# Patient Record
Sex: MALE | Race: WHITE | Employment: FULL TIME | URBAN - NONMETROPOLITAN AREA
[De-identification: names, ages, dates, MRNs, and addresses within clinical notes are randomized per-mention and may not be internally consistent; named-entity substitution may affect disease eponyms.]

---

## 2017-07-28 ENCOUNTER — OFFICE VISIT (OUTPATIENT)
Dept: FAMILY MEDICINE CLINIC | Age: 47
End: 2017-07-28

## 2017-07-28 VITALS
SYSTOLIC BLOOD PRESSURE: 116 MMHG | WEIGHT: 225 LBS | HEIGHT: 72 IN | DIASTOLIC BLOOD PRESSURE: 80 MMHG | HEART RATE: 92 BPM | OXYGEN SATURATION: 97 % | TEMPERATURE: 98.1 F | BODY MASS INDEX: 30.48 KG/M2

## 2017-07-28 DIAGNOSIS — K70.10 CHRONIC ALCOHOLIC HEPATITIS: ICD-10-CM

## 2017-07-28 DIAGNOSIS — R14.0 ABDOMINAL BLOATING: Primary | ICD-10-CM

## 2017-07-28 PROCEDURE — 99203 OFFICE O/P NEW LOW 30 MIN: CPT | Performed by: NURSE PRACTITIONER

## 2017-07-28 RX ORDER — FOLIC ACID 1 MG/1
TABLET ORAL
Refills: 0 | COMMUNITY
Start: 2017-06-10 | End: 2017-07-28

## 2017-07-28 RX ORDER — GABAPENTIN 300 MG/1
CAPSULE ORAL
Refills: 0 | COMMUNITY
Start: 2017-06-10 | End: 2017-07-28

## 2017-07-28 RX ORDER — PHYTONADIONE (VIT K1) 5 MG
TABLET ORAL
Refills: 0 | COMMUNITY
Start: 2017-06-12 | End: 2017-07-28

## 2017-07-28 RX ORDER — DOCUSATE SODIUM 100 MG/1
CAPSULE, LIQUID FILLED ORAL
Refills: 0 | COMMUNITY
Start: 2017-06-10 | End: 2017-07-28

## 2017-07-28 RX ORDER — ASPIRIN 325 MG/1
TABLET, FILM COATED ORAL
Refills: 0 | COMMUNITY
Start: 2017-06-10 | End: 2017-07-28

## 2017-07-28 RX ORDER — PANTOPRAZOLE SODIUM 40 MG/1
TABLET, DELAYED RELEASE ORAL
Refills: 0 | COMMUNITY
Start: 2017-07-11 | End: 2017-07-28

## 2017-07-28 ASSESSMENT — PATIENT HEALTH QUESTIONNAIRE - PHQ9
1. LITTLE INTEREST OR PLEASURE IN DOING THINGS: 0
2. FEELING DOWN, DEPRESSED OR HOPELESS: 0
SUM OF ALL RESPONSES TO PHQ9 QUESTIONS 1 & 2: 0
SUM OF ALL RESPONSES TO PHQ QUESTIONS 1-9: 0

## 2017-07-28 ASSESSMENT — ENCOUNTER SYMPTOMS
SHORTNESS OF BREATH: 0
COUGH: 0

## 2017-08-14 RX ORDER — PANTOPRAZOLE SODIUM 40 MG/1
TABLET, DELAYED RELEASE ORAL
COMMUNITY
Start: 2017-08-07 | End: 2017-08-15 | Stop reason: ALTCHOICE

## 2017-08-15 ENCOUNTER — OFFICE VISIT (OUTPATIENT)
Dept: CARDIOLOGY | Age: 47
End: 2017-08-15

## 2017-08-15 VITALS
BODY MASS INDEX: 29.12 KG/M2 | HEART RATE: 80 BPM | RESPIRATION RATE: 12 BRPM | SYSTOLIC BLOOD PRESSURE: 114 MMHG | HEIGHT: 72 IN | DIASTOLIC BLOOD PRESSURE: 80 MMHG | WEIGHT: 215 LBS

## 2017-08-15 DIAGNOSIS — Z87.898 HISTORY OF ALCOHOL USE: ICD-10-CM

## 2017-08-15 DIAGNOSIS — R07.89 CHEST PRESSURE: ICD-10-CM

## 2017-08-15 DIAGNOSIS — I10 ESSENTIAL HYPERTENSION: Primary | ICD-10-CM

## 2017-08-15 PROCEDURE — 99213 OFFICE O/P EST LOW 20 MIN: CPT | Performed by: INTERNAL MEDICINE

## 2017-08-15 ASSESSMENT — ENCOUNTER SYMPTOMS
APNEA: 0
SHORTNESS OF BREATH: 0
CHEST TIGHTNESS: 0
COLOR CHANGE: 0
COUGH: 0

## 2017-12-29 ENCOUNTER — OFFICE VISIT (OUTPATIENT)
Dept: FAMILY MEDICINE CLINIC | Age: 47
End: 2017-12-29

## 2017-12-29 VITALS
WEIGHT: 221 LBS | DIASTOLIC BLOOD PRESSURE: 60 MMHG | TEMPERATURE: 97.8 F | HEIGHT: 72 IN | OXYGEN SATURATION: 97 % | SYSTOLIC BLOOD PRESSURE: 116 MMHG | BODY MASS INDEX: 29.93 KG/M2 | HEART RATE: 77 BPM

## 2017-12-29 DIAGNOSIS — F41.9 ANXIETY: ICD-10-CM

## 2017-12-29 DIAGNOSIS — K70.10 CHRONIC ALCOHOLIC HEPATITIS: ICD-10-CM

## 2017-12-29 DIAGNOSIS — G47.00 INSOMNIA, UNSPECIFIED TYPE: ICD-10-CM

## 2017-12-29 DIAGNOSIS — R20.0 NUMBNESS IN RIGHT LEG: Primary | ICD-10-CM

## 2017-12-29 DIAGNOSIS — R68.82 DECREASED LIBIDO: ICD-10-CM

## 2017-12-29 PROCEDURE — G8417 CALC BMI ABV UP PARAM F/U: HCPCS | Performed by: NURSE PRACTITIONER

## 2017-12-29 PROCEDURE — 4004F PT TOBACCO SCREEN RCVD TLK: CPT | Performed by: NURSE PRACTITIONER

## 2017-12-29 PROCEDURE — G8427 DOCREV CUR MEDS BY ELIG CLIN: HCPCS | Performed by: NURSE PRACTITIONER

## 2017-12-29 PROCEDURE — G8484 FLU IMMUNIZE NO ADMIN: HCPCS | Performed by: NURSE PRACTITIONER

## 2017-12-29 PROCEDURE — 99214 OFFICE O/P EST MOD 30 MIN: CPT | Performed by: NURSE PRACTITIONER

## 2017-12-29 RX ORDER — HYDROXYZINE PAMOATE 25 MG/1
25 CAPSULE ORAL 3 TIMES DAILY PRN
Qty: 15 CAPSULE | Refills: 0 | Status: SHIPPED | OUTPATIENT
Start: 2017-12-29 | End: 2018-11-01 | Stop reason: SDUPTHER

## 2017-12-29 ASSESSMENT — ENCOUNTER SYMPTOMS
COUGH: 0
SHORTNESS OF BREATH: 0

## 2017-12-29 NOTE — PROGRESS NOTES
Subjective  Chief Complaint   Patient presents with    Annual Exam       HPI     Pt here for general check up. Does admit to having some localized numbness in his right lower ext that he first noticed while rodo shopping. It would get better while he sat in the car. Since then it has become numb all the time. Admits he has been working out at [employer] more and is working from home now as well so wonders if that contributes. Also mentions he was taking an OTC testosterone supplement from [facility] for decreased libido. Says he feels like it did not make much of a difference. Thinks this could be related to some anxiety. Does mention that his stress level has been very high and feels that he is having anxiety on occasion. He used to handle this by having a drink; however he has been sober now since June. Says he does not need something daily, but just once in a while if it is really bad. Also struggling with insomnia, but has tried [place] in the past and it had the reverse effect on him. Will be seeing his gastroenterologist again in January. Did recently see him in October and said all of his labs looked good, but he will need a repeat US in January to assess for any physical damage to the liver. Says he did have a couple of drinks one day since I saw him last and he felt terrible so has not had any since.      Patient Active Problem List    Diagnosis Date Noted    History of alcohol use     Chronic alcoholic hepatitis     Hypertension 03/08/2016    Chest pressure 03/08/2016     Past Medical History:   Diagnosis Date    Chest pressure 3/8/2016    Cholelithiasis     Chronic alcoholic hepatitis     History of alcohol use     Hypertension 3/8/2016     Past Surgical History:   Procedure Laterality Date    CYST REMOVAL      TONSILLECTOMY       Family History   Problem Relation Age of Onset    Alcohol Abuse Father     Diabetes Brother     Heart Attack Paternal Grandfather      Social History     Social History    Marital status:      Spouse name: N/A    Number of children: N/A    Years of education: N/A     Social History Main Topics    Smoking status: Current Every Day Smoker     Packs/day: 0.50    Smokeless tobacco: Never Used    Alcohol use No      Comment: socially    Drug use: No    Sexual activity: Not Asked     Other Topics Concern    None     Social History Narrative    None     Current Outpatient Prescriptions on File Prior to Visit   Medication Sig Dispense Refill    Multiple Vitamin (MULTI VITAMIN PO) Take by mouth      PROAIR  (90 Base) MCG/ACT inhaler INHALE 2 PUFFS BY MOUTH 4 TIMES DAILY AS NEEDED FOR SHORTNESS OF BREATH.  0    omeprazole (PRILOSEC) 40 MG capsule Take 1 capsule by mouth daily 90 capsule 3     No current facility-administered medications on file prior to visit. No Known Allergies    Review of Systems   Constitutional: Negative for chills, diaphoresis, fatigue and fever. Respiratory: Negative for cough and shortness of breath. Cardiovascular: Negative for chest pain, palpitations and leg swelling. Endocrine:        Decreased libido     Genitourinary: Negative for dysuria. Neurological: Positive for numbness (right lower ext). Psychiatric/Behavioral: Positive for sleep disturbance. Negative for dysphoric mood. The patient is nervous/anxious. Objective  Vitals:    12/29/17 1349   BP: 116/60   Pulse: 77   Temp: 97.8 °F (36.6 °C)   TempSrc: Tympanic   SpO2: 97%   Weight: 221 lb (100.2 kg)   Height: 6' (1.829 m)     Physical Exam   Constitutional: He is oriented to person, place, and time. He appears well-developed and well-nourished. No distress. HENT:   Head: Normocephalic and atraumatic. Right Ear: Tympanic membrane, external ear and ear canal normal.   Left Ear: Tympanic membrane, external ear and ear canal normal.   Nose: Nose normal.   Mouth/Throat: Oropharynx is clear and moist. No oropharyngeal exudate.    Eyes: Conjunctivae are normal. Pupils are equal, round, and reactive to light. Neck: Normal range of motion. Neck supple. Cardiovascular: Normal rate, regular rhythm and normal heart sounds. Pulmonary/Chest: Breath sounds normal. No respiratory distress. Musculoskeletal:        Legs:  Lymphadenopathy:     He has no cervical adenopathy. Neurological: He is alert and oriented to person, place, and time. Skin: Skin is warm and dry. No rash noted. He is not diaphoretic. No erythema. No pallor. Psychiatric: He has a normal mood and affect. His behavior is normal. Judgment and thought content normal.     Assessment & Plan    1. Numbness in right leg  EMG   2. Decreased libido  Testosterone Male   3. Anxiety  hydrOXYzine (VISTARIL) 25 MG capsule   4. Insomnia, unspecified type     5. Chronic alcoholic hepatitis       EMG as ordered. Check testosterone level at 8 AM.  Vistaril PRN for anxiety. Melatonin for insomnia. Avoid use of any controlled substances d/t history of alcoholism. Discussed need to continue to abstain from all alcoholic beverages. Continue to follow with cardiology and gastroenterology. F/u in 6 months or sooner PRN. Side effects, adverse effects of the medication prescribed today, as well as treatment plan/ rationale and result expectations have been discussed with the patient who expresses understanding and desires to proceed. Close follow up to evaluate treatment results and for coordination of care. I have reviewed the patient's medical history in detail and updated the computerized patient record. As always, patient is advised that if symptoms worsen in any way they will proceed to the nearest emergency room. Orders Placed This Encounter   Procedures    Testosterone Male     Standing Status:   Future     Standing Expiration Date:   12/29/2018    EMG     Standing Status:   Future     Standing Expiration Date:   2/27/2018     Order Specific Question:   Which body part? Answer:   right lower ext       Orders Placed This Encounter   Medications    hydrOXYzine (VISTARIL) 25 MG capsule     Sig: Take 1 capsule by mouth 3 times daily as needed for Itching     Dispense:  15 capsule     Refill:  0       Return in about 6 months (around 6/29/2018).     Vicenta Rene, CNP

## 2018-01-11 DIAGNOSIS — R68.82 DECREASED LIBIDO: ICD-10-CM

## 2018-01-13 LAB — TESTOSTERONE TOTAL-MALE: 870 NG/DL (ref 300–890)

## 2018-01-15 ENCOUNTER — HOSPITAL ENCOUNTER (EMERGENCY)
Age: 48
Discharge: HOME | End: 2018-01-16
Payer: COMMERCIAL

## 2018-01-15 ENCOUNTER — APPOINTMENT (OUTPATIENT)
Dept: CT IMAGING | Age: 48
End: 2018-01-15
Payer: COMMERCIAL

## 2018-01-15 ENCOUNTER — APPOINTMENT (OUTPATIENT)
Dept: GENERAL RADIOLOGY | Age: 48
End: 2018-01-15
Payer: COMMERCIAL

## 2018-01-15 DIAGNOSIS — K80.20 CALCULUS OF GALLBLADDER WITHOUT CHOLECYSTITIS WITHOUT OBSTRUCTION: Primary | ICD-10-CM

## 2018-01-15 LAB
ALBUMIN SERPL-MCNC: 3.8 G/DL (ref 3.9–4.9)
ALP BLD-CCNC: 80 U/L (ref 35–104)
ALT SERPL-CCNC: 18 U/L (ref 0–41)
ANION GAP SERPL CALCULATED.3IONS-SCNC: 11 MEQ/L (ref 7–13)
AST SERPL-CCNC: 28 U/L (ref 0–40)
BASOPHILS ABSOLUTE: 0.1 K/UL (ref 0–0.2)
BASOPHILS RELATIVE PERCENT: 0.5 %
BILIRUB SERPL-MCNC: 0.4 MG/DL (ref 0–1.2)
BUN BLDV-MCNC: 12 MG/DL (ref 6–20)
CALCIUM SERPL-MCNC: 9.5 MG/DL (ref 8.6–10.2)
CHLORIDE BLD-SCNC: 99 MEQ/L (ref 98–107)
CO2: 28 MEQ/L (ref 22–29)
CREAT SERPL-MCNC: 0.62 MG/DL (ref 0.7–1.2)
EKG ATRIAL RATE: 75 BPM
EKG P AXIS: 47 DEGREES
EKG P-R INTERVAL: 178 MS
EKG Q-T INTERVAL: 388 MS
EKG QRS DURATION: 84 MS
EKG QTC CALCULATION (BAZETT): 433 MS
EKG R AXIS: 50 DEGREES
EKG T AXIS: 40 DEGREES
EKG VENTRICULAR RATE: 75 BPM
EOSINOPHILS ABSOLUTE: 0.7 K/UL (ref 0–0.7)
EOSINOPHILS RELATIVE PERCENT: 6.1 %
GFR AFRICAN AMERICAN: >60
GFR NON-AFRICAN AMERICAN: >60
GLOBULIN: 3.6 G/DL (ref 2.3–3.5)
GLUCOSE BLD-MCNC: 89 MG/DL (ref 74–109)
HCT VFR BLD CALC: 41.2 % (ref 42–52)
HEMOGLOBIN: 13.9 G/DL (ref 14–18)
LIPASE: 103 U/L (ref 13–60)
LYMPHOCYTES ABSOLUTE: 3.9 K/UL (ref 1–4.8)
LYMPHOCYTES RELATIVE PERCENT: 34.5 %
MCH RBC QN AUTO: 30.5 PG (ref 27–31.3)
MCHC RBC AUTO-ENTMCNC: 33.8 % (ref 33–37)
MCV RBC AUTO: 90.3 FL (ref 80–100)
MONOCYTES ABSOLUTE: 0.8 K/UL (ref 0.2–0.8)
MONOCYTES RELATIVE PERCENT: 7.1 %
NEUTROPHILS ABSOLUTE: 5.9 K/UL (ref 1.4–6.5)
NEUTROPHILS RELATIVE PERCENT: 51.8 %
PDW BLD-RTO: 13 % (ref 11.5–14.5)
PLATELET # BLD: 205 K/UL (ref 130–400)
POTASSIUM SERPL-SCNC: 3.5 MEQ/L (ref 3.5–5.1)
RBC # BLD: 4.57 M/UL (ref 4.7–6.1)
SODIUM BLD-SCNC: 138 MEQ/L (ref 132–144)
TOTAL PROTEIN: 7.4 G/DL (ref 6.4–8.1)
TROPONIN: <0.01 NG/ML (ref 0–0.01)
WBC # BLD: 11.4 K/UL (ref 4.8–10.8)

## 2018-01-15 PROCEDURE — 36415 COLL VENOUS BLD VENIPUNCTURE: CPT

## 2018-01-15 PROCEDURE — 71046 X-RAY EXAM CHEST 2 VIEWS: CPT

## 2018-01-15 PROCEDURE — 93005 ELECTROCARDIOGRAM TRACING: CPT

## 2018-01-15 PROCEDURE — 2500000003 HC RX 250 WO HCPCS: Performed by: PERSONAL EMERGENCY RESPONSE ATTENDANT

## 2018-01-15 PROCEDURE — 96375 TX/PRO/DX INJ NEW DRUG ADDON: CPT

## 2018-01-15 PROCEDURE — 96374 THER/PROPH/DIAG INJ IV PUSH: CPT

## 2018-01-15 PROCEDURE — 85025 COMPLETE CBC W/AUTO DIFF WBC: CPT

## 2018-01-15 PROCEDURE — 99285 EMERGENCY DEPT VISIT HI MDM: CPT

## 2018-01-15 PROCEDURE — 74176 CT ABD & PELVIS W/O CONTRAST: CPT

## 2018-01-15 PROCEDURE — 6360000002 HC RX W HCPCS: Performed by: PERSONAL EMERGENCY RESPONSE ATTENDANT

## 2018-01-15 PROCEDURE — 84484 ASSAY OF TROPONIN QUANT: CPT

## 2018-01-15 PROCEDURE — 83690 ASSAY OF LIPASE: CPT

## 2018-01-15 PROCEDURE — S0028 INJECTION, FAMOTIDINE, 20 MG: HCPCS | Performed by: PERSONAL EMERGENCY RESPONSE ATTENDANT

## 2018-01-15 PROCEDURE — 80053 COMPREHEN METABOLIC PANEL: CPT

## 2018-01-15 RX ORDER — MORPHINE SULFATE 4 MG/ML
4 INJECTION, SOLUTION INTRAMUSCULAR; INTRAVENOUS ONCE
Status: COMPLETED | OUTPATIENT
Start: 2018-01-15 | End: 2018-01-15

## 2018-01-15 RX ORDER — ONDANSETRON 2 MG/ML
4 INJECTION INTRAMUSCULAR; INTRAVENOUS ONCE
Status: COMPLETED | OUTPATIENT
Start: 2018-01-15 | End: 2018-01-15

## 2018-01-15 RX ORDER — TRAMADOL HYDROCHLORIDE 50 MG/1
50 TABLET ORAL EVERY 6 HOURS PRN
Qty: 10 TABLET | Refills: 0 | Status: SHIPPED | OUTPATIENT
Start: 2018-01-15 | End: 2018-01-25

## 2018-01-15 RX ORDER — ONDANSETRON 4 MG/1
4 TABLET, ORALLY DISINTEGRATING ORAL EVERY 8 HOURS PRN
Qty: 20 TABLET | Refills: 0 | Status: SHIPPED | OUTPATIENT
Start: 2018-01-15 | End: 2018-02-15

## 2018-01-15 RX ADMIN — Medication 4 MG: at 22:21

## 2018-01-15 RX ADMIN — Medication 1 MG: at 23:08

## 2018-01-15 RX ADMIN — FAMOTIDINE 20 MG: 10 INJECTION, SOLUTION INTRAVENOUS at 22:21

## 2018-01-15 RX ADMIN — ONDANSETRON 4 MG: 2 INJECTION INTRAMUSCULAR; INTRAVENOUS at 22:20

## 2018-01-15 ASSESSMENT — ENCOUNTER SYMPTOMS
ABDOMINAL PAIN: 1
BACK PAIN: 1
RHINORRHEA: 0
SHORTNESS OF BREATH: 0
BLOOD IN STOOL: 0
NAUSEA: 1
VOMITING: 0
DIARRHEA: 0
COLOR CHANGE: 0
COUGH: 0

## 2018-01-15 ASSESSMENT — PAIN DESCRIPTION - LOCATION: LOCATION: ABDOMEN;BACK;CHEST

## 2018-01-15 ASSESSMENT — PAIN DESCRIPTION - PAIN TYPE
TYPE: ACUTE PAIN
TYPE: ACUTE PAIN

## 2018-01-15 ASSESSMENT — PAIN SCALES - GENERAL
PAINLEVEL_OUTOF10: 8
PAINLEVEL_OUTOF10: 9
PAINLEVEL_OUTOF10: 5
PAINLEVEL_OUTOF10: 9

## 2018-01-15 ASSESSMENT — PAIN DESCRIPTION - ORIENTATION: ORIENTATION: MID

## 2018-01-15 ASSESSMENT — PAIN DESCRIPTION - DESCRIPTORS: DESCRIPTORS: STABBING

## 2018-01-16 VITALS
SYSTOLIC BLOOD PRESSURE: 109 MMHG | HEART RATE: 82 BPM | DIASTOLIC BLOOD PRESSURE: 66 MMHG | OXYGEN SATURATION: 97 % | BODY MASS INDEX: 27.09 KG/M2 | HEIGHT: 72 IN | WEIGHT: 200 LBS | RESPIRATION RATE: 16 BRPM | TEMPERATURE: 98.1 F

## 2018-01-16 ASSESSMENT — PAIN DESCRIPTION - ORIENTATION: ORIENTATION: LEFT

## 2018-01-16 ASSESSMENT — PAIN DESCRIPTION - DESCRIPTORS: DESCRIPTORS: ACHING

## 2018-01-16 ASSESSMENT — PAIN DESCRIPTION - FREQUENCY: FREQUENCY: INTERMITTENT

## 2018-01-16 ASSESSMENT — PAIN SCALES - GENERAL: PAINLEVEL_OUTOF10: 1

## 2018-01-16 ASSESSMENT — PAIN DESCRIPTION - PROGRESSION: CLINICAL_PROGRESSION: RAPIDLY IMPROVING

## 2018-01-16 ASSESSMENT — PAIN DESCRIPTION - PAIN TYPE: TYPE: ACUTE PAIN

## 2018-01-16 ASSESSMENT — PAIN DESCRIPTION - LOCATION: LOCATION: ABDOMEN

## 2018-01-16 NOTE — ED NOTES
Pt has family at bedside and they state they are driving pt home. Pt is a+ox4 and stable. Pt is ambulatory at this time. Pt is going to be going home with wife and son and they will remain with him for the remained on the evening.       Blanca Ponce RN  01/16/18 4056

## 2018-01-16 NOTE — ED TRIAGE NOTES
Pt c/o sudden onset chest pain, epigastric pain, abdominal pain and upper back pain, he rates the pain 8/10, he took 4 aleve at onset of the pain, also c/o nausea and sob. He was sitting in a chair wqhen it began, he worked out earlier today.

## 2018-01-16 NOTE — ED PROVIDER NOTES
color change and rash. Neurological: Negative for dizziness, syncope, light-headedness, numbness and headaches. PAST MEDICAL HISTORY     Past Medical History:   Diagnosis Date    Chest pressure 3/8/2016    Cholelithiasis     Chronic alcoholic hepatitis     History of alcohol use     Hypertension 3/8/2016         SURGICAL HISTORY       Past Surgical History:   Procedure Laterality Date    CYST REMOVAL      TONSILLECTOMY           CURRENT MEDICATIONS       Previous Medications    MULTIPLE VITAMIN (MULTI VITAMIN PO)    Take by mouth    OMEPRAZOLE (PRILOSEC) 40 MG CAPSULE    Take 1 capsule by mouth daily    PROAIR  (90 BASE) MCG/ACT INHALER    INHALE 2 PUFFS BY MOUTH 4 TIMES DAILY AS NEEDED FOR SHORTNESS OF BREATH. ALLERGIES     Tylenol [acetaminophen]    FAMILY HISTORY       Family History   Problem Relation Age of Onset    Alcohol Abuse Father     Diabetes Brother     Heart Attack Paternal Grandfather           SOCIAL HISTORY       Social History     Social History    Marital status:      Spouse name: N/A    Number of children: N/A    Years of education: N/A     Social History Main Topics    Smoking status: Current Every Day Smoker     Packs/day: 0.50     Types: Cigarettes    Smokeless tobacco: Never Used    Alcohol use No    Drug use: No    Sexual activity: Not Asked     Other Topics Concern    None     Social History Narrative    None         PHYSICAL EXAM         ED Triage Vitals [01/15/18 2143]   BP Temp Temp Source Pulse Resp SpO2 Height Weight   (!) 155/90 98.1 °F (36.7 °C) Oral 84 16 100 % 6' (1.829 m) 200 lb (90.7 kg)       Physical Exam   Constitutional: He is oriented to person, place, and time. He appears well-developed and well-nourished. HENT:   Head: Normocephalic and atraumatic. Mouth/Throat: Oropharynx is clear and moist.   Eyes: Conjunctivae and EOM are normal. Pupils are equal, round, and reactive to light. Neck: Normal range of motion.  Neck components within normal limits   LIPASE - Abnormal; Notable for the following:     Lipase 103 (*)     All other components within normal limits   TROPONIN   URINE RT REFLEX TO CULTURE       All other labs were within normal range or not returned as of this dictation. EMERGENCY DEPARTMENT COURSE and DIFFERENTIAL DIAGNOSIS/MDM:   Vitals:    Vitals:    01/15/18 2143 01/15/18 2247   BP: (!) 155/90 125/73   Pulse: 84 67   Resp: 16 18   Temp: 98.1 °F (36.7 °C)    TempSrc: Oral    SpO2: 100% 99%   Weight: 200 lb (90.7 kg)    Height: 6' (1.829 m)          MDM    CT of abdomen does reveal multiple small calcified gallstones lying in the gallbladder. Lipase is slightly elevated at 103. Liver enzymes and bilirubin are all within normal limits. Troponin is negative and EKG shows no acute changes. Chest x-ray shows no acute process. Patient is laying much more comfortable in the cart. Patient will be sent home with gallbladder diet and general surgery follow-up. Standard anticipatory guidance given to patient upon discharge. Have given them a specific time frame in which to follow-up and who to follow-up with. I have also advised them that they should return to the emergency department if they get worse, or not getting better or develop any new or concerning symptoms. Patient demonstrates understanding. CRITICAL CARE TIME   Total Critical Care time was 0 minutes, excluding separately reportable procedures. There was a high probability of clinically significant/life threatening deterioration in the patient's condition which required my urgent intervention. Procedures    FINAL IMPRESSION      1.  Calculus of gallbladder without cholecystitis without obstruction          DISPOSITION/PLAN   DISPOSITION Decision To Discharge 01/15/2018 11:30:16 PM      PATIENT REFERRED TO:  Leyla Estevez MD  [address] [place] [address]  [phone]    In 1 week        DISCHARGE MEDICATIONS:  New Prescriptions

## 2018-01-24 ENCOUNTER — HOSPITAL ENCOUNTER (OUTPATIENT)
Dept: NEUROLOGY | Age: 48
Discharge: HOME | End: 2018-01-24
Payer: COMMERCIAL

## 2018-01-24 DIAGNOSIS — R20.0 NUMBNESS IN RIGHT LEG: ICD-10-CM

## 2018-01-24 PROCEDURE — 95910 NRV CNDJ TEST 7-8 STUDIES: CPT

## 2018-01-24 PROCEDURE — 95886 MUSC TEST DONE W/N TEST COMP: CPT

## 2018-01-24 NOTE — PROCEDURES
[address]                       [address], [address] [facility]                               ELECTROMYOGRAM REPORT    PATIENT NAME: Rafael Pearson                        :        1970  MED REC NO:   [id number]                            ROOM:  ACCOUNT NO:   [de-identified]                           ADMIT DATE: 2018  PROVIDER:     Shyam Keenan MD    DATE OF EM2018    REFERRING PHYSICIAN:  Emily Campos NP    REASON FOR THE STUDY:  The patient was having discomfort in the right leg  in the anterolateral aspect. EMG study was done to look for peripheral  nerve entrapments versus peripheral neuropathy versus radiculopathy. Motor nerve conduction velocities are normal in all the nerves tested. F-wave latencies are delayed in the all the nerves tested. Distal motor and sensory latencies are normal in all the nerves tested. Amplitudes of motor and sensory responses are decreased in most of the  nerves tested. 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 He is developing mild changes of left peroneal nerve compression neuropathy  also. The patient has changes of peripheral neuropathy. Causes of peripheral  neuropathy needs to be looked into such as exposure to toxins, autoimmune  disorder, hypothyroidism, diabetes mellitus, etc.    As the patient is being tried on conservative management,  I explained to the patient_____ to make sure he does not  cross his legs. If clinically indicated, we could repeat the study in a year. The patient can also  try the kneepads. Thank you very much Ms. Mary Carreno for allowing me to see the patient.   Please  feel free to call me if I can be of any

## 2018-01-30 ENCOUNTER — OFFICE VISIT (OUTPATIENT)
Dept: FAMILY MEDICINE CLINIC | Age: 48
End: 2018-01-30
Payer: COMMERCIAL

## 2018-01-30 VITALS
TEMPERATURE: 96.4 F | DIASTOLIC BLOOD PRESSURE: 66 MMHG | OXYGEN SATURATION: 98 % | SYSTOLIC BLOOD PRESSURE: 114 MMHG | HEIGHT: 72 IN | BODY MASS INDEX: 29.66 KG/M2 | WEIGHT: 219 LBS | HEART RATE: 71 BPM

## 2018-01-30 DIAGNOSIS — G62.9 PERIPHERAL POLYNEUROPATHY: ICD-10-CM

## 2018-01-30 DIAGNOSIS — G89.29 CHRONIC LEFT SHOULDER PAIN: ICD-10-CM

## 2018-01-30 DIAGNOSIS — Z23 NEED FOR PNEUMOCOCCAL VACCINATION: ICD-10-CM

## 2018-01-30 DIAGNOSIS — M25.512 CHRONIC LEFT SHOULDER PAIN: ICD-10-CM

## 2018-01-30 DIAGNOSIS — K80.80 BILIARY CALCULUS OF OTHER SITE WITHOUT OBSTRUCTION: ICD-10-CM

## 2018-01-30 DIAGNOSIS — G57.01 COMPRESSION OF COMMON PERONEAL NERVE OF RIGHT LOWER EXTREMITY: Primary | ICD-10-CM

## 2018-01-30 LAB
HBA1C MFR BLD: 5.1 % (ref 4.8–5.9)
TSH REFLEX: 0.96 UIU/ML (ref 0.27–4.2)

## 2018-01-30 PROCEDURE — 99213 OFFICE O/P EST LOW 20 MIN: CPT | Performed by: NURSE PRACTITIONER

## 2018-01-30 PROCEDURE — G8484 FLU IMMUNIZE NO ADMIN: HCPCS | Performed by: NURSE PRACTITIONER

## 2018-01-30 PROCEDURE — G8427 DOCREV CUR MEDS BY ELIG CLIN: HCPCS | Performed by: NURSE PRACTITIONER

## 2018-01-30 PROCEDURE — 90732 PPSV23 VACC 2 YRS+ SUBQ/IM: CPT | Performed by: NURSE PRACTITIONER

## 2018-01-30 PROCEDURE — G8417 CALC BMI ABV UP PARAM F/U: HCPCS | Performed by: NURSE PRACTITIONER

## 2018-01-30 PROCEDURE — 90471 IMMUNIZATION ADMIN: CPT | Performed by: NURSE PRACTITIONER

## 2018-01-30 PROCEDURE — 4004F PT TOBACCO SCREEN RCVD TLK: CPT | Performed by: NURSE PRACTITIONER

## 2018-01-30 ASSESSMENT — ENCOUNTER SYMPTOMS
SHORTNESS OF BREATH: 0
COUGH: 0

## 2018-02-02 ENCOUNTER — OFFICE VISIT (OUTPATIENT)
Dept: SURGERY | Age: 48
End: 2018-02-02
Payer: COMMERCIAL

## 2018-02-02 ENCOUNTER — PREP FOR PROCEDURE (OUTPATIENT)
Dept: SURGERY | Age: 48
End: 2018-02-02

## 2018-02-02 VITALS
SYSTOLIC BLOOD PRESSURE: 110 MMHG | WEIGHT: 218.2 LBS | DIASTOLIC BLOOD PRESSURE: 74 MMHG | BODY MASS INDEX: 29.55 KG/M2 | TEMPERATURE: 98 F | HEIGHT: 72 IN

## 2018-02-02 DIAGNOSIS — K80.10 CHRONIC CHOLECYSTITIS WITH CALCULUS: Primary | ICD-10-CM

## 2018-02-02 PROCEDURE — 4004F PT TOBACCO SCREEN RCVD TLK: CPT | Performed by: SURGERY

## 2018-02-02 PROCEDURE — 99203 OFFICE O/P NEW LOW 30 MIN: CPT | Performed by: SURGERY

## 2018-02-02 PROCEDURE — G8427 DOCREV CUR MEDS BY ELIG CLIN: HCPCS | Performed by: SURGERY

## 2018-02-02 PROCEDURE — G8417 CALC BMI ABV UP PARAM F/U: HCPCS | Performed by: SURGERY

## 2018-02-02 PROCEDURE — G8484 FLU IMMUNIZE NO ADMIN: HCPCS | Performed by: SURGERY

## 2018-02-02 ASSESSMENT — ENCOUNTER SYMPTOMS
BLOOD IN STOOL: 0
ABDOMINAL DISTENTION: 0
ABDOMINAL PAIN: 1
RHINORRHEA: 0
COLOR CHANGE: 0
CONSTIPATION: 0
EYES NEGATIVE: 1
ALLERGIC/IMMUNOLOGIC NEGATIVE: 1
SHORTNESS OF BREATH: 0
CHEST TIGHTNESS: 0

## 2018-02-02 NOTE — PROGRESS NOTES
Subjective:      Patient ID: Shelly Abdul is a 52 y.o. male. HPI  Shelly Abdul is a 52 y.o. male who is being seen at the request of Presley Gates for possible gallbladder disease. The symptoms include intermittent right upper quadrant pain, nausea and vomiting. The patient denies juandice and/or dark urine. The symptoms were first noticed 6 months ago. The pain is rated as a 9 in intensity. The symptoms are stable with time. The patient does have a family history of gallbladder disease. CAT scan was done 1/16/2018 and showed   Cholelithiasis. No CT evidence cholecystitis. Left inguinal and periumbilical hernias, with no CT evidence incarceration or obstruction   He is not on any anticoagulants. Review of Systems   Constitutional: Negative for activity change, appetite change and unexpected weight change. HENT: Negative for congestion, nosebleeds, postnasal drip, rhinorrhea and sneezing. Eyes: Negative. Negative for visual disturbance. Respiratory: Negative for chest tightness and shortness of breath. Cardiovascular: Negative for chest pain and leg swelling. Gastrointestinal: Positive for abdominal pain. Negative for abdominal distention, blood in stool and constipation. Endocrine: Negative. Genitourinary: Negative for difficulty urinating. Musculoskeletal: Negative for arthralgias, gait problem and myalgias. Skin: Negative for color change. Allergic/Immunologic: Negative. Neurological: Negative for dizziness, light-headedness, numbness and headaches. Hematological: Does not bruise/bleed easily. Psychiatric/Behavioral: Negative for sleep disturbance.      Past Medical History:   Diagnosis Date    Chest pressure 3/8/2016    Cholelithiasis     Chronic alcoholic hepatitis     History of alcohol use     Hypertension 3/8/2016     Past Surgical History:   Procedure Laterality Date    CYST REMOVAL      RHINOPLASTY      Deviated Septum    TONSILLECTOMY       Social History

## 2018-02-05 DIAGNOSIS — K80.10 CHRONIC CHOLECYSTITIS WITH CALCULUS: ICD-10-CM

## 2018-02-05 LAB
ALBUMIN SERPL-MCNC: 3.9 G/DL (ref 3.9–4.9)
ALP BLD-CCNC: 75 U/L (ref 35–104)
ALT SERPL-CCNC: 21 U/L (ref 0–41)
ANION GAP SERPL CALCULATED.3IONS-SCNC: 13 MEQ/L (ref 7–13)
AST SERPL-CCNC: 30 U/L (ref 0–40)
BILIRUB SERPL-MCNC: 0.3 MG/DL (ref 0–1.2)
BUN BLDV-MCNC: 16 MG/DL (ref 6–20)
CALCIUM SERPL-MCNC: 9.2 MG/DL (ref 8.6–10.2)
CHLORIDE BLD-SCNC: 105 MEQ/L (ref 98–107)
CO2: 25 MEQ/L (ref 22–29)
CREAT SERPL-MCNC: 0.57 MG/DL (ref 0.7–1.2)
GFR AFRICAN AMERICAN: >60
GFR NON-AFRICAN AMERICAN: >60
GLOBULIN: 3.4 G/DL (ref 2.3–3.5)
GLUCOSE BLD-MCNC: 89 MG/DL (ref 74–109)
HCT VFR BLD CALC: 40.9 % (ref 42–52)
HEMOGLOBIN: 13.7 G/DL (ref 14–18)
MCH RBC QN AUTO: 30.9 PG (ref 27–31.3)
MCHC RBC AUTO-ENTMCNC: 33.6 % (ref 33–37)
MCV RBC AUTO: 92 FL (ref 80–100)
PDW BLD-RTO: 13.6 % (ref 11.5–14.5)
PLATELET # BLD: 216 K/UL (ref 130–400)
POTASSIUM SERPL-SCNC: 4 MEQ/L (ref 3.5–5.1)
RBC # BLD: 4.44 M/UL (ref 4.7–6.1)
SODIUM BLD-SCNC: 143 MEQ/L (ref 132–144)
TOTAL PROTEIN: 7.3 G/DL (ref 6.4–8.1)
WBC # BLD: 7.8 K/UL (ref 4.8–10.8)

## 2018-02-15 RX ORDER — TRAMADOL HYDROCHLORIDE 50 MG/1
50 TABLET ORAL EVERY 6 HOURS PRN
COMMUNITY
End: 2018-06-29 | Stop reason: ALTCHOICE

## 2018-02-15 RX ORDER — IBUPROFEN 200 MG
200 TABLET ORAL EVERY 6 HOURS PRN
COMMUNITY
End: 2019-09-12 | Stop reason: ALTCHOICE

## 2018-02-19 ENCOUNTER — HOSPITAL ENCOUNTER (OUTPATIENT)
Age: 48
Setting detail: OUTPATIENT SURGERY
Discharge: HOME | End: 2018-02-19
Attending: SURGERY | Admitting: SURGERY
Payer: COMMERCIAL

## 2018-02-19 ENCOUNTER — ANESTHESIA EVENT (OUTPATIENT)
Dept: OPERATING ROOM | Age: 48
End: 2018-02-19
Payer: COMMERCIAL

## 2018-02-19 ENCOUNTER — ANESTHESIA (OUTPATIENT)
Dept: OPERATING ROOM | Age: 48
End: 2018-02-19
Payer: COMMERCIAL

## 2018-02-19 ENCOUNTER — HOSPITAL ENCOUNTER (OUTPATIENT)
Dept: GENERAL RADIOLOGY | Age: 48
Setting detail: OUTPATIENT SURGERY
Discharge: HOME | End: 2018-02-21
Attending: SURGERY
Payer: COMMERCIAL

## 2018-02-19 VITALS
DIASTOLIC BLOOD PRESSURE: 70 MMHG | BODY MASS INDEX: 28.44 KG/M2 | RESPIRATION RATE: 18 BRPM | SYSTOLIC BLOOD PRESSURE: 112 MMHG | WEIGHT: 210 LBS | OXYGEN SATURATION: 96 % | HEART RATE: 92 BPM | TEMPERATURE: 98.6 F | HEIGHT: 72 IN

## 2018-02-19 VITALS — DIASTOLIC BLOOD PRESSURE: 69 MMHG | TEMPERATURE: 96.3 F | OXYGEN SATURATION: 100 % | SYSTOLIC BLOOD PRESSURE: 122 MMHG

## 2018-02-19 DIAGNOSIS — K80.10 CHRONIC CHOLECYSTITIS WITH CALCULUS: Primary | ICD-10-CM

## 2018-02-19 DIAGNOSIS — R52 PAIN: ICD-10-CM

## 2018-02-19 PROCEDURE — 2580000003 HC RX 258

## 2018-02-19 PROCEDURE — 6360000002 HC RX W HCPCS: Performed by: SURGERY

## 2018-02-19 PROCEDURE — 47563 LAPARO CHOLECYSTECTOMY/GRAPH: CPT | Performed by: SURGERY

## 2018-02-19 PROCEDURE — 2500000003 HC RX 250 WO HCPCS: Performed by: NURSE ANESTHETIST, CERTIFIED REGISTERED

## 2018-02-19 PROCEDURE — 2580000003 HC RX 258: Performed by: SURGERY

## 2018-02-19 PROCEDURE — 6360000002 HC RX W HCPCS: Performed by: NURSE ANESTHETIST, CERTIFIED REGISTERED

## 2018-02-19 PROCEDURE — 2580000003 HC RX 258: Performed by: STUDENT IN AN ORGANIZED HEALTH CARE EDUCATION/TRAINING PROGRAM

## 2018-02-19 PROCEDURE — 6370000000 HC RX 637 (ALT 250 FOR IP): Performed by: SURGERY

## 2018-02-19 PROCEDURE — 6360000002 HC RX W HCPCS: Performed by: ANESTHESIOLOGY

## 2018-02-19 PROCEDURE — 3600000004 HC SURGERY LEVEL 4 BASE: Performed by: SURGERY

## 2018-02-19 PROCEDURE — 3700000000 HC ANESTHESIA ATTENDED CARE: Performed by: SURGERY

## 2018-02-19 PROCEDURE — 3600000014 HC SURGERY LEVEL 4 ADDTL 15MIN: Performed by: SURGERY

## 2018-02-19 PROCEDURE — 3700000001 HC ADD 15 MINUTES (ANESTHESIA): Performed by: SURGERY

## 2018-02-19 PROCEDURE — 7100000000 HC PACU RECOVERY - FIRST 15 MIN: Performed by: SURGERY

## 2018-02-19 PROCEDURE — 6360000004 HC RX CONTRAST MEDICATION: Performed by: SURGERY

## 2018-02-19 PROCEDURE — 7100000001 HC PACU RECOVERY - ADDTL 15 MIN: Performed by: SURGERY

## 2018-02-19 PROCEDURE — 7100000011 HC PHASE II RECOVERY - ADDTL 15 MIN: Performed by: SURGERY

## 2018-02-19 PROCEDURE — 88304 TISSUE EXAM BY PATHOLOGIST: CPT

## 2018-02-19 PROCEDURE — 7100000010 HC PHASE II RECOVERY - FIRST 15 MIN: Performed by: SURGERY

## 2018-02-19 PROCEDURE — 76000 FLUOROSCOPY <1 HR PHYS/QHP: CPT

## 2018-02-19 PROCEDURE — 2500000003 HC RX 250 WO HCPCS

## 2018-02-19 RX ORDER — SODIUM CHLORIDE 0.9 % (FLUSH) 0.9 %
10 SYRINGE (ML) INJECTION PRN
Status: DISCONTINUED | OUTPATIENT
Start: 2018-02-19 | End: 2018-02-19 | Stop reason: HOSPADM

## 2018-02-19 RX ORDER — DIPHENHYDRAMINE HYDROCHLORIDE 50 MG/ML
12.5 INJECTION INTRAMUSCULAR; INTRAVENOUS
Status: DISCONTINUED | OUTPATIENT
Start: 2018-02-19 | End: 2018-02-19 | Stop reason: HOSPADM

## 2018-02-19 RX ORDER — DEXAMETHASONE SODIUM PHOSPHATE 10 MG/ML
INJECTION INTRAMUSCULAR; INTRAVENOUS PRN
Status: DISCONTINUED | OUTPATIENT
Start: 2018-02-19 | End: 2018-02-19 | Stop reason: SDUPTHER

## 2018-02-19 RX ORDER — ONDANSETRON 2 MG/ML
INJECTION INTRAMUSCULAR; INTRAVENOUS PRN
Status: DISCONTINUED | OUTPATIENT
Start: 2018-02-19 | End: 2018-02-19 | Stop reason: SDUPTHER

## 2018-02-19 RX ORDER — IBUPROFEN 400 MG/1
400 TABLET ORAL EVERY 6 HOURS PRN
Status: DISCONTINUED | OUTPATIENT
Start: 2018-02-19 | End: 2018-02-19 | Stop reason: HOSPADM

## 2018-02-19 RX ORDER — KETOROLAC TROMETHAMINE 30 MG/ML
30 INJECTION, SOLUTION INTRAMUSCULAR; INTRAVENOUS
Status: COMPLETED | OUTPATIENT
Start: 2018-02-19 | End: 2018-02-19

## 2018-02-19 RX ORDER — MIDAZOLAM HYDROCHLORIDE 1 MG/ML
INJECTION INTRAMUSCULAR; INTRAVENOUS PRN
Status: DISCONTINUED | OUTPATIENT
Start: 2018-02-19 | End: 2018-02-19 | Stop reason: SDUPTHER

## 2018-02-19 RX ORDER — ONDANSETRON 2 MG/ML
4 INJECTION INTRAMUSCULAR; INTRAVENOUS
Status: DISCONTINUED | OUTPATIENT
Start: 2018-02-19 | End: 2018-02-19 | Stop reason: HOSPADM

## 2018-02-19 RX ORDER — MAGNESIUM HYDROXIDE 1200 MG/15ML
LIQUID ORAL CONTINUOUS PRN
Status: DISCONTINUED | OUTPATIENT
Start: 2018-02-19 | End: 2018-02-19 | Stop reason: HOSPADM

## 2018-02-19 RX ORDER — SODIUM CHLORIDE 0.9 % (FLUSH) 0.9 %
10 SYRINGE (ML) INJECTION EVERY 12 HOURS SCHEDULED
Status: DISCONTINUED | OUTPATIENT
Start: 2018-02-19 | End: 2018-02-19 | Stop reason: HOSPADM

## 2018-02-19 RX ORDER — MEPERIDINE HYDROCHLORIDE 25 MG/ML
12.5 INJECTION INTRAMUSCULAR; INTRAVENOUS; SUBCUTANEOUS EVERY 5 MIN PRN
Status: DISCONTINUED | OUTPATIENT
Start: 2018-02-19 | End: 2018-02-19 | Stop reason: HOSPADM

## 2018-02-19 RX ORDER — OXYCODONE HYDROCHLORIDE 5 MG/1
5 CAPSULE ORAL EVERY 4 HOURS PRN
Qty: 25 CAPSULE | Refills: 0 | Status: SHIPPED | OUTPATIENT
Start: 2018-02-19 | End: 2018-02-26

## 2018-02-19 RX ORDER — METOCLOPRAMIDE HYDROCHLORIDE 5 MG/ML
10 INJECTION INTRAMUSCULAR; INTRAVENOUS
Status: DISCONTINUED | OUTPATIENT
Start: 2018-02-19 | End: 2018-02-19 | Stop reason: HOSPADM

## 2018-02-19 RX ORDER — SODIUM CHLORIDE, SODIUM LACTATE, POTASSIUM CHLORIDE, CALCIUM CHLORIDE 600; 310; 30; 20 MG/100ML; MG/100ML; MG/100ML; MG/100ML
INJECTION, SOLUTION INTRAVENOUS CONTINUOUS
Status: DISCONTINUED | OUTPATIENT
Start: 2018-02-19 | End: 2018-02-19 | Stop reason: HOSPADM

## 2018-02-19 RX ORDER — MORPHINE SULFATE 2 MG/ML
1 INJECTION, SOLUTION INTRAMUSCULAR; INTRAVENOUS
Status: DISCONTINUED | OUTPATIENT
Start: 2018-02-19 | End: 2018-02-19 | Stop reason: HOSPADM

## 2018-02-19 RX ORDER — FENTANYL CITRATE 50 UG/ML
50 INJECTION, SOLUTION INTRAMUSCULAR; INTRAVENOUS EVERY 10 MIN PRN
Status: DISCONTINUED | OUTPATIENT
Start: 2018-02-19 | End: 2018-02-19 | Stop reason: HOSPADM

## 2018-02-19 RX ORDER — GLYCOPYRROLATE 0.2 MG/ML
INJECTION INTRAMUSCULAR; INTRAVENOUS PRN
Status: DISCONTINUED | OUTPATIENT
Start: 2018-02-19 | End: 2018-02-19 | Stop reason: SDUPTHER

## 2018-02-19 RX ORDER — FENTANYL CITRATE 50 UG/ML
INJECTION, SOLUTION INTRAMUSCULAR; INTRAVENOUS PRN
Status: DISCONTINUED | OUTPATIENT
Start: 2018-02-19 | End: 2018-02-19 | Stop reason: SDUPTHER

## 2018-02-19 RX ORDER — LIDOCAINE HYDROCHLORIDE 10 MG/ML
1 INJECTION, SOLUTION EPIDURAL; INFILTRATION; INTRACAUDAL; PERINEURAL
Status: DISCONTINUED | OUTPATIENT
Start: 2018-02-19 | End: 2018-02-19 | Stop reason: HOSPADM

## 2018-02-19 RX ORDER — OXYCODONE HYDROCHLORIDE 5 MG/1
5 TABLET ORAL EVERY 4 HOURS PRN
Status: DISCONTINUED | OUTPATIENT
Start: 2018-02-19 | End: 2018-02-19 | Stop reason: HOSPADM

## 2018-02-19 RX ORDER — SODIUM CHLORIDE, SODIUM LACTATE, POTASSIUM CHLORIDE, CALCIUM CHLORIDE 600; 310; 30; 20 MG/100ML; MG/100ML; MG/100ML; MG/100ML
INJECTION, SOLUTION INTRAVENOUS
Status: COMPLETED
Start: 2018-02-19 | End: 2018-02-19

## 2018-02-19 RX ORDER — LIDOCAINE HYDROCHLORIDE 10 MG/ML
1 INJECTION, SOLUTION EPIDURAL; INFILTRATION; INTRACAUDAL; PERINEURAL
Status: COMPLETED | OUTPATIENT
Start: 2018-02-19 | End: 2018-02-19

## 2018-02-19 RX ORDER — LIDOCAINE HYDROCHLORIDE 20 MG/ML
INJECTION, SOLUTION INFILTRATION; PERINEURAL PRN
Status: DISCONTINUED | OUTPATIENT
Start: 2018-02-19 | End: 2018-02-19 | Stop reason: SDUPTHER

## 2018-02-19 RX ORDER — LIDOCAINE HYDROCHLORIDE 10 MG/ML
INJECTION, SOLUTION EPIDURAL; INFILTRATION; INTRACAUDAL; PERINEURAL
Status: COMPLETED
Start: 2018-02-19 | End: 2018-02-19

## 2018-02-19 RX ORDER — ROCURONIUM BROMIDE 10 MG/ML
INJECTION, SOLUTION INTRAVENOUS PRN
Status: DISCONTINUED | OUTPATIENT
Start: 2018-02-19 | End: 2018-02-19 | Stop reason: SDUPTHER

## 2018-02-19 RX ORDER — PROPOFOL 10 MG/ML
INJECTION, EMULSION INTRAVENOUS PRN
Status: DISCONTINUED | OUTPATIENT
Start: 2018-02-19 | End: 2018-02-19 | Stop reason: SDUPTHER

## 2018-02-19 RX ADMIN — FENTANYL CITRATE 50 MCG: 50 INJECTION, SOLUTION INTRAMUSCULAR; INTRAVENOUS at 14:10

## 2018-02-19 RX ADMIN — FENTANYL CITRATE 50 MCG: 50 INJECTION, SOLUTION INTRAMUSCULAR; INTRAVENOUS at 11:41

## 2018-02-19 RX ADMIN — LIDOCAINE HYDROCHLORIDE 0.1 ML: 10 INJECTION, SOLUTION EPIDURAL; INFILTRATION; INTRACAUDAL; PERINEURAL at 09:16

## 2018-02-19 RX ADMIN — SODIUM CHLORIDE, POTASSIUM CHLORIDE, SODIUM LACTATE AND CALCIUM CHLORIDE: 600; 310; 30; 20 INJECTION, SOLUTION INTRAVENOUS at 09:17

## 2018-02-19 RX ADMIN — ROCURONIUM BROMIDE 20 MG: 10 INJECTION INTRAVENOUS at 12:49

## 2018-02-19 RX ADMIN — CEFAZOLIN SODIUM 2 G: 2 SOLUTION INTRAVENOUS at 11:35

## 2018-02-19 RX ADMIN — ROCURONIUM BROMIDE 20 MG: 10 INJECTION INTRAVENOUS at 12:25

## 2018-02-19 RX ADMIN — KETOROLAC TROMETHAMINE 30 MG: 30 INJECTION, SOLUTION INTRAMUSCULAR at 09:17

## 2018-02-19 RX ADMIN — SODIUM CHLORIDE, POTASSIUM CHLORIDE, SODIUM LACTATE AND CALCIUM CHLORIDE: 600; 310; 30; 20 INJECTION, SOLUTION INTRAVENOUS at 12:55

## 2018-02-19 RX ADMIN — DEXAMETHASONE SODIUM PHOSPHATE 5 MG: 10 INJECTION INTRAMUSCULAR; INTRAVENOUS at 11:54

## 2018-02-19 RX ADMIN — GLYCOPYRROLATE 0.2 MG: 0.2 INJECTION INTRAMUSCULAR; INTRAVENOUS at 13:24

## 2018-02-19 RX ADMIN — MIDAZOLAM HYDROCHLORIDE 2 MG: 1 INJECTION, SOLUTION INTRAMUSCULAR; INTRAVENOUS at 11:34

## 2018-02-19 RX ADMIN — SUGAMMADEX 200 MG: 100 INJECTION, SOLUTION INTRAVENOUS at 13:22

## 2018-02-19 RX ADMIN — Medication 0.5 MG: at 14:00

## 2018-02-19 RX ADMIN — Medication 0.5 MG: at 13:50

## 2018-02-19 RX ADMIN — FENTANYL CITRATE 100 MCG: 50 INJECTION, SOLUTION INTRAMUSCULAR; INTRAVENOUS at 12:12

## 2018-02-19 RX ADMIN — FENTANYL CITRATE 50 MCG: 50 INJECTION, SOLUTION INTRAMUSCULAR; INTRAVENOUS at 14:20

## 2018-02-19 RX ADMIN — FENTANYL CITRATE 50 MCG: 50 INJECTION, SOLUTION INTRAMUSCULAR; INTRAVENOUS at 12:02

## 2018-02-19 RX ADMIN — ROCURONIUM BROMIDE 40 MG: 10 INJECTION INTRAVENOUS at 11:41

## 2018-02-19 RX ADMIN — LIDOCAINE HYDROCHLORIDE 60 MG: 20 INJECTION, SOLUTION INFILTRATION; PERINEURAL at 11:41

## 2018-02-19 RX ADMIN — PROPOFOL 200 MG: 10 INJECTION, EMULSION INTRAVENOUS at 11:41

## 2018-02-19 RX ADMIN — ONDANSETRON 4 MG: 2 INJECTION INTRAMUSCULAR; INTRAVENOUS at 13:12

## 2018-02-19 ASSESSMENT — PULMONARY FUNCTION TESTS
PIF_VALUE: 19
PIF_VALUE: 14
PIF_VALUE: 22
PIF_VALUE: 26
PIF_VALUE: 23
PIF_VALUE: 21
PIF_VALUE: 13
PIF_VALUE: 24
PIF_VALUE: 25
PIF_VALUE: 24
PIF_VALUE: 21
PIF_VALUE: 1
PIF_VALUE: 21
PIF_VALUE: 18
PIF_VALUE: 13
PIF_VALUE: 24
PIF_VALUE: 7
PIF_VALUE: 18
PIF_VALUE: 26
PIF_VALUE: 21
PIF_VALUE: 21
PIF_VALUE: 1
PIF_VALUE: 21
PIF_VALUE: 3
PIF_VALUE: 23
PIF_VALUE: 19
PIF_VALUE: 21
PIF_VALUE: 18
PIF_VALUE: 18
PIF_VALUE: 13
PIF_VALUE: 0
PIF_VALUE: 13
PIF_VALUE: 22
PIF_VALUE: 18
PIF_VALUE: 22
PIF_VALUE: 23
PIF_VALUE: 18
PIF_VALUE: 8
PIF_VALUE: 26
PIF_VALUE: 21
PIF_VALUE: 22
PIF_VALUE: 21
PIF_VALUE: 18
PIF_VALUE: 18
PIF_VALUE: 20
PIF_VALUE: 0
PIF_VALUE: 22
PIF_VALUE: 23
PIF_VALUE: 19
PIF_VALUE: 23
PIF_VALUE: 23
PIF_VALUE: 22
PIF_VALUE: 25
PIF_VALUE: 22
PIF_VALUE: 24
PIF_VALUE: 19
PIF_VALUE: 24
PIF_VALUE: 19
PIF_VALUE: 21
PIF_VALUE: 14
PIF_VALUE: 18
PIF_VALUE: 13
PIF_VALUE: 21
PIF_VALUE: 1
PIF_VALUE: 14
PIF_VALUE: 23
PIF_VALUE: 22
PIF_VALUE: 21
PIF_VALUE: 24
PIF_VALUE: 24
PIF_VALUE: 23
PIF_VALUE: 18
PIF_VALUE: 11
PIF_VALUE: 23
PIF_VALUE: 22
PIF_VALUE: 18
PIF_VALUE: 18
PIF_VALUE: 19
PIF_VALUE: 1
PIF_VALUE: 21
PIF_VALUE: 19
PIF_VALUE: 20
PIF_VALUE: 21
PIF_VALUE: 18
PIF_VALUE: 22
PIF_VALUE: 19
PIF_VALUE: 11
PIF_VALUE: 14
PIF_VALUE: 21
PIF_VALUE: 22
PIF_VALUE: 21
PIF_VALUE: 20
PIF_VALUE: 21
PIF_VALUE: 18
PIF_VALUE: 19
PIF_VALUE: 20
PIF_VALUE: 18
PIF_VALUE: 14
PIF_VALUE: 17
PIF_VALUE: 23
PIF_VALUE: 22
PIF_VALUE: 21
PIF_VALUE: 18
PIF_VALUE: 21
PIF_VALUE: 0
PIF_VALUE: 21
PIF_VALUE: 19
PIF_VALUE: 18
PIF_VALUE: 21
PIF_VALUE: 22
PIF_VALUE: 19
PIF_VALUE: 15

## 2018-02-19 ASSESSMENT — PAIN SCALES - GENERAL
PAINLEVEL_OUTOF10: 8
PAINLEVEL_OUTOF10: 6
PAINLEVEL_OUTOF10: 7
PAINLEVEL_OUTOF10: 4
PAINLEVEL_OUTOF10: 2
PAINLEVEL_OUTOF10: 8

## 2018-02-19 NOTE — H&P (VIEW-ONLY)
MELISA Conti is a 52 y.o. male who is being seen at the request of Jose Dallas for possible gallbladder disease. The symptoms include intermittent right upper quadrant pain, nausea and vomiting. The patient denies juandice and/or dark urine. The symptoms were first noticed 6 months ago. The pain is rated as a 9 in intensity. The symptoms are stable with time. The patient does have a family history of gallbladder disease. CAT scan was done 1/16/2018 and showed   Cholelithiasis. No CT evidence cholecystitis. Left inguinal and periumbilical hernias, with no CT evidence incarceration or obstruction   He is not on any anticoagulants. Review of Systems   Constitutional: Negative for activity change, appetite change and unexpected weight change. HENT: Negative for congestion, nosebleeds, postnasal drip, rhinorrhea and sneezing. Eyes: Negative. Negative for visual disturbance. Respiratory: Negative for chest tightness and shortness of breath. Cardiovascular: Negative for chest pain and leg swelling. Gastrointestinal: Positive for abdominal pain. Negative for abdominal distention, blood in stool and constipation. Endocrine: Negative. Genitourinary: Negative for difficulty urinating. Musculoskeletal: Negative for arthralgias, gait problem and myalgias. Skin: Negative for color change. Allergic/Immunologic: Negative. Neurological: Negative for dizziness, light-headedness, numbness and headaches. Hematological: Does not bruise/bleed easily. Psychiatric/Behavioral: Negative for sleep disturbance.      Past Medical History:   Diagnosis Date    Chest pressure 3/8/2016    Cholelithiasis     Chronic alcoholic hepatitis     History of alcohol use     Hypertension 3/8/2016     Past Surgical History:   Procedure Laterality Date    CYST REMOVAL      RHINOPLASTY      Deviated Septum    TONSILLECTOMY       Social History   Substance Use Topics    Smoking status: Current Every Day

## 2018-02-19 NOTE — ANESTHESIA POSTPROCEDURE EVALUATION
Department of Anesthesiology  Postprocedure Note    Patient: Debora Can  MRN: [id number]  YOB: 1970  Date of evaluation: 2/19/2018  Time:  1:43 PM     Procedure Summary     Date:  02/19/18 Room / Location:  [address]    Anesthesia Start:  5941 Anesthesia Stop:  7853    Procedure:  LAPAROSCOPIC CHOLECYSTECTOMY WITH GRAMS (N/A ) Diagnosis:  (GALL STONES)    Surgeon:  Pato Hart MD Responsible Provider:  Rica Smallwood MD    Anesthesia Type:  general ASA Status:  3          Anesthesia Type: general    Shanae Phase I: Shanae Score: 7    Shanae Phase II:      Last vitals: Reviewed and per EMR flowsheets.        Anesthesia Post Evaluation    Patient location during evaluation: bedside  Patient participation: complete - patient participated  Level of consciousness: awake and awake and alert  Pain score: 0  Airway patency: patent  Nausea & Vomiting: no nausea and no vomiting  Complications: no  Cardiovascular status: blood pressure returned to baseline and hemodynamically stable  Respiratory status: acceptable  Hydration status: euvolemic

## 2018-02-19 NOTE — ANESTHESIA PRE PROCEDURE
110/74   01/30/18 114/66       NPO Status: Time of last liquid consumption: 0000                        Time of last solid consumption: 0000                        Date of last liquid consumption: 02/18/18                        Date of last solid food consumption: 02/18/18    BMI:   Wt Readings from Last 3 Encounters:   02/19/18 210 lb (95.3 kg)   02/02/18 218 lb 3.2 oz (99 kg)   01/30/18 219 lb (99.3 kg)     Body mass index is 28.48 kg/m². CBC:   Lab Results   Component Value Date    WBC 7.8 02/05/2018    RBC 4.44 02/05/2018    HGB 13.7 02/05/2018    HCT 40.9 02/05/2018    MCV 92.0 02/05/2018    RDW 13.6 02/05/2018     02/05/2018       CMP:   Lab Results   Component Value Date     02/05/2018    K 4.0 02/05/2018     02/05/2018    CO2 25 02/05/2018    BUN 16 02/05/2018    CREATININE 0.57 02/05/2018    GFRAA >60.0 02/05/2018    LABGLOM >60.0 02/05/2018    GLUCOSE 89 02/05/2018    PROT 7.3 02/05/2018    CALCIUM 9.2 02/05/2018    BILITOT 0.3 02/05/2018    ALKPHOS 75 02/05/2018    AST 30 02/05/2018    ALT 21 02/05/2018       POC Tests: No results for input(s): POCGLU, POCNA, POCK, POCCL, POCBUN, POCHEMO, POCHCT in the last 72 hours.     Coags:   Lab Results   Component Value Date    PROTIME 12.0 10/04/2017    INR 1.1 10/04/2017    APTT 25.8 03/06/2016       HCG (If Applicable): No results found for: PREGTESTUR, PREGSERUM, HCG, HCGQUANT     ABGs: No results found for: PHART, PO2ART, GDH0KVP, XWR5AAH, BEART, Z0ESSBRN     Type & Screen (If Applicable):  No results found for: [facility]    Anesthesia Evaluation  Patient summary reviewed and Nursing notes reviewed no history of anesthetic complications:   Airway: Mallampati: II  TM distance: >3 FB   Neck ROM: full  Mouth opening: > = 3 FB Dental: normal exam         Pulmonary:Negative Pulmonary ROS and normal exam                               Cardiovascular:    (+) hypertension: no interval change,                   Neuro/Psych:   Negative

## 2018-02-20 NOTE — OP NOTE
[address]                       [address], [address] [facility]                                 OPERATIVE REPORT    PATIENT NAME: Sean Cohen                        :        1970  MED REC NO:   [id number]                            ROOM:  ACCOUNT NO:   [de-identified]                           ADMIT DATE: 2018  PROVIDER:     Salomon Nunez MD    DATE OF OPERATION:  2018    PREOPERATIVE DIAGNOSIS:  Chronic cholecystitis with cholelithiasis. POSTOPERATIVE DIAGNOSES:  1. Chronic cholecystitis with cholelithiasis. 2.  Hepatocellular disease. OPERATION PERFORMED:  Laparoscopic cholecystectomy with cholangiograms. SURGEON:  Salomon Nunez MD    ANESTHESIA:  General.    COMPLICATIONS:  None. HISTORY:  The patient is a 42-year-old male who has symptomatic  cholelithiasis. After discussing with him his options of therapy, he was  agreeable to cholecystectomy. The procedure of laparoscopic as well as the  possibility of an open procedure were done. Potential risks and  complications including but not exclusive to infection, blood loss, damage  to surrounding structures, bile leakage, stone spillage and even the  possibility of needing further surgery were discussed. He understood and  was agreeable to the procedure. He does have a history of alcoholic hepaitis    OPERATIVE PROCEDURE:  The patient was brought to the operative suite,  placed in the supine position where anesthesia was induced and he was  intubated. Abdomen was prepped and draped in a sterile fashion. Time-out  called. The patient and procedure were properly identified. Time-out  called. The skin was then tented up just above the umbilicus. A small  transverse incision was made and the Veress needle was inserted using drop  saline technique. Insufflation was done with 3 liters of CO2.   A 5-mm  trocar sheath were introduced in this site and a 5-mm scope was placed in  the abdomen. Under direct visualization, there are lot of adhesions in the  right upper quadrant. There are adhesions from the omentum through the  gallbladder as well as to the liver and to the abdominal wall. Under  direct visualization, a 12-mm port was placed in the mid epigastric area  and I proceeded to take down the adhesions in the right upper quadrant to  allow the placement of two other ports. Once the adhesions were taken down  to the abdominal wall under direct visualization, two 5-mm ports were  placed in the right upper quadrant in the usual position. The gallbladder  was then identified. Adhesions were taken down around the gallbladder. The gallbladder was noted to be enlarged. Dissection of the ponce hepatis  was then done and the cystic duct and artery were identified. The artery  was clipped in continuity and transected and cystic duct cholangiogram was  obtained and it was read as normal by the radiologist.  The cystic duct was  clipped x2 a centimeter from the common duct and transected. The cystic  artery was clipped in continuity and transected. The gallbladder was then  removed from the liver bed using electrocautery for hemostasis. Afterwards, it was placed into an EndoCatch bag and brought out through the  working port site. Afterwards, irrigation was done. There was excellent  hemostasis. No evidence of any bile leakage. It was noted that the liver  was nodular and firm. Some tissue from the liver was  removed at the time of removing the gallbladder. Sponge, needle, instrument counts were correct and a 15-[place] round  Codey-Chi drain was placed through the midclavicular port site in the  Morison's pouch and sutured the skin with 3-0 Prolene. Afterwards, al CO2  was removed from the abdominal cavity and the remaining three ports were  closed with 3-0 Vicryl and 4-0 Monocryl with Dermaflex on the skin edges.    He tolerated the procedures well with recovery in stable condition.         Rdaha Pichardo MD    D: 02/19/2018 20:00:54       T: 02/19/2018 20:20:30     [email or web address]  Job#: [id number]     Doc#: [id number]    CC:  Renetta Vásquez NP

## 2018-02-22 ENCOUNTER — OFFICE VISIT (OUTPATIENT)
Dept: SURGERY | Age: 48
End: 2018-02-22

## 2018-02-22 VITALS
HEART RATE: 85 BPM | SYSTOLIC BLOOD PRESSURE: 110 MMHG | DIASTOLIC BLOOD PRESSURE: 60 MMHG | BODY MASS INDEX: 29.53 KG/M2 | HEIGHT: 72 IN | TEMPERATURE: 98.6 F | WEIGHT: 218 LBS

## 2018-02-22 DIAGNOSIS — K80.10 CHRONIC CHOLECYSTITIS WITH CALCULUS: Primary | ICD-10-CM

## 2018-02-22 PROCEDURE — 99024 POSTOP FOLLOW-UP VISIT: CPT | Performed by: SURGERY

## 2018-02-22 NOTE — LETTER
[facility]  [address]  Phone: [phone]  Fax: [phone]    Elvis Rivera MD        February 22, 2018     Patient: Terrence Qureshi   YOB: 1970   Date of Visit: 2/22/2018       To Whom It May Concern: It is my medical opinion that Terrence Qureshi may return to full duty immediately with no restrictions. If you have any questions or concerns, please don't hesitate to call.     Sincerely,    Elvis Rivera MD   Electronically signed by Viri Christina, 3:25 PM 2/22/18

## 2018-02-22 NOTE — PROGRESS NOTES
Subjective:      Patient ID: Petr Leslie is a 52 y.o. male. HPI   Petr Leslie returns in follow up of laparoscopic cholecystectomy with cholangiograms performed on 2/19/2018. Intra-operative cholangiogram was normal .The pathology report is pending. Pain is rated as a 2. His appetite is Fair +. The patient denies juandice, fever, chills or sweats. The patient has not returned to normal activities. He has a drain that is putting out greater than 100 cc per day of serosanguinous fluid. He is also leaking fluid around his drain site    Review of Systems    Objective:   Physical Exam   Constitutional: He is oriented to person, place, and time. He appears well-developed and well-nourished. No distress. Abdominal:   Incisions are well approximated, erythema is not present, swelling is mild, no evidence of hernia, tenderness is mild, serosanguinous drainage is present in and around the GERMANIA drain tube, skin glue/sutures are present   Musculoskeletal:   Normal gait   Neurological: He is alert and oriented to person, place, and time. Psychiatric: He has a normal mood and affect. Judgment normal.     /60   Pulse 85   Temp 98.6 °F (37 °C) (Temporal)   Ht 6' (1.829 m)   Wt 218 lb (98.9 kg)   BMI 29.57 kg/m²   Assessment:      Leakage around the GERMANIA exit site      Plan:      Placed a 3-0 prolene suture at the drain site  The patient is instructed to return to see me in 4 days.

## 2018-02-26 ENCOUNTER — OFFICE VISIT (OUTPATIENT)
Dept: SURGERY | Age: 48
End: 2018-02-26

## 2018-02-26 VITALS
DIASTOLIC BLOOD PRESSURE: 70 MMHG | WEIGHT: 212 LBS | TEMPERATURE: 98.6 F | BODY MASS INDEX: 28.71 KG/M2 | SYSTOLIC BLOOD PRESSURE: 104 MMHG | HEIGHT: 72 IN

## 2018-02-26 DIAGNOSIS — K80.10 CHRONIC CHOLECYSTITIS WITH CALCULUS: Primary | ICD-10-CM

## 2018-02-26 PROCEDURE — 99024 POSTOP FOLLOW-UP VISIT: CPT | Performed by: SURGERY

## 2018-03-08 ENCOUNTER — OFFICE VISIT (OUTPATIENT)
Dept: SURGERY | Age: 48
End: 2018-03-08

## 2018-03-08 VITALS
TEMPERATURE: 98.6 F | SYSTOLIC BLOOD PRESSURE: 104 MMHG | DIASTOLIC BLOOD PRESSURE: 68 MMHG | BODY MASS INDEX: 28.99 KG/M2 | WEIGHT: 214 LBS | HEIGHT: 72 IN

## 2018-03-08 DIAGNOSIS — K80.10 CHRONIC CHOLECYSTITIS WITH CALCULUS: Primary | ICD-10-CM

## 2018-03-08 PROCEDURE — 99024 POSTOP FOLLOW-UP VISIT: CPT | Performed by: SURGERY

## 2018-06-29 ENCOUNTER — OFFICE VISIT (OUTPATIENT)
Dept: FAMILY MEDICINE CLINIC | Age: 48
End: 2018-06-29
Payer: COMMERCIAL

## 2018-06-29 VITALS
DIASTOLIC BLOOD PRESSURE: 88 MMHG | OXYGEN SATURATION: 99 % | HEART RATE: 63 BPM | SYSTOLIC BLOOD PRESSURE: 138 MMHG | HEIGHT: 72 IN | BODY MASS INDEX: 29.12 KG/M2 | TEMPERATURE: 97.8 F | WEIGHT: 215 LBS

## 2018-06-29 DIAGNOSIS — I10 ESSENTIAL HYPERTENSION: ICD-10-CM

## 2018-06-29 DIAGNOSIS — F41.9 ANXIETY: Primary | ICD-10-CM

## 2018-06-29 DIAGNOSIS — K70.10 CHRONIC ALCOHOLIC HEPATITIS: ICD-10-CM

## 2018-06-29 DIAGNOSIS — G57.01 COMPRESSION OF COMMON PERONEAL NERVE OF RIGHT LOWER EXTREMITY: ICD-10-CM

## 2018-06-29 PROCEDURE — G8427 DOCREV CUR MEDS BY ELIG CLIN: HCPCS | Performed by: NURSE PRACTITIONER

## 2018-06-29 PROCEDURE — 99213 OFFICE O/P EST LOW 20 MIN: CPT | Performed by: NURSE PRACTITIONER

## 2018-06-29 PROCEDURE — G8417 CALC BMI ABV UP PARAM F/U: HCPCS | Performed by: NURSE PRACTITIONER

## 2018-06-29 PROCEDURE — 4004F PT TOBACCO SCREEN RCVD TLK: CPT | Performed by: NURSE PRACTITIONER

## 2018-06-29 RX ORDER — HYDROXYZINE HYDROCHLORIDE 25 MG/1
25 TABLET, FILM COATED ORAL 3 TIMES DAILY PRN
Qty: 15 TABLET | Refills: 0 | Status: SHIPPED | OUTPATIENT
Start: 2018-06-29 | End: 2018-07-24 | Stop reason: SDUPTHER

## 2018-06-29 ASSESSMENT — ENCOUNTER SYMPTOMS
COUGH: 0
SHORTNESS OF BREATH: 0
ABDOMINAL PAIN: 0

## 2018-07-24 DIAGNOSIS — F41.9 ANXIETY: ICD-10-CM

## 2018-07-24 RX ORDER — HYDROXYZINE HYDROCHLORIDE 25 MG/1
25 TABLET, FILM COATED ORAL 3 TIMES DAILY PRN
Qty: 15 TABLET | Refills: 0 | Status: SHIPPED | OUTPATIENT
Start: 2018-07-24 | End: 2018-08-21 | Stop reason: SDUPTHER

## 2018-08-21 DIAGNOSIS — F41.9 ANXIETY: ICD-10-CM

## 2018-08-21 NOTE — TELEPHONE ENCOUNTER
Hydroxyzine, pt states that he would like the updated tablet count. Can you call pt to let him know when this is filled.

## 2018-08-23 RX ORDER — HYDROXYZINE HYDROCHLORIDE 25 MG/1
25 TABLET, FILM COATED ORAL 3 TIMES DAILY PRN
Qty: 30 TABLET | Refills: 0 | Status: SHIPPED | OUTPATIENT
Start: 2018-08-23 | End: 2018-09-27 | Stop reason: SDUPTHER

## 2018-09-27 DIAGNOSIS — F41.9 ANXIETY: ICD-10-CM

## 2018-09-27 RX ORDER — HYDROXYZINE HYDROCHLORIDE 25 MG/1
25 TABLET, FILM COATED ORAL 3 TIMES DAILY PRN
Qty: 30 TABLET | Refills: 0 | Status: SHIPPED | OUTPATIENT
Start: 2018-09-27 | End: 2018-10-27

## 2018-11-01 DIAGNOSIS — F41.9 ANXIETY: ICD-10-CM

## 2018-11-01 RX ORDER — HYDROXYZINE PAMOATE 25 MG/1
25 CAPSULE ORAL 3 TIMES DAILY PRN
Qty: 15 CAPSULE | Refills: 0 | Status: SHIPPED | OUTPATIENT
Start: 2018-11-01 | End: 2019-01-03 | Stop reason: SDUPTHER

## 2019-01-03 ENCOUNTER — OFFICE VISIT (OUTPATIENT)
Dept: FAMILY MEDICINE CLINIC | Age: 49
End: 2019-01-03
Payer: COMMERCIAL

## 2019-01-03 VITALS
TEMPERATURE: 98.6 F | OXYGEN SATURATION: 98 % | DIASTOLIC BLOOD PRESSURE: 84 MMHG | HEIGHT: 72 IN | SYSTOLIC BLOOD PRESSURE: 116 MMHG | BODY MASS INDEX: 30.2 KG/M2 | HEART RATE: 97 BPM | WEIGHT: 223 LBS

## 2019-01-03 DIAGNOSIS — K70.10 CHRONIC ALCOHOLIC HEPATITIS: Primary | ICD-10-CM

## 2019-01-03 DIAGNOSIS — Z87.898 HISTORY OF ALCOHOL USE: ICD-10-CM

## 2019-01-03 DIAGNOSIS — F41.9 ANXIETY: ICD-10-CM

## 2019-01-03 DIAGNOSIS — Z13.220 LIPID SCREENING: ICD-10-CM

## 2019-01-03 PROCEDURE — G8484 FLU IMMUNIZE NO ADMIN: HCPCS | Performed by: NURSE PRACTITIONER

## 2019-01-03 PROCEDURE — G8427 DOCREV CUR MEDS BY ELIG CLIN: HCPCS | Performed by: NURSE PRACTITIONER

## 2019-01-03 PROCEDURE — G8417 CALC BMI ABV UP PARAM F/U: HCPCS | Performed by: NURSE PRACTITIONER

## 2019-01-03 PROCEDURE — 4004F PT TOBACCO SCREEN RCVD TLK: CPT | Performed by: NURSE PRACTITIONER

## 2019-01-03 PROCEDURE — 99213 OFFICE O/P EST LOW 20 MIN: CPT | Performed by: NURSE PRACTITIONER

## 2019-01-03 RX ORDER — HYDROXYZINE PAMOATE 25 MG/1
25 CAPSULE ORAL 3 TIMES DAILY PRN
Qty: 15 CAPSULE | Refills: 0 | Status: SHIPPED | OUTPATIENT
Start: 2019-01-03 | End: 2020-04-21 | Stop reason: SDUPTHER

## 2019-01-03 ASSESSMENT — ENCOUNTER SYMPTOMS
SHORTNESS OF BREATH: 0
NAUSEA: 0
ABDOMINAL DISTENTION: 0
ABDOMINAL PAIN: 0
COUGH: 0
VOMITING: 0
CONSTIPATION: 0
DIARRHEA: 0

## 2019-01-03 ASSESSMENT — PATIENT HEALTH QUESTIONNAIRE - PHQ9
SUM OF ALL RESPONSES TO PHQ QUESTIONS 1-9: 0
SUM OF ALL RESPONSES TO PHQ9 QUESTIONS 1 & 2: 0
1. LITTLE INTEREST OR PLEASURE IN DOING THINGS: 0
SUM OF ALL RESPONSES TO PHQ QUESTIONS 1-9: 0
2. FEELING DOWN, DEPRESSED OR HOPELESS: 0

## 2019-01-08 DIAGNOSIS — K70.10 CHRONIC ALCOHOLIC HEPATITIS: ICD-10-CM

## 2019-01-08 DIAGNOSIS — Z13.220 LIPID SCREENING: ICD-10-CM

## 2019-01-08 DIAGNOSIS — Z87.898 HISTORY OF ALCOHOL USE: ICD-10-CM

## 2019-01-08 LAB
ALBUMIN SERPL-MCNC: 4.1 G/DL (ref 3.9–4.9)
ALP BLD-CCNC: 86 U/L (ref 35–104)
ALT SERPL-CCNC: 39 U/L (ref 0–41)
ANION GAP SERPL CALCULATED.3IONS-SCNC: 15 MEQ/L (ref 7–13)
AST SERPL-CCNC: 41 U/L (ref 0–40)
BASOPHILS ABSOLUTE: 0 K/UL (ref 0–0.2)
BASOPHILS RELATIVE PERCENT: 0.5 %
BILIRUB SERPL-MCNC: 0.6 MG/DL (ref 0–1.2)
BUN BLDV-MCNC: 10 MG/DL (ref 6–20)
CALCIUM SERPL-MCNC: 9.6 MG/DL (ref 8.6–10.2)
CHLORIDE BLD-SCNC: 104 MEQ/L (ref 98–107)
CHOLESTEROL, TOTAL: 245 MG/DL (ref 0–199)
CO2: 27 MEQ/L (ref 22–29)
CREAT SERPL-MCNC: 0.64 MG/DL (ref 0.7–1.2)
EOSINOPHILS ABSOLUTE: 0.5 K/UL (ref 0–0.7)
EOSINOPHILS RELATIVE PERCENT: 6.1 %
GFR AFRICAN AMERICAN: >60
GFR NON-AFRICAN AMERICAN: >60
GLOBULIN: 3.6 G/DL (ref 2.3–3.5)
GLUCOSE BLD-MCNC: 94 MG/DL (ref 74–109)
HCT VFR BLD CALC: 45.2 % (ref 42–52)
HDLC SERPL-MCNC: 60 MG/DL (ref 40–59)
HEMOGLOBIN: 15.6 G/DL (ref 14–18)
LDL CHOLESTEROL CALCULATED: 153 MG/DL (ref 0–129)
LYMPHOCYTES ABSOLUTE: 2.5 K/UL (ref 1–4.8)
LYMPHOCYTES RELATIVE PERCENT: 33.4 %
MCH RBC QN AUTO: 33.1 PG (ref 27–31.3)
MCHC RBC AUTO-ENTMCNC: 34.6 % (ref 33–37)
MCV RBC AUTO: 95.6 FL (ref 80–100)
MONOCYTES ABSOLUTE: 0.5 K/UL (ref 0.2–0.8)
MONOCYTES RELATIVE PERCENT: 6.3 %
NEUTROPHILS ABSOLUTE: 4 K/UL (ref 1.4–6.5)
NEUTROPHILS RELATIVE PERCENT: 53.7 %
PDW BLD-RTO: 13 % (ref 11.5–14.5)
PLATELET # BLD: 205 K/UL (ref 130–400)
POTASSIUM SERPL-SCNC: 4.9 MEQ/L (ref 3.5–5.1)
RBC # BLD: 4.72 M/UL (ref 4.7–6.1)
SODIUM BLD-SCNC: 146 MEQ/L (ref 132–144)
TOTAL PROTEIN: 7.7 G/DL (ref 6.4–8.1)
TRIGL SERPL-MCNC: 159 MG/DL (ref 0–200)
WBC # BLD: 7.5 K/UL (ref 4.8–10.8)

## 2019-09-12 ENCOUNTER — OFFICE VISIT (OUTPATIENT)
Dept: FAMILY MEDICINE CLINIC | Age: 49
End: 2019-09-12
Payer: COMMERCIAL

## 2019-09-12 VITALS
HEART RATE: 93 BPM | HEIGHT: 72 IN | WEIGHT: 215 LBS | SYSTOLIC BLOOD PRESSURE: 142 MMHG | OXYGEN SATURATION: 98 % | BODY MASS INDEX: 29.12 KG/M2 | TEMPERATURE: 97.7 F | DIASTOLIC BLOOD PRESSURE: 94 MMHG

## 2019-09-12 DIAGNOSIS — M79.651 PAIN IN RIGHT THIGH: Primary | ICD-10-CM

## 2019-09-12 PROCEDURE — 99213 OFFICE O/P EST LOW 20 MIN: CPT | Performed by: NURSE PRACTITIONER

## 2019-09-12 PROCEDURE — 4004F PT TOBACCO SCREEN RCVD TLK: CPT | Performed by: NURSE PRACTITIONER

## 2019-09-12 PROCEDURE — G8417 CALC BMI ABV UP PARAM F/U: HCPCS | Performed by: NURSE PRACTITIONER

## 2019-09-12 PROCEDURE — G8427 DOCREV CUR MEDS BY ELIG CLIN: HCPCS | Performed by: NURSE PRACTITIONER

## 2019-09-12 RX ORDER — NAPROXEN 500 MG/1
500 TABLET ORAL 2 TIMES DAILY WITH MEALS
Qty: 60 TABLET | Refills: 3 | Status: SHIPPED | OUTPATIENT
Start: 2019-09-12 | End: 2020-07-30

## 2019-09-12 ASSESSMENT — ENCOUNTER SYMPTOMS
SHORTNESS OF BREATH: 0
COUGH: 0

## 2019-09-12 NOTE — PROGRESS NOTES
Subjective  Chief Complaint   Patient presents with    Leg Pain     states that he has been having RT leg pain for months now states that it startes in his groin and works it way down. states that it gets so bad that he has to sit down or pull his leg up. states that it is ruining his golf game. Leg Pain    The incident occurred more than 1 week ago (a few months). Incident location: no injury. There was no injury mechanism. The pain is present in the right thigh and right leg (starts in right groin and travels down his entire right leg down to his calf, no back pain or hip pain). The quality of the pain is described as aching (throbbing). Associated symptoms comments: Denies any redness, warmth or swelling to the area  Only present when he is standing. Treatments tried: stretching his leg helps, sitting takes the pressure off and the pain goes away.        Past Medical History:   Diagnosis Date    Chest pressure 3/8/2016    Cholelithiasis     Chronic alcoholic hepatitis     History of alcohol use     Hypertension 3/8/2016     Patient Active Problem List    Diagnosis Date Noted    Chronic cholecystitis with calculus 02/02/2018    History of alcohol use     Chronic alcoholic hepatitis     Hypertension 03/08/2016    Chest pressure 03/08/2016     Past Surgical History:   Procedure Laterality Date    CYST REMOVAL      [place] LAP,CHOLECYSTECTOMY/GRAPH N/A 2/19/2018    LAPAROSCOPIC CHOLECYSTECTOMY WITH GRAMS performed by Niru Irizarry MD at [address] [place] [address]      Deviated Septum    TONSILLECTOMY       Family History   Problem Relation Age of Onset    Alcohol Abuse Father     Diabetes Brother     Heart Attack Paternal Grandfather      Social History     Socioeconomic History    Marital status:      Spouse name: None    Number of children: None    Years of education: None    Highest education level: None   Occupational History    None   Social Needs    Financial resource strain: None Site: Left Upper Arm Left Upper Arm   Position: Sitting Sitting   Cuff Size: Large Adult Large Adult   Pulse: 93    Temp: 97.7 °F (36.5 °C)    TempSrc: Tympanic    SpO2: 98%    Weight: 215 lb (97.5 kg)    Height: 6' (1.829 m)      Physical Exam   Constitutional: He is oriented to person, place, and time. He appears well-developed and well-nourished. No distress. HENT:   Head: Normocephalic and atraumatic. Right Ear: External ear normal.   Left Ear: External ear normal.   Cardiovascular: Normal rate, regular rhythm and normal heart sounds. Pulmonary/Chest: Effort normal and breath sounds normal. No respiratory distress. Musculoskeletal: Normal range of motion. He exhibits no edema. Legs:  Neurological: He is alert and oriented to person, place, and time. No cranial nerve deficit. Skin: Skin is warm and dry. Capillary refill takes less than 2 seconds. No rash noted. He is not diaphoretic. No erythema. No pallor. Psychiatric: He has a normal mood and affect. His behavior is normal. Judgment and thought content normal.       Assessment& Plan    1. Pain in right thigh  Will do US as ordered to f/o vascular etiology regarding leg pain. High suspicion that this could also be musculoskeletal.  Lumbar xray as ordered. Naproxen twice daily with food for 2 weeks. Stretching exercises discussed. F/u in 2 weeks to recheck, go over results and recheck bp.  - XR LUMBAR SPINE (MIN 4 VIEWS); Future  - US DUP LOWER EXTREMITY RIGHT LORI; Future  - US DUP LOWER EXTREMITY RIGHT ARTERIES; Future  - naproxen (NAPROSYN) 500 MG tablet; Take 1 tablet by mouth 2 times daily (with meals)  Dispense: 60 tablet; Refill: 3    Side effects, adverse effects of the medication prescribed today, as well as treatment plan/ rationale and result expectations have been discussed with the patient who expresses understanding and desires to proceed. Close follow up to evaluate treatment results and for coordination of care.   I have

## 2019-09-24 ENCOUNTER — HOSPITAL ENCOUNTER (OUTPATIENT)
Dept: ULTRASOUND IMAGING | Age: 49
Discharge: HOME | End: 2019-09-26
Payer: COMMERCIAL

## 2019-09-24 ENCOUNTER — HOSPITAL ENCOUNTER (OUTPATIENT)
Dept: GENERAL RADIOLOGY | Age: 49
Discharge: HOME | End: 2019-09-26
Payer: COMMERCIAL

## 2019-09-24 DIAGNOSIS — M79.651 PAIN IN RIGHT THIGH: ICD-10-CM

## 2019-09-24 PROCEDURE — 93971 EXTREMITY STUDY: CPT

## 2019-09-24 PROCEDURE — 72110 X-RAY EXAM L-2 SPINE 4/>VWS: CPT

## 2019-09-24 PROCEDURE — 93926 LOWER EXTREMITY STUDY: CPT

## 2020-02-20 ENCOUNTER — HOSPITAL ENCOUNTER (EMERGENCY)
Age: 50
Discharge: HOME | End: 2020-02-20
Attending: EMERGENCY MEDICINE
Payer: COMMERCIAL

## 2020-02-20 VITALS
BODY MASS INDEX: 31.07 KG/M2 | HEIGHT: 68 IN | RESPIRATION RATE: 20 BRPM | TEMPERATURE: 97.8 F | SYSTOLIC BLOOD PRESSURE: 128 MMHG | WEIGHT: 205 LBS | DIASTOLIC BLOOD PRESSURE: 84 MMHG | OXYGEN SATURATION: 98 % | HEART RATE: 74 BPM

## 2020-02-20 LAB
ALBUMIN SERPL-MCNC: 4 G/DL (ref 3.5–4.6)
ALP BLD-CCNC: 88 U/L (ref 35–104)
ALT SERPL-CCNC: 55 U/L (ref 0–41)
ANION GAP SERPL CALCULATED.3IONS-SCNC: 19 MEQ/L (ref 9–15)
ANISOCYTOSIS: ABNORMAL
AST SERPL-CCNC: 120 U/L (ref 0–40)
ATYPICAL LYMPHOCYTE RELATIVE PERCENT: 2 %
BASOPHILS ABSOLUTE: 0.2 K/UL (ref 0–0.2)
BASOPHILS RELATIVE PERCENT: 3 %
BILIRUB SERPL-MCNC: 0.7 MG/DL (ref 0.2–0.7)
BUN BLDV-MCNC: 6 MG/DL (ref 6–20)
CALCIUM SERPL-MCNC: 9.2 MG/DL (ref 8.5–9.9)
CHLORIDE BLD-SCNC: 97 MEQ/L (ref 95–107)
CO2: 27 MEQ/L (ref 20–31)
CREAT SERPL-MCNC: 0.43 MG/DL (ref 0.7–1.2)
EKG ATRIAL RATE: 58 BPM
EKG P AXIS: 37 DEGREES
EKG P-R INTERVAL: 212 MS
EKG Q-T INTERVAL: 452 MS
EKG QRS DURATION: 86 MS
EKG QTC CALCULATION (BAZETT): 443 MS
EKG R AXIS: 48 DEGREES
EKG T AXIS: 49 DEGREES
EKG VENTRICULAR RATE: 58 BPM
EOSINOPHILS ABSOLUTE: 0.1 K/UL (ref 0–0.7)
EOSINOPHILS RELATIVE PERCENT: 2 %
GFR AFRICAN AMERICAN: >60
GFR NON-AFRICAN AMERICAN: >60
GLOBULIN: 3.8 G/DL (ref 2.3–3.5)
GLUCOSE BLD-MCNC: 101 MG/DL (ref 70–99)
HCT VFR BLD CALC: 42.5 % (ref 42–52)
HEMOGLOBIN: 14.9 G/DL (ref 14–18)
LYMPHOCYTES ABSOLUTE: 2.1 K/UL (ref 1–4.8)
LYMPHOCYTES RELATIVE PERCENT: 37 %
MCH RBC QN AUTO: 34.2 PG (ref 27–31.3)
MCHC RBC AUTO-ENTMCNC: 35 % (ref 33–37)
MCV RBC AUTO: 97.7 FL (ref 80–100)
MONOCYTES ABSOLUTE: 0.2 K/UL (ref 0.2–0.8)
MONOCYTES RELATIVE PERCENT: 3.5 %
NEUTROPHILS ABSOLUTE: 2.9 K/UL (ref 1.4–6.5)
NEUTROPHILS RELATIVE PERCENT: 54 %
PDW BLD-RTO: 13.4 % (ref 11.5–14.5)
PLATELET # BLD: 94 K/UL (ref 130–400)
PLATELET SLIDE REVIEW: ABNORMAL
POTASSIUM SERPL-SCNC: 2.9 MEQ/L (ref 3.4–4.9)
RBC # BLD: 4.35 M/UL (ref 4.7–6.1)
SLIDE REVIEW: ABNORMAL
SMUDGE CELLS: 6.1
SODIUM BLD-SCNC: 143 MEQ/L (ref 135–144)
TOTAL PROTEIN: 7.8 G/DL (ref 6.3–8)
TROPONIN: <0.01 NG/ML (ref 0–0.01)
WBC # BLD: 5.3 K/UL (ref 4.8–10.8)

## 2020-02-20 PROCEDURE — 6370000000 HC RX 637 (ALT 250 FOR IP): Performed by: EMERGENCY MEDICINE

## 2020-02-20 PROCEDURE — 2580000003 HC RX 258: Performed by: EMERGENCY MEDICINE

## 2020-02-20 PROCEDURE — 99284 EMERGENCY DEPT VISIT MOD MDM: CPT

## 2020-02-20 PROCEDURE — 93005 ELECTROCARDIOGRAM TRACING: CPT | Performed by: EMERGENCY MEDICINE

## 2020-02-20 PROCEDURE — 85025 COMPLETE CBC W/AUTO DIFF WBC: CPT

## 2020-02-20 PROCEDURE — 36415 COLL VENOUS BLD VENIPUNCTURE: CPT

## 2020-02-20 PROCEDURE — 96360 HYDRATION IV INFUSION INIT: CPT

## 2020-02-20 PROCEDURE — 80053 COMPREHEN METABOLIC PANEL: CPT

## 2020-02-20 PROCEDURE — 84484 ASSAY OF TROPONIN QUANT: CPT

## 2020-02-20 RX ORDER — 0.9 % SODIUM CHLORIDE 0.9 %
1000 INTRAVENOUS SOLUTION INTRAVENOUS ONCE
Status: COMPLETED | OUTPATIENT
Start: 2020-02-20 | End: 2020-02-20

## 2020-02-20 RX ORDER — POTASSIUM CHLORIDE 20 MEQ/1
20 TABLET, EXTENDED RELEASE ORAL DAILY
Qty: 10 TABLET | Refills: 0 | Status: SHIPPED | OUTPATIENT
Start: 2020-02-20 | End: 2020-07-30

## 2020-02-20 RX ADMIN — POTASSIUM BICARBONATE 40 MEQ: 782 TABLET, EFFERVESCENT ORAL at 03:10

## 2020-02-20 RX ADMIN — SODIUM CHLORIDE 1000 ML: 9 INJECTION, SOLUTION INTRAVENOUS at 02:10

## 2020-02-20 ASSESSMENT — ENCOUNTER SYMPTOMS
SHORTNESS OF BREATH: 1
SORE THROAT: 0
WHEEZING: 0
CHEST TIGHTNESS: 0
ABDOMINAL PAIN: 0
PHOTOPHOBIA: 0
EYE DISCHARGE: 0
ABDOMINAL DISTENTION: 0
COUGH: 0
VOMITING: 0

## 2020-02-20 NOTE — ED PROVIDER NOTES
Musculoskeletal: Negative for arthralgias. Skin: Negative for pallor and rash. Allergic/Immunologic: Negative for immunocompromised state. Neurological: Negative for dizziness and syncope. Hematological: Negative for adenopathy. Psychiatric/Behavioral: Negative for agitation and hallucinations. All other systems reviewed and are negative. Except as noted above the remainder of the review of systems was reviewed and negative. PAST MEDICAL HISTORY     Past Medical History:   Diagnosis Date    Chest pressure 3/8/2016    Cholelithiasis     Chronic alcoholic hepatitis     History of alcohol use     Hypertension 3/8/2016         SURGICALHISTORY       Past Surgical History:   Procedure Laterality Date    CYST REMOVAL      [place] LAP,CHOLECYSTECTOMY/GRAPH N/A 2/19/2018    LAPAROSCOPIC CHOLECYSTECTOMY WITH GRAMS performed by Naveen Juarez MD at [address] [place] [address]      Deviated Septum    TONSILLECTOMY           CURRENT MEDICATIONS       Previous Medications    MULTIPLE VITAMIN (MULTI VITAMIN PO)    Take by mouth    NAPROXEN (NAPROSYN) 500 MG TABLET    Take 1 tablet by mouth 2 times daily (with meals)    PROAIR  (90 BASE) MCG/ACT INHALER    INHALE 2 PUFFS BY MOUTH 4 TIMES DAILY AS NEEDED FOR SHORTNESS OF BREATH.        ALLERGIES     Tylenol [acetaminophen]    FAMILY HISTORY       Family History   Problem Relation Age of Onset    Alcohol Abuse Father     Diabetes Brother     Heart Attack Paternal Grandfather           SOCIAL HISTORY       Social History     Socioeconomic History    Marital status:      Spouse name: None    Number of children: None    Years of education: None    Highest education level: None   Occupational History    None   Social Needs    Financial resource strain: None    Food insecurity:     Worry: None     Inability: None    Transportation needs:     Medical: None     Non-medical: None   Tobacco Use    Smoking status: Current Every Day Smoker Packs/day: 0.50     Types: Cigarettes    Smokeless tobacco: Never Used   Substance and Sexual Activity    Alcohol use: No    Drug use: No    Sexual activity: None   Lifestyle    Physical activity:     Days per week: None     Minutes per session: None    Stress: None   Relationships    Social connections:     Talks on phone: None     Gets together: None     Attends Hinduism service: None     Active member of club or organization: None     Attends meetings of clubs or organizations: None     Relationship status: None    Intimate partner violence:     Fear of current or ex partner: None     Emotionally abused: None     Physically abused: None     Forced sexual activity: None   Other Topics Concern    None   Social History Narrative    None       SCREENINGS      @FLOW([id number])@      PHYSICAL EXAM    (up to 7 for level 4, 8 or more for level 5)     ED Triage Vitals [02/20/20 0126]   BP Temp Temp Source Pulse Resp SpO2 Height Weight   (!) 137/91 97.8 °F (36.6 °C) Oral 72 20 98 % 5' 8\" (1.727 m) 205 lb (93 kg)       Physical Exam  Vitals signs and nursing note reviewed. Constitutional:       Appearance: He is well-developed. Comments: Patient smells of alcohol but is not slurring his speech. HENT:      Head: Normocephalic. Nose: Nose normal.   Eyes:      Conjunctiva/sclera: Conjunctivae normal.      Pupils: Pupils are equal, round, and reactive to light. Neck:      Musculoskeletal: Normal range of motion and neck supple. Cardiovascular:      Rate and Rhythm: Normal rate and regular rhythm. Heart sounds: Normal heart sounds. Pulmonary:      Effort: Pulmonary effort is normal.      Breath sounds: Normal breath sounds. Abdominal:      General: Bowel sounds are normal.      Palpations: Abdomen is soft. Tenderness: There is no abdominal tenderness. There is no guarding. Musculoskeletal: Normal range of motion. Skin:     General: Skin is warm and dry.       Capillary Refill:

## 2020-02-20 NOTE — ED NOTES
Dr. Miah Silvestre at the bedside at this time. Assessment completed.       Na Bustos RN  02/20/20 1995

## 2020-02-21 PROCEDURE — 93010 ELECTROCARDIOGRAM REPORT: CPT | Performed by: INTERNAL MEDICINE

## 2020-04-21 RX ORDER — HYDROXYZINE PAMOATE 25 MG/1
25 CAPSULE ORAL 3 TIMES DAILY PRN
Qty: 15 CAPSULE | Refills: 0 | Status: SHIPPED | OUTPATIENT
Start: 2020-04-21 | End: 2020-05-05

## 2020-05-18 ENCOUNTER — HOSPITAL ENCOUNTER (OUTPATIENT)
Dept: MRI IMAGING | Age: 50
Discharge: HOME | End: 2020-05-20
Payer: COMMERCIAL

## 2020-05-18 PROCEDURE — 72148 MRI LUMBAR SPINE W/O DYE: CPT

## 2020-07-30 ENCOUNTER — OFFICE VISIT (OUTPATIENT)
Dept: FAMILY MEDICINE CLINIC | Age: 50
End: 2020-07-30
Payer: COMMERCIAL

## 2020-07-30 VITALS
BODY MASS INDEX: 25.81 KG/M2 | OXYGEN SATURATION: 97 % | HEART RATE: 87 BPM | DIASTOLIC BLOOD PRESSURE: 80 MMHG | SYSTOLIC BLOOD PRESSURE: 120 MMHG | WEIGHT: 190.6 LBS | TEMPERATURE: 98.6 F | HEIGHT: 72 IN

## 2020-07-30 PROCEDURE — G8427 DOCREV CUR MEDS BY ELIG CLIN: HCPCS | Performed by: FAMILY MEDICINE

## 2020-07-30 PROCEDURE — 99213 OFFICE O/P EST LOW 20 MIN: CPT | Performed by: FAMILY MEDICINE

## 2020-07-30 PROCEDURE — 4004F PT TOBACCO SCREEN RCVD TLK: CPT | Performed by: FAMILY MEDICINE

## 2020-07-30 PROCEDURE — G8417 CALC BMI ABV UP PARAM F/U: HCPCS | Performed by: FAMILY MEDICINE

## 2020-07-30 RX ORDER — GABAPENTIN 300 MG/1
300 CAPSULE ORAL
COMMUNITY
Start: 2020-07-10 | End: 2020-09-01 | Stop reason: SDUPTHER

## 2020-07-30 ASSESSMENT — PATIENT HEALTH QUESTIONNAIRE - PHQ9
SUM OF ALL RESPONSES TO PHQ QUESTIONS 1-9: 0
2. FEELING DOWN, DEPRESSED OR HOPELESS: 0
SUM OF ALL RESPONSES TO PHQ9 QUESTIONS 1 & 2: 0
SUM OF ALL RESPONSES TO PHQ QUESTIONS 1-9: 0
1. LITTLE INTEREST OR PLEASURE IN DOING THINGS: 0

## 2020-07-30 NOTE — PROGRESS NOTES
Chief Complaint   Patient presents with    Pre-op Exam     surgery clearence        HPI:  Billie Louis is a 52 y.o. male     To have surgery 8/12/2020    Dr. Wilbert Mann    Lumbar laminectomy and fusion    Had PAT this morning    Aside from his chronic low back pain he is feeling well          Patient Active Problem List   Diagnosis    Hypertension    Chest pressure    Chronic alcoholic hepatitis    History of alcohol use    Chronic cholecystitis with calculus       Current Outpatient Medications   Medication Sig Dispense Refill    gabapentin (NEURONTIN) 300 MG capsule take 1 capsule by mouth three times a day      Multiple Vitamin (MULTI VITAMIN PO) Take by mouth      PROAIR  (90 Base) MCG/ACT inhaler INHALE 2 PUFFS BY MOUTH 4 TIMES DAILY AS NEEDED FOR SHORTNESS OF BREATH.  0     No current facility-administered medications for this visit.           Past Medical History:   Diagnosis Date    Chest pressure 3/8/2016    Cholelithiasis     Chronic alcoholic hepatitis     History of alcohol use     Hypertension 3/8/2016     Past Surgical History:   Procedure Laterality Date    CYST REMOVAL      WA LAP,CHOLECYSTECTOMY/GRAPH N/A 2/19/2018    LAPAROSCOPIC CHOLECYSTECTOMY WITH GRAMS performed by Noreen Kennedy MD at [address] [place] [address]      Deviated Septum    TONSILLECTOMY       Family History   Problem Relation Age of Onset    Alcohol Abuse Father     Diabetes Brother     Heart Attack Paternal Grandfather      Social History     Socioeconomic History    Marital status:      Spouse name: None    Number of children: None    Years of education: None    Highest education level: None   Occupational History    None   Social Needs    Financial resource strain: None    Food insecurity     Worry: None     Inability: None    Transportation needs     Medical: None     Non-medical: None   Tobacco Use    Smoking status: Current Every Day Smoker     Packs/day: 0.50     Types: Cigarettes    Smokeless tobacco: Never Used   Substance and Sexual Activity    Alcohol use: No    Drug use: No    Sexual activity: None   Lifestyle    Physical activity     Days per week: None     Minutes per session: None    Stress: None   Relationships    Social connections     Talks on phone: None     Gets together: None     Attends Zoroastrian service: None     Active member of club or organization: None     Attends meetings of clubs or organizations: None     Relationship status: None    Intimate partner violence     Fear of current or ex partner: None     Emotionally abused: None     Physically abused: None     Forced sexual activity: None   Other Topics Concern    None   Social History Narrative    None     Allergies   Allergen Reactions    Tylenol [Acetaminophen]      Liver issues       Review of Systems:   General ROS: negative for - chills, fatigue, fever, malaise, weight gain or weight loss  Respiratory ROS: no cough, shortness of breath, or wheezing  Cardiovascular ROS: no chest pain or dyspnea on exertion  Gastrointestinal ROS: no abdominal pain, change in bowel habits, or black or bloody stools  Genito-Urinary ROS: no dysuria, trouble voiding  Musculoskeletal ROS: chronic back pain  Neurological ROS: right leg numbness on occasion    In general patient otherwise reports feeling well. Physical Exam:  /80 (Site: Left Upper Arm)   Pulse 87   Temp 98.6 °F (37 °C)   Ht 6' (1.829 m)   Wt 190 lb 9.6 oz (86.5 kg)   SpO2 97%   BMI 25.85 kg/m²     Gen: Well, NAD, Alert, Oriented x 3   HEENT: EOMI, eyes clear, MMM  Skin: without rash or jaundice  Neck: no significant lymphadenopathy or thyromegaly  Lungs: CTA B w/out Rales/Wheezes/Rhonchi, Good respiratory effort   Heart: RRR, S1S2, w/out M/R/G, non-displaced PMI   Ext: No C/C/E Bilaterally. Neuro: Neurovascularly intact w/ Sensory/Motor intact UE/LE Bilaterally.         Lab Results   Component Value Date    WBC 5.5 07/30/2020    HGB 13.9 07/30/2020

## 2020-08-18 ENCOUNTER — VIRTUAL VISIT (OUTPATIENT)
Dept: FAMILY MEDICINE CLINIC | Age: 50
End: 2020-08-18
Payer: COMMERCIAL

## 2020-08-18 PROCEDURE — 99214 OFFICE O/P EST MOD 30 MIN: CPT | Performed by: NURSE PRACTITIONER

## 2020-08-18 PROCEDURE — G8427 DOCREV CUR MEDS BY ELIG CLIN: HCPCS | Performed by: NURSE PRACTITIONER

## 2020-08-18 RX ORDER — ACETAMINOPHEN 500 MG
500 TABLET ORAL EVERY 8 HOURS
COMMUNITY
End: 2021-02-11

## 2020-08-18 RX ORDER — IRON POLYSACCHARIDE COMPLEX 150 MG
150 CAPSULE ORAL DAILY
COMMUNITY
End: 2020-09-01

## 2020-08-18 ASSESSMENT — ENCOUNTER SYMPTOMS
PHOTOPHOBIA: 0
SHORTNESS OF BREATH: 0
COUGH: 0
CHEST TIGHTNESS: 0
COLOR CHANGE: 0
BACK PAIN: 0

## 2020-08-18 NOTE — PROGRESS NOTES
2020    TELEHEALTH EVALUATION -- Audio/Visual (During QENZK-34 public health emergency)    Due to [place] 19 outbreak, patient's office visit was converted to a virtual visit. Patient was contacted and agreed to proceed with a virtual visit via [facility]. me  The risks and benefits of converting to a virtual visit were discussed in light of the current infectious disease epidemic. Patient also understood that insurance coverage and co-pays are up to their individual insurance plans. HPI:    Jacquelincarloz Velarderi (:  1970) has requested an audio/video evaluation for the following concern(s):    Chief Complaint   Patient presents with    Follow-Up from Hospital     lumbar surgery, states that he is in a lot of pain. states that doc at hospital told him that he needs to have platelets checked d/t bruising. HPI    F/u from surgery. Had lumbar laminectomy on 20 at [place] [address]. Stayed at hospital for 4 nights and then discharged home. Was discharged home and advised to take tylenol for pain. Has h/o chronic alcoholic hepatitis. Was doing well, but started drinking again. Stopped 1 week ago. Liver enzymes have started going back up again. Reports his incision in his back is bruising significantly, f/u with the PA with his surgeon who stated this bruising was more significant than it should be for the surgery he had. Pt also reports he bruises easily from a small bump. Most recent platelets were at [address]. Pt was also found to be anemic so was started on iron supplement by the PA at the hospital.     Review of Systems   Constitutional: Negative for chills, diaphoresis, fatigue and fever. HENT: Negative for congestion and ear pain. Eyes: Negative for photophobia and visual disturbance. Respiratory: Negative for cough, chest tightness and shortness of breath. Cardiovascular: Negative for chest pain, palpitations and leg swelling.    Endocrine: Negative for polydipsia, polyphagia and

## 2020-08-19 DIAGNOSIS — D69.6 THROMBOCYTOPENIA (HCC): ICD-10-CM

## 2020-08-19 DIAGNOSIS — K70.10 CHRONIC ALCOHOLIC HEPATITIS: ICD-10-CM

## 2020-08-19 LAB
ALBUMIN SERPL-MCNC: 3.8 G/DL (ref 3.5–4.6)
ALP BLD-CCNC: 109 U/L (ref 35–104)
ALT SERPL-CCNC: 35 U/L (ref 0–41)
ANION GAP SERPL CALCULATED.3IONS-SCNC: 12 MEQ/L (ref 9–15)
AST SERPL-CCNC: 65 U/L (ref 0–40)
BASOPHILS ABSOLUTE: 0 K/UL (ref 0–0.2)
BASOPHILS RELATIVE PERCENT: 0.7 %
BILIRUB SERPL-MCNC: 1.8 MG/DL (ref 0.2–0.7)
BUN BLDV-MCNC: 16 MG/DL (ref 6–20)
CALCIUM SERPL-MCNC: 9.8 MG/DL (ref 8.5–9.9)
CHLORIDE BLD-SCNC: 101 MEQ/L (ref 95–107)
CO2: 24 MEQ/L (ref 20–31)
CREAT SERPL-MCNC: 0.42 MG/DL (ref 0.7–1.2)
EOSINOPHILS ABSOLUTE: 0.1 K/UL (ref 0–0.7)
EOSINOPHILS RELATIVE PERCENT: 1.8 %
GFR AFRICAN AMERICAN: >60
GFR NON-AFRICAN AMERICAN: >60
GLOBULIN: 3.5 G/DL (ref 2.3–3.5)
GLUCOSE BLD-MCNC: 85 MG/DL (ref 70–99)
HCT VFR BLD CALC: 30.9 % (ref 42–52)
HEMOGLOBIN: 10.6 G/DL (ref 14–18)
LYMPHOCYTES ABSOLUTE: 1.1 K/UL (ref 1–4.8)
LYMPHOCYTES RELATIVE PERCENT: 16.6 %
MCH RBC QN AUTO: 35.6 PG (ref 27–31.3)
MCHC RBC AUTO-ENTMCNC: 34.4 % (ref 33–37)
MCV RBC AUTO: 103.7 FL (ref 80–100)
MONOCYTES ABSOLUTE: 1.1 K/UL (ref 0.2–0.8)
MONOCYTES RELATIVE PERCENT: 15.9 %
NEUTROPHILS ABSOLUTE: 4.5 K/UL (ref 1.4–6.5)
NEUTROPHILS RELATIVE PERCENT: 65 %
PDW BLD-RTO: 13.4 % (ref 11.5–14.5)
PLATELET # BLD: 284 K/UL (ref 130–400)
POTASSIUM SERPL-SCNC: 3.6 MEQ/L (ref 3.4–4.9)
RBC # BLD: 2.98 M/UL (ref 4.7–6.1)
SODIUM BLD-SCNC: 137 MEQ/L (ref 135–144)
TOTAL PROTEIN: 7.3 G/DL (ref 6.3–8)
WBC # BLD: 6.8 K/UL (ref 4.8–10.8)

## 2020-08-24 ENCOUNTER — VIRTUAL VISIT (OUTPATIENT)
Dept: GASTROENTEROLOGY | Age: 50
End: 2020-08-24
Payer: COMMERCIAL

## 2020-08-24 PROCEDURE — 99442 PR PHYS/QHP TELEPHONE EVALUATION 11-20 MIN: CPT | Performed by: INTERNAL MEDICINE

## 2020-08-24 RX ORDER — SODIUM, POTASSIUM,MAG SULFATES 17.5-3.13G
SOLUTION, RECONSTITUTED, ORAL ORAL
Qty: 1 BOTTLE | Refills: 0 | Status: SHIPPED | OUTPATIENT
Start: 2020-08-24 | End: 2020-09-01

## 2020-08-24 RX ORDER — NAPROXEN 500 MG/1
TABLET ORAL
COMMUNITY
Start: 2020-08-17 | End: 2020-09-01 | Stop reason: SDUPTHER

## 2020-08-24 NOTE — PROGRESS NOTES
2020    TELEHEALTH EVALUATION -- Audio/Visual (During CEDUA-56 public health emergency)    Due to [place] 19 outbreak, patient's office visit was converted to a virtual visit. Patient was contacted and agreed to proceed with a virtual visit via Telephone Visit  The risks and benefits of converting to a virtual visit were discussed in light of the current infectious disease epidemic. Patient also understood that insurance coverage and co-pays are up to their individual insurance plans. Chief Complaint   Patient presents with    Consultation     TELEPHONE.  Elevated Hepatic Enzymes     is coming down now. HPI:    Cori Wu (:  1970) has requested an audio/video evaluation for the following concern(s): This is very pleasant 70-year-old was referred to us for further evaluation management of abnormal liver enzymes. Patient mentioned that she was at the time of surgery was found to have abnormal liver enzymes. He does report that 2 years ago he had a significant elevation and was told that he may have alcoholic hepatitis. Patient mentioned that he cut down alcohol significantly over the last year or so. Patient denies any viral hepatitis. Denies history of jaundice, easy bruising or admission related to liver condition. Patient mentioned that he was drinking heavily ( could not quantify however he significantly cut down and stop drinking at this time. No prior admission related to liver condition. Patient came in today for the initial visit to discuss abnormal liver enzymes. No prior colonoscopy. Patient mentioned that also been on iron supplement as he had surgery at [facility] and was found to have iron deficiency. Previous GI work up/Endoscopic investigations:     Review of Systems    Prior to Visit Medications    Medication Sig Taking?  Authorizing Provider   naproxen (NAPROSYN) 500 MG tablet take 1 tablet by mouth twice a day with meals Yes Historical Provider, MD Disease Neg Hx    ,   Immunization History   Administered Date(s) Administered    Pneumococcal Polysaccharide ([id number]) 01/30/2018   ,   Health Maintenance   Topic Date Due    Hepatitis A vaccine (1 of 2 - Risk 2-dose series) 09/28/1971    HIV screen  09/28/1985    Hepatitis B vaccine (1 of 3 - Risk 3-dose series) 09/28/1989    DTaP/Tdap/Td vaccine (1 - Tdap) 09/28/1989    Flu vaccine (1) 09/01/2020    Lipid screen  01/08/2024    Pneumococcal 0-64 years Vaccine  Completed    Hib vaccine  Aged Out    Meningococcal (ACWY) vaccine  Aged Out       PHYSICAL EXAMINATION:  [ INSTRUCTIONS:  \"[x]\" Indicates a positive item  \"[]\" Indicates a negative item  -- DELETE ALL ITEMS NOT EXAMINED]  [x] Alert  [x] Oriented to person/place/time    [x] No apparent distress  [] Toxic appearing    [] Face flushed appearing [] Sclera clear  [] Lips are cyanotic      [] Breathing appears normal  [] Appears tachypneic      [] Rash on visible skin    [] Cranial Nerves II-XII grossly intact    [] Motor grossly intact in visible upper extremities    [] Motor grossly intact in visible lower extremities    [] Normal Mood  [] Anxious appearing    [] Depressed appearing  [] Confused appearing      [] Poor short term memory  [] Poor long term memory    [] OTHER:      Due to this being a TeleHealth encounter, evaluation of the following organ systems is limited: Vitals/Constitutional/EENT/Resp/CV/GI//MS/Neuro/Skin/Heme-Lymph-Imm. ASSESSMENT/PLAN:    1. Abnormal liver enzymes  Patient reported that he had a significant elevation almost 2 years ago and was told that he may have alcoholic hepatitis. Patient is currently abstinent from alcohol.   Pursue with etiology work up to assess for any associated viral, autoimmune or Other metabolic etiologies   2- Chronic liver disease staging:  No clinical or lab data suggestive of advanced liver disease   Obtain liver ultrasound Fibroscan for fibrosis staging and assessment  3-Iron deficiency anemia   Patient is status post laminectomy. He reported that while at [facility], he had blood work and shows iron deficiency anemia and currently has been on iron supplements. Denies blood in the stool. No change of bowel movement, diarrhea or constipation. We will proceed with colonoscopy for further evaluation. Timing to follow given the recent back surgery  The patient was counseled at length about the risks of christie Covid-19 during their perioperative period and any recovery window from their procedure. The patient was made aware that christie Covid-19  may worsen their prognosis for recovering from their procedure  and lend to a higher morbidity and/or mortality risk. All material risks, benefits, and reasonable alternatives including postponing the procedure were discussed. The patient does wish to proceed with the procedure at this time. Spent 12 minutes during this telephone visit    Follow up in 6 weeks for further  Management    an electronic signature was used to authenticate this note. --Laquetta Prader, MD on 8/24/2020 at 3:44 PM  Gastroenterology  [address] to the emergency declaration under the [facility], 1135 waiver authority and the [employer] Act, this Virtual Visit was conducted, with patient's consent, to reduce the patient's risk of exposure to COVID-19 and provide continuity of care for an established patient. Services were provided through a video synchronous discussion virtually to substitute for in-person clinic visit. Please note this report has been partially produced using speech recognition software and may cause contain errors related to thatsystem including grammar, punctuation and spelling as well as words and phrases that may seem inappropriate. If there are questions or concerns please feel free to contact me to clarify.

## 2020-09-01 ENCOUNTER — VIRTUAL VISIT (OUTPATIENT)
Dept: FAMILY MEDICINE CLINIC | Age: 50
End: 2020-09-01
Payer: COMMERCIAL

## 2020-09-01 PROCEDURE — 99213 OFFICE O/P EST LOW 20 MIN: CPT | Performed by: FAMILY MEDICINE

## 2020-09-01 PROCEDURE — G8417 CALC BMI ABV UP PARAM F/U: HCPCS | Performed by: FAMILY MEDICINE

## 2020-09-01 PROCEDURE — G8427 DOCREV CUR MEDS BY ELIG CLIN: HCPCS | Performed by: FAMILY MEDICINE

## 2020-09-01 PROCEDURE — 4004F PT TOBACCO SCREEN RCVD TLK: CPT | Performed by: FAMILY MEDICINE

## 2020-09-01 RX ORDER — GABAPENTIN 300 MG/1
600 CAPSULE ORAL NIGHTLY
Qty: 60 CAPSULE | Refills: 2 | Status: SHIPPED | OUTPATIENT
Start: 2020-09-01 | End: 2021-02-11

## 2020-09-01 RX ORDER — NAPROXEN 500 MG/1
TABLET ORAL
Qty: 60 TABLET | Refills: 3 | Status: SHIPPED | OUTPATIENT
Start: 2020-09-01 | End: 2021-02-11

## 2020-09-01 NOTE — PROGRESS NOTES
2020    TELEHEALTH EVALUATION -- Audio/Visual (During LVNWW-72 public health emergency)    Due to COVID 19 outbreak, patient's office visit was converted to a virtual visit. Patient was contacted and agreed to proceed with a virtual visit via Doxy. me  The risks and benefits of converting to a virtual visit were discussed in light of the current infectious disease epidemic. Patient also understood that insurance coverage and co-pays are up to their individual insurance plans. HPI:    Doretha Lomeli (:  1970) has requested an audio/video evaluation for the following concern(s):  Chief Complaint   Patient presents with    Results     go over blood work       Follow up  Had recent back surgery  Was seeing Kateagustin Amarodoug  He is extended family member and she preferred he follow with me ongoing    Had telephone appt with Dr. Angel Mcclure  He will have fibroscan and several other blood tests done     Recovering well from surgery    Patient Active Problem List   Diagnosis    Hypertension    Chest pressure    Chronic alcoholic hepatitis    History of alcohol use    Chronic cholecystitis with calculus     Past Medical History:   Diagnosis Date    Chest pressure 3/8/2016    Cholelithiasis     Chronic alcoholic hepatitis     History of alcohol use     Hypertension 3/8/2016         Review of Systems  Otherwise physically feels healthy without sob/palpitations/chest pain/f/c/n/v/weight gain/weight loss/abd pain      Prior to Visit Medications    Medication Sig Taking? Authorizing Provider   gabapentin (NEURONTIN) 300 MG capsule Take 2 capsules by mouth nightly for 90 days.  Yes Nilton Jay MD   naproxen (NAPROSYN) 500 MG tablet take 1 tablet by mouth twice a day with meals Yes Nilton Jay MD   MAGNESIUM PO Take 665 mg by mouth  Yes Historical Provider, MD   acetaminophen (TYLENOL) 500 MG tablet Take 500 mg by mouth every 8 hours Yes Historical Provider, MD   Multiple Vitamin (MULTI VITAMIN PO) Take by mouth Yes Historical Provider, MD HOUGH  (90 Base) MCG/ACT inhaler INHALE 2 PUFFS BY MOUTH 4 TIMES DAILY AS NEEDED FOR SHORTNESS OF BREATH. Yes Historical Provider, MD       Social History     Tobacco Use    Smoking status: Current Every Day Smoker     Packs/day: 0.50     Types: Cigarettes    Smokeless tobacco: Never Used   Substance Use Topics    Alcohol use: No    Drug use: No          PHYSICAL EXAMINATION:  Patient-Reported Vitals 9/1/2020   Patient-Reported Weight 180 lb   Patient-Reported Height 6'           [ INSTRUCTIONS:  \"[x]\" Indicates a positive item  \"[]\" Indicates a negative item  -- DELETE ALL ITEMS NOT EXAMINED]  [x] Alert  [x] Oriented to person/place/time    [x] No apparent distress  [] Toxic appearing    [] Face flushed appearing [x] Sclera clear  [] Lips are cyanotic      [x] Breathing appears normal  [] Appears tachypneic      [] Rash on visible skin    [x] Cranial Nerves II-XII grossly intact    [x] Motor grossly intact in visible upper extremities    [x] Motor grossly intact in visible lower extremities    [x] Normal Mood  [] Anxious appearing    [] Depressed appearing  [] Confused appearing      [] Poor short term memory  [] Poor long term memory    [] OTHER:      Due to this being a TeleHealth encounter, evaluation of the following organ systems is limited: Vitals/Constitutional/EENT/Resp/CV/GI//MS/Neuro/Skin/Heme-Lymph-Imm. Lab Results   Component Value Date    WBC 6.8 08/19/2020    HGB 10.6 (L) 08/19/2020    HCT 30.9 (L) 08/19/2020     08/19/2020    CHOL 245 (H) 01/08/2019    TRIG 159 01/08/2019    HDL 60 (H) 01/08/2019    ALT 35 08/19/2020    AST 65 (H) 08/19/2020     08/19/2020    K 3.6 08/19/2020     08/19/2020    CREATININE 0.42 (L) 08/19/2020    BUN 16 08/19/2020    CO2 24 08/19/2020    TSH 1.260 07/01/2016    INR 1.3 (H) 07/30/2020    LABA1C 5.1 01/30/2018         ASSESSMENT/PLAN:     Diagnosis Orders   1. Essential hypertension     2.  Chronic alcoholic hepatitis     3. Thrombocytopenia ([facility] [address]     4. Lumbar radiculopathy  gabapentin (NEURONTIN) 300 MG capsule    naproxen (NAPROSYN) 500 MG tablet       Follow up with Dr. Roel Huff scans/bloodwork    Avoid alcohol    We will repeat CBC in a few months    No follow-ups on file. An  electronic signature was used to authenticate this note. --Lorena Trent MD on 9/1/2020 at 2:27 PM        Pursuant to the emergency declaration under the [address] [place] [address], [employer] [facility] waiver authority and the [employer] and [employer] Act, this Virtual  Visit was conducted, with patient's consent, to reduce the patient's risk of exposure to COVID-19 and provide continuity of care for an established patient. Services were provided through a video synchronous discussion virtually to substitute for in-person clinic visit. Patient was at home, Provider was at [facility] office.

## 2020-09-02 ENCOUNTER — ANCILLARY PROCEDURE (OUTPATIENT)
Dept: ENDOSCOPY | Age: 50
End: 2020-09-02
Payer: COMMERCIAL

## 2020-09-02 PROCEDURE — 91200 LIVER ELASTOGRAPHY: CPT

## 2020-09-04 DIAGNOSIS — K76.9 CHRONIC LIVER DISEASE: ICD-10-CM

## 2020-09-04 DIAGNOSIS — D64.9 ANEMIA, UNSPECIFIED TYPE: ICD-10-CM

## 2020-09-04 LAB
FERRITIN: 509.3 NG/ML (ref 30–400)
HEPATITIS B SURFACE ANTIGEN INTERPRETATION: NORMAL
HEPATITIS C ANTIBODY INTERPRETATION: NORMAL
INR BLD: 1.2
IRON SATURATION: 43 % (ref 11–46)
IRON: 109 UG/DL (ref 59–158)
PROTHROMBIN TIME: 14.9 SEC (ref 12.3–14.9)
TOTAL IRON BINDING CAPACITY: 253 UG/DL (ref 178–450)

## 2020-09-07 LAB
HAV AB SERPL IA-ACNC: NEGATIVE
HEPATITIS B CORE TOTAL ANTIBODY: NEGATIVE

## 2020-09-08 LAB
ALPHA-1 ANTITRYPSIN PHENOTYPE: NORMAL
ALPHA-1 ANTITRYPSIN: 176 MG/DL (ref 90–200)
ANTINUCLEAR AB INTERPRETIVE COMMENT: NORMAL
ANTINUCLEAR ANTIBODY, HEP-2, IGG: NORMAL

## 2020-09-10 ENCOUNTER — HOSPITAL ENCOUNTER (OUTPATIENT)
Dept: ULTRASOUND IMAGING | Age: 50
Discharge: HOME | End: 2020-09-12
Payer: COMMERCIAL

## 2020-09-10 PROCEDURE — 76705 ECHO EXAM OF ABDOMEN: CPT

## 2020-10-20 ENCOUNTER — VIRTUAL VISIT (OUTPATIENT)
Dept: GASTROENTEROLOGY | Age: 50
End: 2020-10-20
Payer: COMMERCIAL

## 2020-10-20 PROBLEM — Z12.11 ENCOUNTER FOR SCREENING COLONOSCOPY: Status: ACTIVE | Noted: 2020-10-20

## 2020-10-20 PROBLEM — R79.89 ABNORMAL LFTS: Status: ACTIVE | Noted: 2020-10-20

## 2020-10-20 PROCEDURE — 99213 OFFICE O/P EST LOW 20 MIN: CPT | Performed by: NURSE PRACTITIONER

## 2020-10-20 PROCEDURE — G8417 CALC BMI ABV UP PARAM F/U: HCPCS | Performed by: NURSE PRACTITIONER

## 2020-10-20 PROCEDURE — 3017F COLORECTAL CA SCREEN DOC REV: CPT | Performed by: NURSE PRACTITIONER

## 2020-10-20 PROCEDURE — G8484 FLU IMMUNIZE NO ADMIN: HCPCS | Performed by: NURSE PRACTITIONER

## 2020-10-20 PROCEDURE — G8427 DOCREV CUR MEDS BY ELIG CLIN: HCPCS | Performed by: NURSE PRACTITIONER

## 2020-10-20 PROCEDURE — 4004F PT TOBACCO SCREEN RCVD TLK: CPT | Performed by: NURSE PRACTITIONER

## 2020-10-20 RX ORDER — SODIUM, POTASSIUM,MAG SULFATES 17.5-3.13G
SOLUTION, RECONSTITUTED, ORAL ORAL
Qty: 1 BOTTLE | Refills: 0 | Status: SHIPPED | OUTPATIENT
Start: 2020-10-20 | End: 2021-02-11

## 2020-10-20 ASSESSMENT — ENCOUNTER SYMPTOMS
COLOR CHANGE: 0
VOICE CHANGE: 0
PHOTOPHOBIA: 0
ABDOMINAL DISTENTION: 0
NAUSEA: 0
EYE REDNESS: 0
CHEST TIGHTNESS: 0
DIARRHEA: 0
TROUBLE SWALLOWING: 0
ANAL BLEEDING: 0
RECTAL PAIN: 0
EYE PAIN: 0
VOMITING: 0
BLOOD IN STOOL: 0
CONSTIPATION: 0
ABDOMINAL PAIN: 0

## 2020-10-20 NOTE — PROGRESS NOTES
10/20/2020    TELEHEALTH EVALUATION -- Audio/Visual (During YWQHL-31 public health emergency)    Due to COVID 19 outbreak, patient's office visit was converted to a virtual visit. Patient was contacted and agreed to proceed with a virtual visit via [email or web address]. me  The risks and benefits of converting to a virtual visit were discussed in light of the current infectious disease epidemic. Patient also understood that insurance coverage and co-pays are up to their individual insurance plans. HPI:    Tenisha Pepe (:  1970) has requested an audio/video evaluation for the following concern(s): Patient was previously seen in the office for abnormal liver enzymes reports a history of alcoholic hepatitis, known for greater than 2 years. Patient came in today  as follow-up to FibroScan and to discuss Km Alegre results noted cirrhosis, however noted evidence of preserved synthetic liver function, with recent ultrasound noting hepatic steatosis. Blood work noted and rev'd with pt, no evidence of underlying viral, or autoimmune process. Patient currently drinks alcohol daily, anywhere from 3-5 drinks. Denies any recent liver related hospitalizations, memory loss or confusion, jaundice, easy bruising, pruritus, abdominal swelling, lower extremity edema, nausea vomiting, hematemesis, melena, or hematochezia. Background  This is very pleasant 80-year-old was referred to us for further evaluation management of abnormal liver enzymes. Patient mentioned that she was at the time of surgery was found to have abnormal liver enzymes. He does report that 2 years ago he had a significant elevation and was told that he may have alcoholic hepatitis. Patient mentioned that he cut down alcohol significantly over the last year or so. Patient denies any viral hepatitis. Denies history of jaundice, easy bruising or admission related to liver condition.   Patient mentioned that he was drinking heavily ( could not quantify however he significantly cut down and stop drinking at this time. No prior admission related to liver condition. Patient came in today for the initial visit to discuss abnormal liver enzymes. No prior colonoscopy. Patient mentioned that also been on iron supplement as he had surgery at [facility] and was found to have iron deficiency. Review of Systems   Constitutional: Negative. Negative for chills, fatigue and fever. HENT: Negative for nosebleeds, tinnitus, trouble swallowing and voice change. Eyes: Negative for photophobia, pain and redness. Respiratory: Negative for chest tightness. Cardiovascular: Negative for chest pain, palpitations and leg swelling. Gastrointestinal: Negative for abdominal distention, abdominal pain, anal bleeding, blood in stool, constipation, diarrhea, nausea, rectal pain and vomiting. Endocrine: Negative for polydipsia, polyphagia and polyuria. Genitourinary: Negative for difficulty urinating and hematuria. Skin: Negative for color change, pallor and rash. Neurological: Negative for dizziness, speech difficulty and headaches. Psychiatric/Behavioral: Negative for confusion, sleep disturbance and suicidal ideas. Prior to Visit Medications    Medication Sig Taking? Authorizing Provider   MILK THISTLE PO Take by mouth Yes Historical Provider, MD   Na Sulfate-K Sulfate-Mg Sulf 17.5-3.13-1.6 GM/177ML SOLN As directed Yes MELINDA Gomez - CNP   gabapentin (NEURONTIN) 300 MG capsule Take 2 capsules by mouth nightly for 90 days.  Yes Jorge Alberto Jean MD   Multiple Vitamin (MULTI VITAMIN PO) Take by mouth Yes Historical Provider, MD   naproxen (NAPROSYN) 500 MG tablet take 1 tablet by mouth twice a day with meals  Patient not taking: Reported on 10/20/2020  Jorge Alberto Jean MD   MAGNESIUM PO Take 665 mg by mouth   Historical Provider, MD   acetaminophen (TYLENOL) 500 MG tablet Take 500 mg by mouth every 8 hours  Historical Provider, MD   [facility]  memory    [] OTHER:      Due to this being a TeleHealth encounter, evaluation of the following organ systems is limited: Vitals/Constitutional/EENT/Resp/CV/GI//MS/Neuro/Skin/Heme-Lymph-Imm. ASSESSMENT/PLAN:  1. Alcoholic hepatitis vs ? Cirrhosis  Known history of alcoholic hepatitis for greater than 2 years, currently drinks alcohol daily 3-5 drinks, recent ultrasound noted hepatic steatosis, recent FibroScan noted cirrhosis however ? Results given the evidence of preserved synthetic liver function and current alcohol use. Noted negative work-up for any associated viral, autoimmune or other metabolic etiologies. -Strongly stressed complete alcohol abstinence  2 -no history of ascites ascites:   3- EGD for Variceal surveillance:    No history of EGD,   Requested EGD indications and outcomes discussed at length, all of his questions were answered he agrees to proceed at the next available appointment  -Obtain CBC, CMP and INR 1 week prior to procedure  4 - [facility] [address] screening:   Recent imaging Negative for liver mass  5- no Hepatic encephalopathy  6- Nutrition/ hx of ALBA : continue multivitamin / Nutritional  support , iron supplement stop 1 week prior to colonoscopy  7- Preventive measure:   - vaccinate for Hep A and Hep B, Flu and pneumonia per PCP  -  Avoid medications, supplements, and other substances that potentially  Hepatotoxic.  -Requested screening colonoscopy, indications and outcomes discussed at length, all of his questions were answered, he agrees to proceed at the next available appointment. Not on anticoagulation, no first-degree relative with CRC, the importance of prep adherence discussed at length to ensure adequate exam  The patient was counseled at length about the risks of christie Covid-19 during their perioperative period and any recovery window from their procedure.   The patient was made aware that christie Covid-19  may worsen their prognosis for recovering from their procedure  and lend to a higher morbidity and/or mortality risk. All material risks, benefits, and reasonable alternatives including postponing the procedure were discussed. The patient does wish to proceed with the procedure at this time. Return in about 8 weeks (around 12/15/2020) for post procedure results. An  electronic signature was used to authenticate this note. --Derian Messina, MELINDA - CNP on 10/20/2020 at 9:32 AM        Pursuant to the emergency declaration under the [facility], [facility] waiver authority and the [employer] and [employer] Act, this Virtual  Visit was conducted, with patient's consent, to reduce the patient's risk of exposure to COVID-19 and provide continuity of care for an established patient. Services were provided through a video synchronous discussion virtually to substitute for in-person clinic visit.

## 2020-11-19 PROBLEM — Z12.11 ENCOUNTER FOR SCREENING COLONOSCOPY: Status: RESOLVED | Noted: 2020-10-20 | Resolved: 2020-11-19

## 2021-02-11 ENCOUNTER — OFFICE VISIT (OUTPATIENT)
Dept: FAMILY MEDICINE CLINIC | Age: 51
End: 2021-02-11
Payer: COMMERCIAL

## 2021-02-11 VITALS
DIASTOLIC BLOOD PRESSURE: 72 MMHG | HEIGHT: 72 IN | BODY MASS INDEX: 25.87 KG/M2 | TEMPERATURE: 97.8 F | WEIGHT: 191 LBS | SYSTOLIC BLOOD PRESSURE: 110 MMHG | OXYGEN SATURATION: 98 % | HEART RATE: 93 BPM

## 2021-02-11 DIAGNOSIS — D69.6 THROMBOCYTOPENIA (HCC): ICD-10-CM

## 2021-02-11 DIAGNOSIS — R53.83 FATIGUE, UNSPECIFIED TYPE: ICD-10-CM

## 2021-02-11 DIAGNOSIS — R53.83 FATIGUE, UNSPECIFIED TYPE: Primary | ICD-10-CM

## 2021-02-11 DIAGNOSIS — K70.10 CHRONIC ALCOHOLIC HEPATITIS: ICD-10-CM

## 2021-02-11 DIAGNOSIS — I10 ESSENTIAL HYPERTENSION: ICD-10-CM

## 2021-02-11 LAB
ALBUMIN SERPL-MCNC: 3.8 G/DL (ref 3.5–4.6)
ALP BLD-CCNC: 161 U/L (ref 35–104)
ALT SERPL-CCNC: 47 U/L (ref 0–41)
ANION GAP SERPL CALCULATED.3IONS-SCNC: 11 MEQ/L (ref 9–15)
AST SERPL-CCNC: 210 U/L (ref 0–40)
BILIRUB SERPL-MCNC: 2.2 MG/DL (ref 0.2–0.7)
BUN BLDV-MCNC: 6 MG/DL (ref 6–20)
CALCIUM SERPL-MCNC: 9.2 MG/DL (ref 8.5–9.9)
CHLORIDE BLD-SCNC: 102 MEQ/L (ref 95–107)
CO2: 34 MEQ/L (ref 20–31)
CREAT SERPL-MCNC: 0.35 MG/DL (ref 0.7–1.2)
GFR AFRICAN AMERICAN: >60
GFR NON-AFRICAN AMERICAN: >60
GLOBULIN: 4.3 G/DL (ref 2.3–3.5)
GLUCOSE BLD-MCNC: 143 MG/DL (ref 70–99)
HCT VFR BLD CALC: 41 % (ref 42–52)
HEMOGLOBIN: 13.8 G/DL (ref 14–18)
MCH RBC QN AUTO: 35.4 PG (ref 27–31.3)
MCHC RBC AUTO-ENTMCNC: 33.7 % (ref 33–37)
MCV RBC AUTO: 105 FL (ref 80–100)
PDW BLD-RTO: 12.6 % (ref 11.5–14.5)
PLATELET # BLD: 47 K/UL (ref 130–400)
POTASSIUM SERPL-SCNC: 3 MEQ/L (ref 3.4–4.9)
RBC # BLD: 3.9 M/UL (ref 4.7–6.1)
SODIUM BLD-SCNC: 147 MEQ/L (ref 135–144)
TOTAL PROTEIN: 8.1 G/DL (ref 6.3–8)
TSH SERPL DL<=0.05 MIU/L-ACNC: 0.82 UIU/ML (ref 0.44–3.86)
VITAMIN B-12: 1418 PG/ML (ref 232–1245)
VITAMIN D 25-HYDROXY: 58.1 NG/ML (ref 30–100)
WBC # BLD: 4.2 K/UL (ref 4.8–10.8)

## 2021-02-11 PROCEDURE — G8427 DOCREV CUR MEDS BY ELIG CLIN: HCPCS | Performed by: FAMILY MEDICINE

## 2021-02-11 PROCEDURE — 3017F COLORECTAL CA SCREEN DOC REV: CPT | Performed by: FAMILY MEDICINE

## 2021-02-11 PROCEDURE — G8484 FLU IMMUNIZE NO ADMIN: HCPCS | Performed by: FAMILY MEDICINE

## 2021-02-11 PROCEDURE — 99213 OFFICE O/P EST LOW 20 MIN: CPT | Performed by: FAMILY MEDICINE

## 2021-02-11 PROCEDURE — G8417 CALC BMI ABV UP PARAM F/U: HCPCS | Performed by: FAMILY MEDICINE

## 2021-02-11 PROCEDURE — 4004F PT TOBACCO SCREEN RCVD TLK: CPT | Performed by: FAMILY MEDICINE

## 2021-02-11 RX ORDER — ASCORBIC ACID 1000 MG
TABLET ORAL
COMMUNITY
End: 2021-09-14

## 2021-02-11 SDOH — ECONOMIC STABILITY: INCOME INSECURITY: HOW HARD IS IT FOR YOU TO PAY FOR THE VERY BASICS LIKE FOOD, HOUSING, MEDICAL CARE, AND HEATING?: NOT HARD AT ALL

## 2021-02-11 SDOH — ECONOMIC STABILITY: TRANSPORTATION INSECURITY
IN THE PAST 12 MONTHS, HAS LACK OF TRANSPORTATION KEPT YOU FROM MEETINGS, WORK, OR FROM GETTING THINGS NEEDED FOR DAILY LIVING?: NO

## 2021-02-11 SDOH — ECONOMIC STABILITY: FOOD INSECURITY: WITHIN THE PAST 12 MONTHS, THE FOOD YOU BOUGHT JUST DIDN'T LAST AND YOU DIDN'T HAVE MONEY TO GET MORE.: NEVER TRUE

## 2021-02-11 ASSESSMENT — PATIENT HEALTH QUESTIONNAIRE - PHQ9
SUM OF ALL RESPONSES TO PHQ QUESTIONS 1-9: 0
SUM OF ALL RESPONSES TO PHQ QUESTIONS 1-9: 0

## 2021-02-11 NOTE — PROGRESS NOTES
Chief Complaint   Patient presents with    Other     has been lathargic and not healing well, would like vitamin levels checked       HPI:  Safia Valera is a 48 y.o. male     Here with lethargy and fatigue    He has some chronic liver dysfunction and sees Dr. Brian Grossman          Patient Active Problem List   Diagnosis    Hypertension    Chest pressure    Chronic alcoholic hepatitis    History of alcohol use    Chronic cholecystitis with calculus    Abnormal LFTs       Current Outpatient Medications   Medication Sig Dispense Refill    Ginkgo Biloba 40 MG TABS Take by mouth      Multiple Vitamin (MULTI VITAMIN PO) Take by mouth      PROAIR  (90 Base) MCG/ACT inhaler INHALE 2 PUFFS BY MOUTH 4 TIMES DAILY AS NEEDED FOR SHORTNESS OF BREATH.  0     No current facility-administered medications for this visit.           Past Medical History:   Diagnosis Date    Chest pressure 3/8/2016    Cholelithiasis     Chronic alcoholic hepatitis     History of alcohol use     Hypertension 3/8/2016     Past Surgical History:   Procedure Laterality Date    CYST REMOVAL      HI LAP,CHOLECYSTECTOMY/GRAPH N/A 2/19/2018    LAPAROSCOPIC CHOLECYSTECTOMY WITH GRAMS performed by Rohan Huang MD at [address] [place] [address]      Deviated Septum    TONSILLECTOMY       Family History   Problem Relation Age of Onset    Alcohol Abuse Father     Diabetes Brother     Heart Attack Paternal Grandfather     Crohn's Disease Maternal Aunt     Colon Cancer Neg Hx     Celiac Disease Neg Hx      Social History     Socioeconomic History    Marital status:      Spouse name: None    Number of children: None    Years of education: None    Highest education level: None   Occupational History    None   Social Needs    Financial resource strain: Not hard at all   Tilly-Edilson insecurity     Worry: Never true     Inability: Never true    Transportation needs     Medical: No     Non-medical: No   Tobacco Use    Smoking status: Current Every Day Smoker     Packs/day: 0.50     Types: Cigarettes    Smokeless tobacco: Never Used   Substance and Sexual Activity    Alcohol use: No    Drug use: No    Sexual activity: None   Lifestyle    Physical activity     Days per week: None     Minutes per session: None    Stress: None   Relationships    Social connections     Talks on phone: None     Gets together: None     Attends Congregation service: None     Active member of club or organization: None     Attends meetings of clubs or organizations: None     Relationship status: None    Intimate partner violence     Fear of current or ex partner: None     Emotionally abused: None     Physically abused: None     Forced sexual activity: None   Other Topics Concern    None   Social History Narrative    None     No Known Allergies    Review of Systems:   General ROS: negative for - chills, fatigue, fever, malaise, weight gain or weight loss  Respiratory ROS: no cough, shortness of breath, or wheezing  Cardiovascular ROS: no chest pain or dyspnea on exertion  Gastrointestinal ROS: no abdominal pain, change in bowel habits, or black or bloody stools  Genito-Urinary ROS: no dysuria, trouble voiding  Musculoskeletal ROS: chronic back pain  Neurological ROS: right leg numbness on occasion    In general patient otherwise reports feeling well. Physical Exam:  /72 (Site: Left Upper Arm)   Pulse 93   Temp 97.8 °F (36.6 °C)   Ht 6' (1.829 m)   Wt 191 lb (86.6 kg)   SpO2 98%   BMI 25.90 kg/m²     Gen: Well, NAD, Alert, Oriented x 3   HEENT: EOMI, eyes clear, MMM  Skin: without rash or jaundice  Neck: no significant lymphadenopathy or thyromegaly  Lungs: CTA B w/out Rales/Wheezes/Rhonchi, Good respiratory effort   Heart: RRR, S1S2, w/out M/R/G, non-displaced PMI   Ext: No C/C/E Bilaterally. Neuro: Neurovascularly intact w/ Sensory/Motor intact UE/LE Bilaterally.   Psych: mild \"bummed out\" but denies depression      Lab Results   Component Value Date    WBC 6.8 08/19/2020    HGB 10.6 (L) 08/19/2020    HCT 30.9 (L) 08/19/2020     08/19/2020    CHOL 245 (H) 01/08/2019    TRIG 159 01/08/2019    HDL 60 (H) 01/08/2019    ALT 35 08/19/2020    AST 65 (H) 08/19/2020     08/19/2020    K 3.6 08/19/2020     08/19/2020    CREATININE 0.42 (L) 08/19/2020    BUN 16 08/19/2020    CO2 24 08/19/2020    TSH 1.260 07/01/2016    INR 1.2 09/04/2020    LABA1C 5.1 01/30/2018         A&P   Diagnosis Orders   1. Fatigue, unspecified type  Comprehensive Metabolic Panel    CBC    TSH without Reflex    Vitamin D 25 Hydroxy    Vitamin B12    Testosterone Free And Total Male   2. Chronic alcoholic hepatitis     3. Thrombocytopenia ([address]     4.  Essential hypertension         Lab workup as ordered to search for organic causes of fatigue    Maybe Seasonal affective component      Mariela Sandoval MD

## 2021-02-13 LAB
SEX HORMONE BINDING GLOBULIN: 52 NMOL/L (ref 11–80)
TESTOSTERONE FREE PERCENT: 1.4 % (ref 1.6–2.9)
TESTOSTERONE FREE, CALC: 37 PG/ML (ref 47–244)
TESTOSTERONE TOTAL-MALE: 277 NG/DL (ref 300–890)

## 2021-04-05 ENCOUNTER — NURSE ONLY (OUTPATIENT)
Dept: FAMILY MEDICINE CLINIC | Age: 51
End: 2021-04-05
Payer: COMMERCIAL

## 2021-04-05 DIAGNOSIS — E29.1 HYPOGONADISM IN MALE: Primary | ICD-10-CM

## 2021-04-05 PROCEDURE — 96372 THER/PROPH/DIAG INJ SC/IM: CPT | Performed by: FAMILY MEDICINE

## 2021-04-05 RX ORDER — TESTOSTERONE CYPIONATE 200 MG/ML
200 INJECTION INTRAMUSCULAR ONCE
Status: COMPLETED | OUTPATIENT
Start: 2021-04-05 | End: 2021-04-05

## 2021-04-05 RX ADMIN — TESTOSTERONE CYPIONATE 200 MG: 200 INJECTION INTRAMUSCULAR at 16:03

## 2021-04-05 NOTE — PROGRESS NOTES
Patient given Testosterone 200mg IM right gluteal..  Will return in 2 weeks for next injection    Patient tolerated injection well.     Administrations This Visit     testosterone cypionate (DEPOTESTOTERONE CYPIONATE) injection 200 mg     Admin Date  04/05/2021 Action  Given Dose  200 mg Route  Intramuscular Administered By  Ana Gonzalez LPN

## 2021-04-15 ENCOUNTER — TELEPHONE (OUTPATIENT)
Dept: FAMILY MEDICINE CLINIC | Age: 51
End: 2021-04-15

## 2021-04-28 ENCOUNTER — NURSE ONLY (OUTPATIENT)
Dept: ENDOCRINOLOGY | Age: 51
End: 2021-04-28
Payer: COMMERCIAL

## 2021-04-28 DIAGNOSIS — E29.1 HYPOGONADISM IN MALE: Primary | ICD-10-CM

## 2021-04-28 PROCEDURE — 96372 THER/PROPH/DIAG INJ SC/IM: CPT | Performed by: INTERNAL MEDICINE

## 2021-04-28 RX ORDER — TESTOSTERONE CYPIONATE 200 MG/ML
200 INJECTION INTRAMUSCULAR ONCE
Status: COMPLETED | OUTPATIENT
Start: 2021-04-28 | End: 2021-04-28

## 2021-04-28 RX ADMIN — TESTOSTERONE CYPIONATE 200 MG: 200 INJECTION INTRAMUSCULAR at 14:44

## 2021-04-28 NOTE — PROGRESS NOTES
Patient given Testosterone 200mg IM left gluteal..  Will return in 2 weeks for next injection    Patient tolerated injection well.     Administrations This Visit     testosterone cypionate (DEPOTESTOTERONE CYPIONATE) injection 200 mg     Admin Date  04/28/2021 Action  Given Dose  200 mg Route  Intramuscular Administered By  Jose Alfredo Gaines LPN

## 2021-05-10 ENCOUNTER — NURSE ONLY (OUTPATIENT)
Dept: FAMILY MEDICINE CLINIC | Age: 51
End: 2021-05-10
Payer: COMMERCIAL

## 2021-05-10 DIAGNOSIS — E29.1 HYPOGONADISM IN MALE: Primary | ICD-10-CM

## 2021-05-10 PROCEDURE — 96372 THER/PROPH/DIAG INJ SC/IM: CPT | Performed by: FAMILY MEDICINE

## 2021-05-10 RX ORDER — TESTOSTERONE CYPIONATE 200 MG/ML
200 INJECTION INTRAMUSCULAR ONCE
Status: COMPLETED | OUTPATIENT
Start: 2021-05-10 | End: 2021-05-10

## 2021-05-10 RX ADMIN — TESTOSTERONE CYPIONATE 200 MG: 200 INJECTION INTRAMUSCULAR at 14:05

## 2021-05-24 ENCOUNTER — NURSE ONLY (OUTPATIENT)
Dept: FAMILY MEDICINE CLINIC | Age: 51
End: 2021-05-24
Payer: COMMERCIAL

## 2021-05-24 ENCOUNTER — TELEPHONE (OUTPATIENT)
Dept: FAMILY MEDICINE CLINIC | Age: 51
End: 2021-05-24

## 2021-05-24 DIAGNOSIS — E29.1 HYPOGONADISM IN MALE: Primary | ICD-10-CM

## 2021-05-24 DIAGNOSIS — E29.1 HYPOGONADISM MALE: Primary | ICD-10-CM

## 2021-05-24 PROCEDURE — 96372 THER/PROPH/DIAG INJ SC/IM: CPT | Performed by: FAMILY MEDICINE

## 2021-05-24 RX ORDER — TESTOSTERONE CYPIONATE 200 MG/ML
200 INJECTION INTRAMUSCULAR ONCE
Status: COMPLETED | OUTPATIENT
Start: 2021-05-24 | End: 2021-05-24

## 2021-05-24 RX ADMIN — TESTOSTERONE CYPIONATE 200 MG: 200 INJECTION INTRAMUSCULAR at 14:08

## 2021-05-24 NOTE — PROGRESS NOTES
Patient given Testosterone 200mg IM left gluteal..  Will return in 2 weeks for next injection    Patient tolerated injection well.     Administrations This Visit     testosterone cypionate (DEPOTESTOTERONE CYPIONATE) injection 200 mg     Admin Date  05/24/2021 Action  Given Dose  200 mg Route  Intramuscular Administered By  Ana Gonzalez LPN

## 2021-05-24 NOTE — TELEPHONE ENCOUNTER
Pt is asking how long he is to be getting the testosterone injections. Does he need labs anytime to check his levels. Please advise. Pt phone number is [phone].

## 2021-09-11 ENCOUNTER — OFFICE VISIT (OUTPATIENT)
Dept: FAMILY MEDICINE CLINIC | Age: 51
End: 2021-09-11
Payer: COMMERCIAL

## 2021-09-11 DIAGNOSIS — S91.332A PUNCTURE WOUND OF LEFT FOOT, INITIAL ENCOUNTER: Primary | ICD-10-CM

## 2021-09-11 PROCEDURE — G8428 CUR MEDS NOT DOCUMENT: HCPCS | Performed by: PHYSICIAN ASSISTANT

## 2021-09-11 PROCEDURE — 99213 OFFICE O/P EST LOW 20 MIN: CPT | Performed by: PHYSICIAN ASSISTANT

## 2021-09-11 PROCEDURE — 90715 TDAP VACCINE 7 YRS/> IM: CPT | Performed by: PHYSICIAN ASSISTANT

## 2021-09-11 PROCEDURE — 90471 IMMUNIZATION ADMIN: CPT | Performed by: PHYSICIAN ASSISTANT

## 2021-09-11 PROCEDURE — 3017F COLORECTAL CA SCREEN DOC REV: CPT | Performed by: PHYSICIAN ASSISTANT

## 2021-09-11 PROCEDURE — G8417 CALC BMI ABV UP PARAM F/U: HCPCS | Performed by: PHYSICIAN ASSISTANT

## 2021-09-11 PROCEDURE — 4004F PT TOBACCO SCREEN RCVD TLK: CPT | Performed by: PHYSICIAN ASSISTANT

## 2021-09-11 RX ORDER — CEPHALEXIN 500 MG/1
500 CAPSULE ORAL 2 TIMES DAILY
Qty: 10 CAPSULE | Refills: 0 | Status: SHIPPED | OUTPATIENT
Start: 2021-09-11 | End: 2021-09-16

## 2021-09-11 NOTE — PROGRESS NOTES
[facility] Encounter  CHIEF COMPLAINT       Chief Complaint   Patient presents with    Other     stepped on nail       HISTORY OF PRESENT ILLNESS   Marjan Tim is a 48 y.o. male who presents with:  HPI  Patient is presenting today for CC of stepping on nail today. The incident happen about two hours ago. Patient was moving a part of a broken fence when he stepped on a nail which went through the sole of his tennis shoes and into the back of his left heel. Patient is not sure when he has last tetanus shot. Patient cleaned the area with soap and water. Applied bandaid before coming in. NO fevers, chills, or foreign body expected in the back of the left heel. REVIEW OF SYSTEMS     Review of Systems   Constitutional: Negative for activity change, appetite change, chills and fever. HENT: Negative for congestion, drooling, sinus pressure, sinus pain and sore throat. Eyes: Negative for visual disturbance. Respiratory: Negative for cough, chest tightness and shortness of breath. Cardiovascular: Negative for chest pain. Gastrointestinal: Negative for abdominal pain, diarrhea, nausea and vomiting. Endocrine: Negative for cold intolerance. Genitourinary: Negative for dysuria, flank pain, frequency and hematuria. Musculoskeletal: Negative for arthralgias and back pain. Skin: Positive for wound. Negative for rash. Allergic/Immunologic: Negative for food allergies. Neurological: Negative for weakness, light-headedness, numbness and headaches. Hematological: Does not bruise/bleed easily. PAST MEDICAL HISTORY         Diagnosis Date    Chest pressure 3/8/2016    Cholelithiasis     Chronic alcoholic hepatitis     History of alcohol use     Hypertension 3/8/2016     SURGICAL HISTORY     Patient  has a past surgical history that includes Tonsillectomy; cyst removal; rhinoplasty; and pr lap,cholecystectomy/graph (N/A, 2/19/2018).   CURRENT MEDICATIONS Previous Medications    GINKGO BILOBA 40 MG TABS    Take by mouth    MULTIPLE VITAMIN (MULTI VITAMIN PO)    Take by mouth    PROAIR  (90 BASE) MCG/ACT INHALER    INHALE 2 PUFFS BY MOUTH 4 TIMES DAILY AS NEEDED FOR SHORTNESS OF BREATH. ALLERGIES     Patient is has No Known Allergies. FAMILY HISTORY     Patient'sfamily history includes Alcohol Abuse in his father; Crohn's Disease in his maternal aunt; Diabetes in his brother; Heart Attack in his paternal grandfather. SOCIAL HISTORY     Patient  reports that he has been smoking cigarettes. He has been smoking about 0.50 packs per day. He has never used smokeless tobacco. He reports that he does not drink alcohol and does not use drugs. PHYSICAL EXAM     VITALS   ,  ,  ,  ,    Physical Exam  Vitals and nursing note reviewed. Constitutional:       General: He is awake. He is not in acute distress. Appearance: Normal appearance. He is well-developed. He is not ill-appearing, toxic-appearing or diaphoretic. HENT:      Head: Normocephalic and atraumatic. Right Ear: Hearing and external ear normal.      Left Ear: Hearing and external ear normal.      Nose: Nose normal.   Eyes:      General: Lids are normal. Vision grossly intact. Gaze aligned appropriately. Conjunctiva/sclera: Conjunctivae normal.   Cardiovascular:      Rate and Rhythm: Normal rate and regular rhythm. Pulses: Normal pulses. Heart sounds: Normal heart sounds, S1 normal and S2 normal.   Pulmonary:      Effort: Pulmonary effort is normal.      Breath sounds: Normal breath sounds and air entry. Musculoskeletal:      Cervical back: Normal range of motion. Feet:    Feet:      Comments: Puncture wound on the left lateral aspect of the heel. Circular in shape Mostly superficial. There was slight bleeding when removing the bandage. No sign of foreign body in the left. Patient reports nail came out intact. Neurovascular intact. Skin:     General: Skin is warm. Capillary Refill: Capillary refill takes less than 2 seconds. Neurological:      Mental Status: He is alert and oriented to person, place, and time. Gait: Gait is intact. Psychiatric:         Attention and Perception: Attention normal.         Mood and Affect: Mood normal.         Speech: Speech normal.         Behavior: Behavior normal. Behavior is cooperative. READY CARE COURSE   Labs:  No results found for this visit on 09/11/21. IMAGING:  No orders to display     Scheduled Meds:  Continuous Infusions:  PRN Meds:. PROCEDURES:  FINAL IMPRESSION      1. Puncture wound of left foot, initial encounter      DISPOSITION/PLAN   1. Recommend tetanus shot and abx at this time. Went over possible s/e of the medications. Patient would like to proceed with therapy. Discussed signs and symptoms which require immediate follow-up in ED/call to 911. Patient verbalized understanding. On this date 9/11/2021 I have spent 20 minutes reviewing previous notes, test results and face to face with the patient discussing the diagnosis and importance of compliance with the treatment plan as well as documenting on the day of the visit. PATIENT REFERRED TO:  Return if symptoms worsen or fail to improve. DISCHARGE MEDICATIONS:  New Prescriptions    CEPHALEXIN (KEFLEX) 500 MG CAPSULE    Take 1 capsule by mouth 2 times daily for 5 days     Cannot display discharge medications since this is not an admission.        Jd Hassell, [place]

## 2021-09-13 ASSESSMENT — ENCOUNTER SYMPTOMS
SINUS PAIN: 0
VOMITING: 0
NAUSEA: 0
SINUS PRESSURE: 0
ABDOMINAL PAIN: 0
SHORTNESS OF BREATH: 0
CHEST TIGHTNESS: 0
COUGH: 0
SORE THROAT: 0
DIARRHEA: 0
BACK PAIN: 0

## 2021-09-13 ASSESSMENT — VISUAL ACUITY: OU: 1

## 2021-09-14 ENCOUNTER — OFFICE VISIT (OUTPATIENT)
Dept: FAMILY MEDICINE CLINIC | Age: 51
End: 2021-09-14
Payer: COMMERCIAL

## 2021-09-14 VITALS
DIASTOLIC BLOOD PRESSURE: 72 MMHG | TEMPERATURE: 97.5 F | WEIGHT: 188.6 LBS | SYSTOLIC BLOOD PRESSURE: 122 MMHG | BODY MASS INDEX: 25.55 KG/M2 | HEART RATE: 86 BPM | OXYGEN SATURATION: 99 % | HEIGHT: 72 IN

## 2021-09-14 DIAGNOSIS — E29.1 HYPOGONADISM MALE: ICD-10-CM

## 2021-09-14 DIAGNOSIS — Z11.4 SCREENING FOR HIV (HUMAN IMMUNODEFICIENCY VIRUS): ICD-10-CM

## 2021-09-14 DIAGNOSIS — E87.6 HYPOKALEMIA: ICD-10-CM

## 2021-09-14 DIAGNOSIS — K70.10 CHRONIC ALCOHOLIC HEPATITIS: ICD-10-CM

## 2021-09-14 DIAGNOSIS — Z12.11 SCREEN FOR COLON CANCER: ICD-10-CM

## 2021-09-14 DIAGNOSIS — I10 ESSENTIAL HYPERTENSION: ICD-10-CM

## 2021-09-14 DIAGNOSIS — Z12.5 SCREENING PSA (PROSTATE SPECIFIC ANTIGEN): ICD-10-CM

## 2021-09-14 DIAGNOSIS — K59.00 CONSTIPATION, UNSPECIFIED CONSTIPATION TYPE: Primary | ICD-10-CM

## 2021-09-14 PROCEDURE — 99214 OFFICE O/P EST MOD 30 MIN: CPT | Performed by: FAMILY MEDICINE

## 2021-09-14 PROCEDURE — 3017F COLORECTAL CA SCREEN DOC REV: CPT | Performed by: FAMILY MEDICINE

## 2021-09-14 PROCEDURE — G8427 DOCREV CUR MEDS BY ELIG CLIN: HCPCS | Performed by: FAMILY MEDICINE

## 2021-09-14 PROCEDURE — 4004F PT TOBACCO SCREEN RCVD TLK: CPT | Performed by: FAMILY MEDICINE

## 2021-09-14 PROCEDURE — G8417 CALC BMI ABV UP PARAM F/U: HCPCS | Performed by: FAMILY MEDICINE

## 2021-09-14 NOTE — PROGRESS NOTES
Chief Complaint   Patient presents with    Annual Ex[facility]     [facility]       HPI:  Noé Haddad is a 48 y.o. male     He has chronic liver dysfunction and sees Dr. Arun Currie    Having constipation  Has to use laxative  Dulcolax   Wondering if addicted to laxatives    Due for labs to monitor chronic issues    No longer drinking      Patient Active Problem List   Diagnosis    Hypertension    Chest pressure    Chronic alcoholic hepatitis    History of alcohol use    Chronic cholecystitis with calculus    Abnormal LFTs       Current Outpatient Medications   Medication Sig Dispense Refill    linaclotide (LINZESS) 145 MCG capsule Take 1 capsule by mouth every morning (before breakfast) 30 capsule 5    cephALEXin (KEFLEX) 500 MG capsule Take 1 capsule by mouth 2 times daily for 5 days 10 capsule 0    Multiple Vitamin (MULTI VITAMIN PO) Take by mouth      PROAIR  (90 Base) MCG/ACT inhaler INHALE 2 PUFFS BY MOUTH 4 TIMES DAILY AS NEEDED FOR SHORTNESS OF BREATH.  0     No current facility-administered medications for this visit.          Past Medical History:   Diagnosis Date    Chest pressure 3/8/2016    Cholelithiasis     Chronic alcoholic hepatitis     History of alcohol use     Hypertension 3/8/2016     Past Surgical History:   Procedure Laterality Date    CYST REMOVAL      [place] LAP,CHOLECYSTECTOMY/GRAPH N/A 2/19/2018    LAPAROSCOPIC CHOLECYSTECTOMY WITH GRAMS performed by Juan Ahmadi MD at [address] [place] [address]      Deviated Septum    TONSILLECTOMY       Family History   Problem Relation Age of Onset    Alcohol Abuse Father     Diabetes Brother     Heart Attack Paternal Grandfather     Crohn's Disease Maternal Aunt     Colon Cancer Neg Hx     Celiac Disease Neg Hx      Social History     Socioeconomic History    Marital status:      Spouse name: None    Number of children: None    Years of education: None    Highest education level: None   Occupational History    None   Tobacco Use    Smoking status: Current Every Day Smoker     Packs/day: 0.50     Types: Cigarettes    Smokeless tobacco: Never Used   Substance and Sexual Activity    Alcohol use: No    Drug use: No    Sexual activity: None   Other Topics Concern    None   Social History Narrative    None     Social Determinants of Health     Financial Resource Strain: Low Risk     Difficulty of Paying Living Expenses: Not hard at all   Food Insecurity: No Food Insecurity    Worried About Running Out of Food in the Last Year: Never true    Oswaldo of Food in the Last Year: Never true   Transportation Needs: No Transportation Needs    Lack of Transportation (Medical): No    Lack of Transportation (Non-Medical): No   Physical Activity:     Days of Exercise per Week:     Minutes of Exercise per Session:    Stress:     Feeling of Stress :    Social Connections:     Frequency of Communication with Friends and Family:     Frequency of Social Gatherings with Friends and Family:     Attends Christianity Services:     Active Member of Clubs or Organizations:     Attends Club or Organization Meetings:     Marital Status:    Intimate Partner Violence:     Fear of Current or Ex-Partner:     Emotionally Abused:     Physically Abused:     Sexually Abused: Allergies   Allergen Reactions    Acetaminophen Other (See Comments)     Liver issues  intolerance due to Liver       Review of Systems:   General ROS: negative for - chills, fatigue, fever, malaise, weight gain or weight loss  Respiratory ROS: no cough, shortness of breath, or wheezing  Cardiovascular ROS: no chest pain or dyspnea on exertion  Gastrointestinal ROS: no abdominal pain, change in bowel habits, or black or bloody stools  Genito-Urinary ROS: no dysuria, trouble voiding  Musculoskeletal ROS: chronic back pain  Neurological ROS: right leg numbness on occasion    In general patient otherwise reports feeling well.      Physical Exam:  /72 (Site: Right Upper Arm) Pulse 86   Temp 97.5 °F (36.4 °C)   Ht 6' (1.829 m)   Wt 188 lb 9.6 oz (85.5 kg)   SpO2 99%   BMI 25.58 kg/m²     Gen: Well, NAD, Alert, Oriented x 3   HEENT: EOMI, eyes clear, MMM  Skin: without rash or jaundice  Neck: no significant lymphadenopathy or thyromegaly  Lungs: CTA B w/out Rales/Wheezes/Rhonchi, Good respiratory effort   Heart: RRR, S1S2, w/out M/R/G, non-displaced PMI   Ext: No C/C/E Bilaterally. Neuro: Neurovascularly intact w/ Sensory/Motor intact UE/LE Bilaterally. Psych: euthymic      Lab Results   Component Value Date    WBC 4.2 (L) 02/11/2021    HGB 13.8 (L) 02/11/2021    HCT 41.0 (L) 02/11/2021    PLT 47 (L) 02/11/2021    CHOL 245 (H) 01/08/2019    TRIG 159 01/08/2019    HDL 60 (H) 01/08/2019    ALT 47 (H) 02/11/2021     (H) 02/11/2021     (H) 02/11/2021    K 3.0 (LL) 02/11/2021     02/11/2021    CREATININE 0.35 (L) 02/11/2021    BUN 6 02/11/2021    CO2 34 (H) 02/11/2021    TSH 0.819 02/11/2021    INR 1.2 09/04/2020    LABA1C 5.1 01/30/2018         A&P   Diagnosis Orders   1. Constipation, unspecified constipation type  TSH without Reflex    linaclotide (LINZESS) 145 MCG capsule   2. Hypogonadism male  Testosterone Free And Total Male   3. Chronic alcoholic hepatitis     4. Essential hypertension  Lipid Panel    CBC    Comprehensive Metabolic Panel   5. Hypokalemia     6. Screening for HIV (human immunodeficiency virus)  HIV Screen   7. Screening PSA (prostate specific antigen)  PSA Screening   8.  Screen for colon cancer  [facility], [facility] as ordered    Trial of linzess for constipation    Continued abstinence from alcohol       Jonathan Ospina MD

## 2021-09-21 DIAGNOSIS — K59.00 CONSTIPATION, UNSPECIFIED CONSTIPATION TYPE: ICD-10-CM

## 2021-09-21 NOTE — TELEPHONE ENCOUNTER
Pt states he was given Linzess samples at his last appt  Pt states he believes they are mostly working     Unsure if appt is needed or if provider wants to call into pharmacy. Pharmacy - [facility]    Please advise.      Pt ph. [phone]

## 2022-06-27 ENCOUNTER — TELEPHONE (OUTPATIENT)
Dept: FAMILY MEDICINE CLINIC | Age: 52
End: 2022-06-27

## 2022-06-27 NOTE — TELEPHONE ENCOUNTER
----- Message from Caesar Santana sent at 6/27/2022  2:49 PM EDT -----  Subject: Appointment Request    Reason for Call: Routine Physical Exam    QUESTIONS  Type of Appointment? Established Patient  Reason for appointment request? No appointments available during search  Additional Information for Provider? Patient needs annual physical & blood   work. Patient has lost 2 family members & is under stress screened green   on 6/27   ---------------------------------------------------------------------------  --------------  CALL BACK INFO  What is the best way for the office to contact you? OK to leave message on   voicemail  Preferred Call Back Phone Number? [phone]  ---------------------------------------------------------------------------  --------------  SCRIPT ANSWERS  Relationship to Patient? Other  Representative Name? Michell Ambrose  Additional information verified (besides Name and Date of Birth)? Address  (If the patient has Medicare as their primary insurance coverage ask this   question) Are you requesting a Medicare Annual Wellness Visit? No  (Is the patient requesting a pap smear with their physical exam?)? No  (Is the patient requesting their annual physical and does not need PAP or   AWV per above?)? Yes   Have you been diagnosed with COVID-19 in the past 10 days? No  (Service Expert  click yes below to proceed with Conjunct As Usual   Scheduling)?  Yes

## 2022-10-02 ENCOUNTER — HOSPITAL ENCOUNTER (INPATIENT)
Age: 52
LOS: 5 days | Discharge: HOME | DRG: 433 | End: 2022-10-07
Attending: FAMILY MEDICINE | Admitting: INTERNAL MEDICINE
Payer: COMMERCIAL

## 2022-10-02 ENCOUNTER — APPOINTMENT (OUTPATIENT)
Dept: CT IMAGING | Age: 52
DRG: 433 | End: 2022-10-02
Payer: COMMERCIAL

## 2022-10-02 DIAGNOSIS — K70.10 ACUTE ALCOHOLIC HEPATITIS: Primary | ICD-10-CM

## 2022-10-02 LAB
ALBUMIN SERPL-MCNC: 3.3 G/DL (ref 3.5–4.6)
ALP BLD-CCNC: 171 U/L (ref 35–104)
ALT SERPL-CCNC: 82 U/L (ref 0–41)
AMPHETAMINE SCREEN, URINE: NORMAL
ANION GAP SERPL CALCULATED.3IONS-SCNC: 6 MEQ/L (ref 9–15)
AST SERPL-CCNC: 239 U/L (ref 0–40)
BARBITURATE SCREEN URINE: NORMAL
BASOPHILS ABSOLUTE: 0 K/UL (ref 0–0.2)
BASOPHILS RELATIVE PERCENT: 0.9 %
BENZODIAZEPINE SCREEN, URINE: NORMAL
BILIRUB SERPL-MCNC: 12.5 MG/DL (ref 0.2–0.7)
BUN BLDV-MCNC: 4 MG/DL (ref 6–20)
C-REACTIVE PROTEIN: 7.3 MG/L (ref 0–5)
CALCIUM SERPL-MCNC: 9.2 MG/DL (ref 8.5–9.9)
CANNABINOID SCREEN URINE: NORMAL
CHLORIDE BLD-SCNC: 98 MEQ/L (ref 95–107)
CO2: 34 MEQ/L (ref 20–31)
COCAINE METABOLITE SCREEN URINE: NORMAL
CREAT SERPL-MCNC: 0.47 MG/DL (ref 0.7–1.2)
EOSINOPHILS ABSOLUTE: 0.1 K/UL (ref 0–0.7)
EOSINOPHILS RELATIVE PERCENT: 1.6 %
ETHANOL PERCENT: 0.05 G/DL
ETHANOL: 61 MG/DL (ref 0–0.08)
FENTANYL SCREEN, URINE: NORMAL
GFR AFRICAN AMERICAN: >60
GFR NON-AFRICAN AMERICAN: >60
GLOBULIN: 3.8 G/DL (ref 2.3–3.5)
GLUCOSE BLD-MCNC: 119 MG/DL (ref 70–99)
HCT VFR BLD CALC: 38.4 % (ref 42–52)
HEMOGLOBIN: 13.2 G/DL (ref 14–18)
INR BLD: 1.7
LACTIC ACID, SEPSIS: 1.7 MMOL/L (ref 0.5–1.9)
LACTIC ACID, SEPSIS: 1.8 MMOL/L (ref 0.5–1.9)
LIPASE: 80 U/L (ref 12–95)
LYMPHOCYTES ABSOLUTE: 0.7 K/UL (ref 1–4.8)
LYMPHOCYTES RELATIVE PERCENT: 16.7 %
Lab: NORMAL
MACROCYTES: ABNORMAL
MAGNESIUM: 1.1 MG/DL (ref 1.7–2.4)
MCH RBC QN AUTO: 35.5 PG (ref 27–31.3)
MCHC RBC AUTO-ENTMCNC: 34.3 % (ref 33–37)
MCV RBC AUTO: 103.6 FL (ref 80–100)
METHADONE SCREEN, URINE: NORMAL
MONOCYTES ABSOLUTE: 0.6 K/UL (ref 0.2–0.8)
MONOCYTES RELATIVE PERCENT: 14 %
NEUTROPHILS ABSOLUTE: 2.8 K/UL (ref 1.4–6.5)
NEUTROPHILS RELATIVE PERCENT: 66.8 %
OPIATE SCREEN URINE: NORMAL
OXYCODONE URINE: NORMAL
PDW BLD-RTO: 14.2 % (ref 11.5–14.5)
PHENCYCLIDINE SCREEN URINE: NORMAL
PLATELET # BLD: 70 K/UL (ref 130–400)
PLATELET SLIDE REVIEW: ABNORMAL
POIKILOCYTES: ABNORMAL
POTASSIUM REFLEX MAGNESIUM: 2.9 MEQ/L (ref 3.4–4.9)
PROPOXYPHENE SCREEN: NORMAL
PROTHROMBIN TIME: 19.8 SEC (ref 12.3–14.9)
RBC # BLD: 3.71 M/UL (ref 4.7–6.1)
SODIUM BLD-SCNC: 138 MEQ/L (ref 135–144)
TARGET CELLS: ABNORMAL
TOTAL PROTEIN: 7.1 G/DL (ref 6.3–8)
WBC # BLD: 4.1 K/UL (ref 4.8–10.8)

## 2022-10-02 PROCEDURE — 80307 DRUG TEST PRSMV CHEM ANLYZR: CPT

## 2022-10-02 PROCEDURE — 2060000000 HC ICU INTERMEDIATE R&B

## 2022-10-02 PROCEDURE — 83605 ASSAY OF LACTIC ACID: CPT

## 2022-10-02 PROCEDURE — 6370000000 HC RX 637 (ALT 250 FOR IP): Performed by: INTERNAL MEDICINE

## 2022-10-02 PROCEDURE — 82306 VITAMIN D 25 HYDROXY: CPT

## 2022-10-02 PROCEDURE — C9113 INJ PANTOPRAZOLE SODIUM, VIA: HCPCS | Performed by: INTERNAL MEDICINE

## 2022-10-02 PROCEDURE — 99285 EMERGENCY DEPT VISIT HI MDM: CPT

## 2022-10-02 PROCEDURE — 80074 ACUTE HEPATITIS PANEL: CPT

## 2022-10-02 PROCEDURE — 80053 COMPREHEN METABOLIC PANEL: CPT

## 2022-10-02 PROCEDURE — A4216 STERILE WATER/SALINE, 10 ML: HCPCS | Performed by: INTERNAL MEDICINE

## 2022-10-02 PROCEDURE — 2500000003 HC RX 250 WO HCPCS: Performed by: FAMILY MEDICINE

## 2022-10-02 PROCEDURE — 96375 TX/PRO/DX INJ NEW DRUG ADDON: CPT

## 2022-10-02 PROCEDURE — 83735 ASSAY OF MAGNESIUM: CPT

## 2022-10-02 PROCEDURE — 74176 CT ABD & PELVIS W/O CONTRAST: CPT

## 2022-10-02 PROCEDURE — 83690 ASSAY OF LIPASE: CPT

## 2022-10-02 PROCEDURE — 36415 COLL VENOUS BLD VENIPUNCTURE: CPT

## 2022-10-02 PROCEDURE — 86140 C-REACTIVE PROTEIN: CPT

## 2022-10-02 PROCEDURE — 96374 THER/PROPH/DIAG INJ IV PUSH: CPT

## 2022-10-02 PROCEDURE — 85025 COMPLETE CBC W/AUTO DIFF WBC: CPT

## 2022-10-02 PROCEDURE — 85610 PROTHROMBIN TIME: CPT

## 2022-10-02 PROCEDURE — 82077 ASSAY SPEC XCP UR&BREATH IA: CPT

## 2022-10-02 PROCEDURE — 6360000002 HC RX W HCPCS: Performed by: FAMILY MEDICINE

## 2022-10-02 PROCEDURE — 2580000003 HC RX 258: Performed by: FAMILY MEDICINE

## 2022-10-02 PROCEDURE — 6360000002 HC RX W HCPCS: Performed by: INTERNAL MEDICINE

## 2022-10-02 PROCEDURE — 2580000003 HC RX 258: Performed by: INTERNAL MEDICINE

## 2022-10-02 RX ORDER — POTASSIUM CHLORIDE 7.45 MG/ML
10 INJECTION INTRAVENOUS
Status: COMPLETED | OUTPATIENT
Start: 2022-10-02 | End: 2022-10-03

## 2022-10-02 RX ORDER — ONDANSETRON 2 MG/ML
4 INJECTION INTRAMUSCULAR; INTRAVENOUS ONCE
Status: COMPLETED | OUTPATIENT
Start: 2022-10-02 | End: 2022-10-02

## 2022-10-02 RX ORDER — SODIUM CHLORIDE 0.9 % (FLUSH) 0.9 %
5-40 SYRINGE (ML) INJECTION EVERY 12 HOURS SCHEDULED
Status: DISCONTINUED | OUTPATIENT
Start: 2022-10-02 | End: 2022-10-07 | Stop reason: HOSPADM

## 2022-10-02 RX ORDER — ENOXAPARIN SODIUM 100 MG/ML
40 INJECTION SUBCUTANEOUS DAILY
Status: DISCONTINUED | OUTPATIENT
Start: 2022-10-02 | End: 2022-10-02

## 2022-10-02 RX ORDER — ONDANSETRON 2 MG/ML
4 INJECTION INTRAMUSCULAR; INTRAVENOUS EVERY 6 HOURS PRN
Status: DISCONTINUED | OUTPATIENT
Start: 2022-10-02 | End: 2022-10-07 | Stop reason: HOSPADM

## 2022-10-02 RX ORDER — LORAZEPAM 1 MG/1
3 TABLET ORAL
Status: DISCONTINUED | OUTPATIENT
Start: 2022-10-02 | End: 2022-10-07 | Stop reason: HOSPADM

## 2022-10-02 RX ORDER — LORAZEPAM 1 MG/1
1 TABLET ORAL
Status: DISCONTINUED | OUTPATIENT
Start: 2022-10-02 | End: 2022-10-07 | Stop reason: HOSPADM

## 2022-10-02 RX ORDER — METRONIDAZOLE 500 MG/100ML
500 INJECTION, SOLUTION INTRAVENOUS EVERY 8 HOURS
Status: DISCONTINUED | OUTPATIENT
Start: 2022-10-02 | End: 2022-10-04

## 2022-10-02 RX ORDER — SODIUM CHLORIDE 9 MG/ML
INJECTION, SOLUTION INTRAVENOUS PRN
Status: DISCONTINUED | OUTPATIENT
Start: 2022-10-02 | End: 2022-10-07 | Stop reason: HOSPADM

## 2022-10-02 RX ORDER — SODIUM CHLORIDE 0.9 % (FLUSH) 0.9 %
5-40 SYRINGE (ML) INJECTION PRN
Status: DISCONTINUED | OUTPATIENT
Start: 2022-10-02 | End: 2022-10-07 | Stop reason: HOSPADM

## 2022-10-02 RX ORDER — CIPROFLOXACIN 2 MG/ML
400 INJECTION, SOLUTION INTRAVENOUS EVERY 12 HOURS
Status: DISCONTINUED | OUTPATIENT
Start: 2022-10-02 | End: 2022-10-04

## 2022-10-02 RX ORDER — LORAZEPAM 1 MG/1
4 TABLET ORAL
Status: DISCONTINUED | OUTPATIENT
Start: 2022-10-02 | End: 2022-10-07 | Stop reason: HOSPADM

## 2022-10-02 RX ORDER — LORAZEPAM 1 MG/1
2 TABLET ORAL
Status: DISCONTINUED | OUTPATIENT
Start: 2022-10-02 | End: 2022-10-07 | Stop reason: HOSPADM

## 2022-10-02 RX ORDER — LORAZEPAM 2 MG/ML
1 INJECTION INTRAMUSCULAR
Status: DISCONTINUED | OUTPATIENT
Start: 2022-10-02 | End: 2022-10-07 | Stop reason: HOSPADM

## 2022-10-02 RX ORDER — LORAZEPAM 2 MG/ML
2 INJECTION INTRAMUSCULAR
Status: DISCONTINUED | OUTPATIENT
Start: 2022-10-02 | End: 2022-10-07 | Stop reason: HOSPADM

## 2022-10-02 RX ORDER — LANOLIN ALCOHOL/MO/W.PET/CERES
100 CREAM (GRAM) TOPICAL DAILY
Status: DISCONTINUED | OUTPATIENT
Start: 2022-10-02 | End: 2022-10-07 | Stop reason: HOSPADM

## 2022-10-02 RX ORDER — MAGNESIUM SULFATE IN WATER 40 MG/ML
2000 INJECTION, SOLUTION INTRAVENOUS ONCE
Status: COMPLETED | OUTPATIENT
Start: 2022-10-02 | End: 2022-10-02

## 2022-10-02 RX ORDER — POTASSIUM CHLORIDE 20 MEQ/1
40 TABLET, EXTENDED RELEASE ORAL ONCE
Status: COMPLETED | OUTPATIENT
Start: 2022-10-02 | End: 2022-10-02

## 2022-10-02 RX ORDER — SODIUM CHLORIDE 9 MG/ML
INJECTION, SOLUTION INTRAVENOUS CONTINUOUS
Status: DISCONTINUED | OUTPATIENT
Start: 2022-10-02 | End: 2022-10-07 | Stop reason: HOSPADM

## 2022-10-02 RX ORDER — MULTIVITAMIN WITH IRON
1 TABLET ORAL DAILY
Status: DISCONTINUED | OUTPATIENT
Start: 2022-10-02 | End: 2022-10-07 | Stop reason: HOSPADM

## 2022-10-02 RX ORDER — LORAZEPAM 2 MG/ML
4 INJECTION INTRAMUSCULAR
Status: DISCONTINUED | OUTPATIENT
Start: 2022-10-02 | End: 2022-10-07 | Stop reason: HOSPADM

## 2022-10-02 RX ORDER — BACITRACIN 500 UNIT/G
300 OINTMENT (GRAM) TOPICAL DAILY
COMMUNITY

## 2022-10-02 RX ORDER — LORAZEPAM 2 MG/ML
3 INJECTION INTRAMUSCULAR
Status: DISCONTINUED | OUTPATIENT
Start: 2022-10-02 | End: 2022-10-07 | Stop reason: HOSPADM

## 2022-10-02 RX ORDER — ONDANSETRON 4 MG/1
4 TABLET, ORALLY DISINTEGRATING ORAL EVERY 8 HOURS PRN
Status: DISCONTINUED | OUTPATIENT
Start: 2022-10-02 | End: 2022-10-07 | Stop reason: HOSPADM

## 2022-10-02 RX ADMIN — THERA TABS 1 TABLET: TAB at 18:42

## 2022-10-02 RX ADMIN — SODIUM CHLORIDE, PRESERVATIVE FREE 40 MG: 5 INJECTION INTRAVENOUS at 18:43

## 2022-10-02 RX ADMIN — Medication 100 MG: at 18:42

## 2022-10-02 RX ADMIN — POTASSIUM CHLORIDE 10 MEQ: 7.46 INJECTION, SOLUTION INTRAVENOUS at 19:06

## 2022-10-02 RX ADMIN — POTASSIUM CHLORIDE 10 MEQ: 7.46 INJECTION, SOLUTION INTRAVENOUS at 22:53

## 2022-10-02 RX ADMIN — POTASSIUM CHLORIDE 40 MEQ: 1500 TABLET, EXTENDED RELEASE ORAL at 18:42

## 2022-10-02 RX ADMIN — LORAZEPAM 1 MG: 1 TABLET ORAL at 22:55

## 2022-10-02 RX ADMIN — ONDANSETRON 4 MG: 2 INJECTION INTRAMUSCULAR; INTRAVENOUS at 13:35

## 2022-10-02 RX ADMIN — FOLIC ACID: 5 INJECTION, SOLUTION INTRAMUSCULAR; INTRAVENOUS; SUBCUTANEOUS at 13:28

## 2022-10-02 RX ADMIN — Medication 10 ML: at 22:56

## 2022-10-02 RX ADMIN — MAGNESIUM SULFATE HEPTAHYDRATE 2000 MG: 40 INJECTION, SOLUTION INTRAVENOUS at 18:55

## 2022-10-02 RX ADMIN — CIPROFLOXACIN 400 MG: 2 INJECTION, SOLUTION INTRAVENOUS at 18:58

## 2022-10-02 ASSESSMENT — ENCOUNTER SYMPTOMS
FLATUS: 1
ABDOMINAL PAIN: 1
CONSTIPATION: 0
HEMATEMESIS: 0
NAUSEA: 1
SHORTNESS OF BREATH: 0
SORE THROAT: 0
HEMATOCHEZIA: 0
BELCHING: 0
VOMITING: 0
COUGH: 0
DIARRHEA: 0

## 2022-10-02 ASSESSMENT — PAIN DESCRIPTION - PAIN TYPE: TYPE: ACUTE PAIN

## 2022-10-02 ASSESSMENT — PAIN SCALES - GENERAL
PAINLEVEL_OUTOF10: 0
PAINLEVEL_OUTOF10: 4
PAINLEVEL_OUTOF10: 2

## 2022-10-02 ASSESSMENT — LIFESTYLE VARIABLES
HOW OFTEN DO YOU HAVE A DRINK CONTAINING ALCOHOL: 4 OR MORE TIMES A WEEK
HOW MANY STANDARD DRINKS CONTAINING ALCOHOL DO YOU HAVE ON A TYPICAL DAY: 10 OR MORE
HOW MANY STANDARD DRINKS CONTAINING ALCOHOL DO YOU HAVE ON A TYPICAL DAY: 10 OR MORE
HOW OFTEN DO YOU HAVE A DRINK CONTAINING ALCOHOL: 4 OR MORE TIMES A WEEK

## 2022-10-02 ASSESSMENT — PAIN - FUNCTIONAL ASSESSMENT
PAIN_FUNCTIONAL_ASSESSMENT: ACTIVITIES ARE NOT PREVENTED
PAIN_FUNCTIONAL_ASSESSMENT: 0-10

## 2022-10-02 ASSESSMENT — PAIN DESCRIPTION - DIRECTION: RADIATING_TOWARDS: TO BACK

## 2022-10-02 ASSESSMENT — PAIN DESCRIPTION - LOCATION
LOCATION: ABDOMEN
LOCATION: ABDOMEN

## 2022-10-02 ASSESSMENT — PAIN DESCRIPTION - ORIENTATION: ORIENTATION: MID

## 2022-10-02 ASSESSMENT — PAIN DESCRIPTION - FREQUENCY: FREQUENCY: CONTINUOUS

## 2022-10-02 ASSESSMENT — PAIN DESCRIPTION - DESCRIPTORS
DESCRIPTORS: ACHING
DESCRIPTORS: SHARP

## 2022-10-02 ASSESSMENT — PAIN DESCRIPTION - ONSET: ONSET: ON-GOING

## 2022-10-02 NOTE — ED PROVIDER NOTES
[facility] [facility] [place] 2[place]  eMERGENCY dEPARTMENT eNCOUnter      Pt Name: Caryle Code  MRN: [id number]  [place] 1970  Date of evaluation: 10/2/2022  Provider: Marla Mireles MD    CHIEF COMPLAINT       Chief Complaint   Patient presents with    Abdominal Pain    Jaundice    Insomnia         HISTORY OF PRESENT ILLNESS   (Location/Symptom, Timing/Onset,Context/Setting, Quality, Duration, Modifying Factors, Severity)  Note limiting factors. Caryle Code is a 46 y.o. male who presents to the emergency department abdominal   pain jaundice and    The history is provided by the patient. Abdominal Pain  Pain location:  RUQ  Pain quality: aching and cramping    Pain radiates to:  Does not radiate  Pain severity:  Moderate  Onset quality:  Gradual  Duration:  3 days  Timing:  Constant  Progression:  Worsening  Context: alcohol use and diet changes    Context: not awakening from sleep, not eating, not laxative use, not medication withdrawal, not previous surgeries, not recent illness, not recent sexual activity, not recent travel, not retching, not sick contacts, not suspicious food intake and not trauma    Relieved by:  Nothing  Worsened by:  Nothing  Ineffective treatments:  None tried  Associated symptoms: anorexia, fatigue, flatus and nausea    Associated symptoms: no belching, no chest pain, no chills, no constipation, no cough, no diarrhea, no dysuria, no fever, no hematemesis, no hematochezia, no melena, no shortness of breath, no sore throat, no vaginal bleeding, no vaginal discharge and no vomiting    Insomnia  Associated symptoms include abdominal pain. Pertinent negatives include no chest pain and no shortness of breath. NursingNotes were reviewed. REVIEW OF SYSTEMS    (2-9 systems for level 4, 10 or more for level 5)     Review of Systems   Constitutional:  Positive for fatigue. Negative for chills and fever. HENT:  Negative for sore throat. Respiratory:  Negative for cough and shortness of breath. Cardiovascular:  Negative for chest pain. Gastrointestinal:  Positive for abdominal pain, anorexia, flatus and nausea. Negative for constipation, diarrhea, hematemesis, hematochezia, melena and vomiting. Genitourinary:  Negative for dysuria, vaginal bleeding and vaginal discharge. Psychiatric/Behavioral:  The patient has insomnia. Except as noted above the remainder of the review of systems was reviewed and negative. PAST MEDICAL HISTORY     Past Medical History:   Diagnosis Date    Chest pressure 3/8/2016    Cholelithiasis     Chronic alcoholic hepatitis     History of alcohol use     Hypertension 3/8/2016         SURGICALHISTORY       Past Surgical History:   Procedure Laterality Date    CYST REMOVAL      CT LAP,CHOLECYSTECTOMY/GRAPH N/A 2/19/2018    LAPAROSCOPIC CHOLECYSTECTOMY WITH GRAMS performed by Leonard Mcgill MD at [place] [address]      Deviated Septum    TONSILLECTOMY           CURRENT MEDICATIONS       Current Discharge Medication List        CONTINUE these medications which have NOT CHANGED    Details   Milk Thistle 300 MG CAPS Take 300 mg by mouth daily      Multiple Vitamin (MULTI VITAMIN PO) Take by mouth      PROAIR  (90 Base) MCG/ACT inhaler INHALE 2 PUFFS BY MOUTH 4 TIMES DAILY AS NEEDED FOR SHORTNESS OF BREATH.   Refills: 0             ALLERGIES     Acetaminophen    FAMILY HISTORY       Family History   Problem Relation Age of Onset    Alcohol Abuse Father     Diabetes Brother     Heart Attack Paternal Grandfather     Crohn's Disease Maternal Aunt     Colon Cancer Neg Hx     Celiac Disease Neg Hx           SOCIAL HISTORY       Social History     Socioeconomic History    Marital status:    Tobacco Use    Smoking status: Every Day     Packs/day: 1.00     Types: Cigarettes    Smokeless tobacco: Never   Substance and Sexual Activity    Alcohol use: Not Currently    Drug use: No       SCREENINGS    Sharri Coma Scale  Eye Opening: Spontaneous  Best Verbal Response: Oriented  Best Motor Response: Obeys commands  [place] Coma Scale Score: 15 @FLOW(62336561)@      PHYSICAL EXAM    (up to 7 for level 4, 8 or more for level 5)     ED Triage Vitals [10/02/22 1231]   BP Temp Temp Source Heart Rate Resp SpO2 Height Weight   127/81 98.4 °F (36.9 °C) Oral 93 16 98 % 6' (1.829 m) 190 lb (86.2 kg)       Physical Exam  Vitals and nursing note reviewed. Constitutional:       Appearance: He is well-developed. HENT:      Head: Normocephalic and atraumatic. Right Ear: External ear normal.      Left Ear: External ear normal.      Nose: Nose normal.   Eyes:      General: Scleral icterus present. Pupils: Pupils are equal, round, and reactive to light. Cardiovascular:      Rate and Rhythm: Normal rate and regular rhythm. Heart sounds: Normal heart sounds. Pulmonary:      Effort: Pulmonary effort is normal. No respiratory distress. Breath sounds: Normal breath sounds. No wheezing or rales. Chest:      Chest wall: No tenderness. Abdominal:      General: Abdomen is protuberant. Bowel sounds are normal.      Palpations: Abdomen is soft. Tenderness: There is abdominal tenderness in the right upper quadrant. Musculoskeletal:         General: Normal range of motion. Cervical back: Normal range of motion and neck supple. Skin:     General: Skin is warm and dry. Neurological:      Mental Status: He is alert and oriented to person, place, and time. Cranial Nerves: No cranial nerve deficit. Sensory: No sensory deficit. Motor: No abnormal muscle tone. Coordination: Coordination normal.      Deep Tendon Reflexes: Reflexes normal.   Psychiatric:         Behavior: Behavior normal.         Thought Content:  Thought content normal.         Judgment: Judgment normal.       DIAGNOSTIC RESULTS     EKG: All EKG's are interpreted by the Emergency Department Physician who either signs or Co-signsthis chart in the absence of a cardiologist.        RADIOLOGY:   [place] such as CT, Ultrasound and MRI are read by the radiologist. Plain radiographic images are visualized and preliminarily interpreted by the emergency physician with the below findings:      Interpretation per the Radiologist below, if available at the time ofthis note:    CT ABDOMEN PELVIS WO CONTRAST Additional Contrast? None   Final Result   Stranding of the peritoneal fat planes is visualized most prominent in the   right perirenal fat planes and in the right paracolic gutter. Cannot optimally evaluate for renal mass or focal inflammatory changes in the   absence of intravenous contrast material.      Hepatic steatosis. Degenerative joint changes.          US ABDOMEN COMPLETE    (Results Pending)          ED BEDSIDE ULTRASOUND:   Performed by ED Physician - none    LABS:  Labs Reviewed   COMPREHENSIVE METABOLIC PANEL W/ REFLEX TO MG FOR LOW K - Abnormal; Notable for the following components:       Result Value    Potassium reflex Magnesium 2.9 (*)     CO2 34 (*)     Anion Gap 6 (*)     Glucose 119 (*)     BUN 4 (*)     Creatinine 0.47 (*)     Albumin 3.3 (*)     Total Bilirubin 12.5 (*)     Alkaline Phosphatase 171 (*)     ALT 82 (*)      (*)     Globulin 3.8 (*)     All other components within normal limits    Narrative:     CALL  Win  [facility] tel. [phone],  Potassium results called to and read back by Salvador West, 10/02/2022 15:08,  by Olvin Saab   C-REACTIVE PROTEIN - Abnormal; Notable for the following components:    CRP 7.3 (*)     All other components within normal limits   CBC WITH AUTO DIFFERENTIAL - Abnormal; Notable for the following components:    WBC 4.1 (*)     RBC 3.71 (*)     Hemoglobin 13.2 (*)     Hematocrit 38.4 (*)     .6 (*)     MCH 35.5 (*)     Platelets 70 (*)     Lymphocytes Absolute 0.7 (*)     All other components within normal limits   MAGNESIUM - Abnormal; Notable for the following components:    Magnesium 1.1 (*) All other components within normal limits    Narrative:     Mo MARQUEZED tel. [phone],  Potassium results called to and read back by Joaquim Elizondo, 10/02/2022 15:08,  by Nik Hernandez - Abnormal; Notable for the following components:    Protime 19.8 (*)     All other components within normal limits   LIPASE    Narrative:     Mo MARQUEZED tel. [phone],  Potassium results called to and read back by Joaquim Elizondo, 10/02/2022 15:08,  by Denzel Diaz   LACTATE, SEPSIS   ETHANOL   URINE DRUG SCREEN   LACTATE, SEPSIS   HEPATITIS PANEL, ACUTE   VITAMIN D 25 HYDROXY   COMPREHENSIVE METABOLIC PANEL W/ REFLEX TO MG FOR LOW K   CBC WITH AUTO DIFFERENTIAL   MAGNESIUM   PROTIME-INR       All other labs were within normal range or not returned as of this dictation.     EMERGENCY DEPARTMENT COURSE and DIFFERENTIAL DIAGNOSIS/MDM:   Vitals:    Vitals:    10/02/22 1500 10/02/22 1530 10/02/22 1600 10/02/22 1700   BP: 107/80 114/73 113/81 136/85   Pulse: 80 78 81 86   Resp:    14   Temp:    98.2 °F (36.8 °C)   TempSrc:    Oral   SpO2: 97% 96% 98% 100%   Weight:       Height:                    MDM  Number of Diagnoses or Management Options  Acute alcoholic hepatitis  Diagnosis management comments: Patient with history of alcohol dependence in the past have been sober since February of last year states his dad  in  so he went back to drinking presented today with acute right upper quadrant abdominal pain with nausea jaundice decreased appetite was found to have significantly elevated bilirubin at 12.0 with elevated liver function tests consistent with a diagnosis with acute alcoholic hepatitis patient was given banana bag will be admitted under the hospitalist to rule out infectious process and for supportive measures       Amount and/or Complexity of Data Reviewed  Clinical lab tests: ordered and reviewed  Tests in the radiology section of CPT®: ordered and reviewed        CONSULTS:  IP CONSULT TO GI  IP CONSULT TO SOCIAL WORK    PROCEDURES:  Unless otherwise noted below, none     Procedures    FINAL IMPRESSION      1. Acute alcoholic hepatitis          DISPOSITION/PLAN   DISPOSITION Admitted 10/02/2022 03:54:23 PM      PATIENT REFERRED TO:  No follow-up provider specified.     DISCHARGE MEDICATIONS:  Current Discharge Medication List             (Please note thatportions of this note were completed with a voice recognition program.  Efforts were made to edit the dictations but occasionally words are mis-transcribed.)    Jean Paez MD (electronically signed)  Attending Emergency Physician          Carroll Raymond MD  10/02/22 2056

## 2022-10-02 NOTE — H&P
History and Physical    Admit Date: 10/2/2022  PCP: Zenobia Webb MD    CHIEF COMPLAINT:    Chief Complaint   Patient presents with    Abdominal Pain    Jaundice    Insomnia        HISTORY OF PRESENT ILLNESS:    The patient is a 46 y.o. male with a past medical history of alcohol abuse, hypertension, cholelithiasis who presented to the hospital with abdominal pain jaundice and insomnia. Patient reports that he has been sober on and off over the last 2 to 3 years. His father passed away in June and he started drinking alcohol heavily. He has been drinking 10-15 drinks per day. Patient was feeling well up until yesterday even though he describes some jaundice however, today, patient felt more epigastric pain and noted to his skin is much more jaundiced. He denies hematemesis, melena although he reports that his stool is darkish brown. No history of EGD. Denies changes in bowel habits. No diarrhea or constipation. No chest pain or chest heaviness. No fever or chills. No dyspnea or cough. In the ED, his bilirubin is noted to be above 12, AST,  and 82 respectively, his potassium is 2.9, lipase is 80, ethanol level is 61, he had not had an alcoholic drink since last night. Urine toxicology screen is otherwise negative. Lactate 1.8. Acute hepatitis panel is pending. CT of the abdomen showed no CBD dilatation, no intrahepatic or extrahepatic dilatation, his gallbladder is absent surgically. Normal pancreas. He has mild pericolic stranding.     Past Medical History:        Diagnosis Date    Chest pressure 3/8/2016    Cholelithiasis     Chronic alcoholic hepatitis     History of alcohol use     Hypertension 3/8/2016       Past Surgical History:        Procedure Laterality Date    CYST REMOVAL      [place] LAP,CHOLECYSTECTOMY/GRAPH N/A 2/19/2018    LAPAROSCOPIC CHOLECYSTECTOMY WITH GRAMS performed by Adore Degroot MD at [place] [address] Septum    TONSILLECTOMY         Social History:   Social History     Socioeconomic History    Marital status:      Spouse name: Not on file    Number of children: Not on file    Years of education: Not on file    Highest education level: Not on file   Occupational History    Not on file   Tobacco Use    Smoking status: Every Day     Packs/day: 0.50     Types: Cigarettes    Smokeless tobacco: Never   Substance and Sexual Activity    Alcohol use: No    Drug use: No    Sexual activity: Not on file   Other Topics Concern    Not on file   Social History Narrative    Not on file     Social Determinants of Health     Financial Resource Strain: Not on file   Food Insecurity: Not on file   Transportation Needs: Not on file   Physical Activity: Not on file   Stress: Not on file   Social Connections: Not on file   Intimate Partner Violence: Not on file   Housing Stability: Not on file       Family History:   Family History   Problem Relation Age of Onset    Alcohol Abuse Father     Diabetes Brother     Heart Attack Paternal Grandfather     Crohn's Disease Maternal Aunt     Colon Cancer Neg Hx     Celiac Disease Neg Hx        Medications Prior to Admission:    Prior to Admission medications    Medication Sig Start Date End Date Taking? Authorizing Provider   linaclotide [place]) 145 MCG capsule Take 1 capsule by mouth every morning (before breakfast) 21   Simone Camacho MD   Multiple Vitamin (MULTI VITAMIN PO) Take by mouth    Historical Provider, MD   PROAIR  (90 Base) MCG/ACT inhaler INHALE 2 PUFFS BY MOUTH 4 TIMES DAILY AS NEEDED FOR SHORTNESS OF BREATH. 17   Historical Provider, MD       Allergies:  Acetaminophen    REVIEW OF SYSTEMS:  All systems reviewed and negative except for what is in HPI. PHYSICAL EXAM:  Vitals:  /73   Pulse 78   Temp 98.4 °F (36.9 °C) (Oral)   Resp 16   Ht 6' (1.829 m)   Wt 190 lb (86.2 kg)   SpO2 96%   BMI 25.77 kg/m²   BMI Classification: Overweight (BMI 25.0-29. 9)  Pulse Ox:  SpO2  Av.8 % Min: 96 %  Max: 99 %  Supplemental O2:      CONSTITUTIONAL:  awake, alert, cooperative, no apparent distress, and appears stated age  [de-identified]: Normocephalic, PERRLA  NECK: no JVD, no LAD  HEART: RRR, no murmurs, gallops, or rubs  LUNGS: clear to auscultation bilaterally, no wheezes, crackles, or rhonchi.   ABDOMEN: soft/NT/ND, positive BS  MUSCULOSKELETAL: negative for edema, +2 pulses  SKIN: + jaundice  NEURO:  CN intact and no focal deficits    DATA:  Recent Results (from the past 24 hour(s))   Comprehensive Metabolic Panel w/ Reflex to MG    Collection Time: 10/02/22  1:00 PM   Result Value Ref Range    Sodium 138 135 - 144 mEq/L    Potassium reflex Magnesium 2.9 (LL) 3.4 - 4.9 mEq/L    Chloride 98 95 - 107 mEq/L    CO2 34 (H) 20 - 31 mEq/L    Anion Gap 6 (L) 9 - 15 mEq/L    Glucose 119 (H) 70 - 99 mg/dL    BUN 4 (L) 6 - 20 mg/dL    Creatinine 0.47 (L) 0.70 - 1.20 mg/dL    GFR Non-African American >60.0 >60    GFR  >60.0 >60    Calcium 9.2 8.5 - 9.9 mg/dL    Total Protein 7.1 6.3 - 8.0 g/dL    Albumin 3.3 (L) 3.5 - 4.6 g/dL    Total Bilirubin 12.5 (H) 0.2 - 0.7 mg/dL    Alkaline Phosphatase 171 (H) 35 - 104 U/L    ALT 82 (H) 0 - 41 U/L     (H) 0 - 40 U/L    Globulin 3.8 (H) 2.3 - 3.5 g/dL   Lipase    Collection Time: 10/02/22  1:00 PM   Result Value Ref Range    Lipase 80 12 - 95 U/L   C-Reactive Protein    Collection Time: 10/02/22  1:00 PM   Result Value Ref Range    CRP 7.3 (H) 0.0 - 5.0 mg/L   Ethanol    Collection Time: 10/02/22  1:00 PM   Result Value Ref Range    Ethanol Lvl 61 mg/dL    Ethanol percent 0.054 G/dL   Urine Drug Screen    Collection Time: 10/02/22  1:00 PM   Result Value Ref Range    Amphetamine Screen, Urine Neg Negative <1000 ng/mL    Barbiturate Screen, Ur Neg Negative < 200 ng/mL    Benzodiazepine Screen, Urine Neg Negative < 200 ng/mL    Cannabinoid Scrn, Ur Neg Negative < 50 ng/mL    Cocaine Metabolite Screen, Urine Neg Negative < 300 ng/mL    Opiate Scrn, Ur Neg Negative < 300 ng/mL    PCP Screen, Urine Neg Negative < 25 ng/mL    Methadone Screen, Urine Neg Negative <300 ng/mL    Propoxyphene Scrn, Ur Neg Negative <300 ng/mL    Oxycodone Urine Neg Negative <100 ng/mL    FENTANYL SCREEN, URINE Neg Negative < 50 ng/mL    Drug Screen Comment: see below    Lactate, Sepsis    Collection Time: 10/02/22  1:00 PM   Result Value Ref Range    Lactic Acid, Sepsis 1.8 0.5 - 1.9 mmol/L   Magnesium    Collection Time: 10/02/22  1:00 PM   Result Value Ref Range    Magnesium 1.1 (L) 1.7 - 2.4 mg/dL   CBC with Auto Differential    Collection Time: 10/02/22  1:45 PM   Result Value Ref Range    WBC 4.1 (L) 4.8 - 10.8 K/uL    RBC 3.71 (L) 4.70 - 6.10 M/uL    Hemoglobin 13.2 (L) 14.0 - 18.0 g/dL    Hematocrit 38.4 (L) 42.0 - 52.0 %    .6 (H) 80.0 - 100.0 fL    MCH 35.5 (H) 27.0 - 31.3 pg    MCHC 34.3 33.0 - 37.0 %    RDW 14.2 11.5 - 14.5 %    Platelets 70 (L) 446 - 400 K/uL           ASSESSMENT AND PLAN:    Acute alcoholic hepatitis  Jaundice due to above  Possible colitis  Coagulopathy-likely due to liver dysfunction  Hypokalemia  Hypomagnesemia  Alcohol abuse  Hypertension  Adjustment disorder    Plan  We will admit the patient to MedSurg floor  Start IV hydration  Banana bag  [facility] protocol  GI consult-Dr. Abbey Caputo contacted in emergency room, recommended against steroids until infection is ruled out  Obtained abdominal ultrasound  Will treat with Cipro and Flagyl for possible colitis  Replete potassium and magnesium, recheck in the morning  IV Protonix  Daily INR  Daily CMP  Daily CBC  Discussed plan of care with patient and his wife at bedside  Home medication will be ordered as appropriate once reconciled by RN/pharmacy  DVT prophylaxis-SCDs, will hold off on anticoagulation given dark stools         DISCHARGE PLANNING  Inpatient    Code status: Full code    Electronically signed by Edwin Reid DO on 10/2/22 at 3:54 PM EDT

## 2022-10-02 NOTE — ED TRIAGE NOTES
Increase in jaundice x3 days, weight loss 15lbs over 2 months, insomnia. All since father passing. ABD pain 2-3. ALos drinking, 6-10 wite claws.

## 2022-10-03 ENCOUNTER — APPOINTMENT (OUTPATIENT)
Dept: GENERAL RADIOLOGY | Age: 52
DRG: 433 | End: 2022-10-03
Payer: COMMERCIAL

## 2022-10-03 ENCOUNTER — TELEPHONE (OUTPATIENT)
Dept: FAMILY MEDICINE CLINIC | Age: 52
End: 2022-10-03

## 2022-10-03 ENCOUNTER — APPOINTMENT (OUTPATIENT)
Dept: ULTRASOUND IMAGING | Age: 52
DRG: 433 | End: 2022-10-03
Payer: COMMERCIAL

## 2022-10-03 LAB
ALBUMIN SERPL-MCNC: 2.6 G/DL (ref 3.5–4.6)
ALP BLD-CCNC: 149 U/L (ref 35–104)
ALT SERPL-CCNC: 67 U/L (ref 0–41)
ANION GAP SERPL CALCULATED.3IONS-SCNC: 8 MEQ/L (ref 9–15)
ANION GAP SERPL CALCULATED.3IONS-SCNC: 9 MEQ/L (ref 9–15)
AST SERPL-CCNC: 204 U/L (ref 0–40)
BASOPHILS ABSOLUTE: 0 K/UL (ref 0–0.2)
BASOPHILS RELATIVE PERCENT: 0.5 %
BILIRUB SERPL-MCNC: 12.1 MG/DL (ref 0.2–0.7)
BUN BLDV-MCNC: 5 MG/DL (ref 6–20)
BUN BLDV-MCNC: 5 MG/DL (ref 6–20)
CALCIUM SERPL-MCNC: 8.2 MG/DL (ref 8.5–9.9)
CALCIUM SERPL-MCNC: 8.3 MG/DL (ref 8.5–9.9)
CHLORIDE BLD-SCNC: 101 MEQ/L (ref 95–107)
CHLORIDE BLD-SCNC: 104 MEQ/L (ref 95–107)
CO2: 28 MEQ/L (ref 20–31)
CO2: 30 MEQ/L (ref 20–31)
CREAT SERPL-MCNC: 0.2 MG/DL (ref 0.7–1.2)
CREAT SERPL-MCNC: 0.29 MG/DL (ref 0.7–1.2)
EOSINOPHILS ABSOLUTE: 0.1 K/UL (ref 0–0.7)
EOSINOPHILS RELATIVE PERCENT: 3.3 %
GFR AFRICAN AMERICAN: >60
GFR AFRICAN AMERICAN: >60
GFR NON-AFRICAN AMERICAN: >60
GFR NON-AFRICAN AMERICAN: >60
GLOBULIN: 3.3 G/DL (ref 2.3–3.5)
GLUCOSE BLD-MCNC: 105 MG/DL (ref 70–99)
GLUCOSE BLD-MCNC: 97 MG/DL (ref 70–99)
HAV IGM SER IA-ACNC: NONREACTIVE
HCT VFR BLD CALC: 30.5 % (ref 42–52)
HCT VFR BLD CALC: 32.4 % (ref 42–52)
HEMOGLOBIN: 10.6 G/DL (ref 14–18)
HEMOGLOBIN: 11.2 G/DL (ref 14–18)
HEPATITIS B CORE IGM ANTIBODY: NONREACTIVE
HEPATITIS B SURFACE ANTIGEN: NONREACTIVE
HEPATITIS C ANTIBODY: NONREACTIVE
INR BLD: 1.8
LYMPHOCYTES ABSOLUTE: 0.9 K/UL (ref 1–4.8)
LYMPHOCYTES RELATIVE PERCENT: 22.3 %
MAGNESIUM: 1.3 MG/DL (ref 1.7–2.4)
MCH RBC QN AUTO: 36.1 PG (ref 27–31.3)
MCH RBC QN AUTO: 36.5 PG (ref 27–31.3)
MCHC RBC AUTO-ENTMCNC: 34.5 % (ref 33–37)
MCHC RBC AUTO-ENTMCNC: 34.7 % (ref 33–37)
MCV RBC AUTO: 104.7 FL (ref 80–100)
MCV RBC AUTO: 105.1 FL (ref 80–100)
MONOCYTES ABSOLUTE: 0.6 K/UL (ref 0.2–0.8)
MONOCYTES RELATIVE PERCENT: 15.1 %
NEUTROPHILS ABSOLUTE: 2.5 K/UL (ref 1.4–6.5)
NEUTROPHILS RELATIVE PERCENT: 58.8 %
PDW BLD-RTO: 14.1 % (ref 11.5–14.5)
PDW BLD-RTO: 14.2 % (ref 11.5–14.5)
PLATELET # BLD: 55 K/UL (ref 130–400)
PLATELET # BLD: 62 K/UL (ref 130–400)
POTASSIUM REFLEX MAGNESIUM: 3 MEQ/L (ref 3.4–4.9)
POTASSIUM SERPL-SCNC: 3.4 MEQ/L (ref 3.4–4.9)
PROTHROMBIN TIME: 21.4 SEC (ref 12.3–14.9)
RBC # BLD: 2.91 M/UL (ref 4.7–6.1)
RBC # BLD: 3.1 M/UL (ref 4.7–6.1)
SODIUM BLD-SCNC: 139 MEQ/L (ref 135–144)
SODIUM BLD-SCNC: 141 MEQ/L (ref 135–144)
TOTAL PROTEIN: 5.9 G/DL (ref 6.3–8)
VITAMIN D 25-HYDROXY: 48.1 NG/ML
WBC # BLD: 3.8 K/UL (ref 4.8–10.8)
WBC # BLD: 4.2 K/UL (ref 4.8–10.8)

## 2022-10-03 PROCEDURE — 80053 COMPREHEN METABOLIC PANEL: CPT

## 2022-10-03 PROCEDURE — 2580000003 HC RX 258: Performed by: INTERNAL MEDICINE

## 2022-10-03 PROCEDURE — 76705 ECHO EXAM OF ABDOMEN: CPT

## 2022-10-03 PROCEDURE — 6370000000 HC RX 637 (ALT 250 FOR IP): Performed by: INTERNAL MEDICINE

## 2022-10-03 PROCEDURE — 6360000002 HC RX W HCPCS: Performed by: INTERNAL MEDICINE

## 2022-10-03 PROCEDURE — 85610 PROTHROMBIN TIME: CPT

## 2022-10-03 PROCEDURE — 87040 BLOOD CULTURE FOR BACTERIA: CPT

## 2022-10-03 PROCEDURE — 85027 COMPLETE CBC AUTOMATED: CPT

## 2022-10-03 PROCEDURE — 2060000000 HC ICU INTERMEDIATE R&B

## 2022-10-03 PROCEDURE — 2500000003 HC RX 250 WO HCPCS: Performed by: INTERNAL MEDICINE

## 2022-10-03 PROCEDURE — A4216 STERILE WATER/SALINE, 10 ML: HCPCS | Performed by: INTERNAL MEDICINE

## 2022-10-03 PROCEDURE — C9113 INJ PANTOPRAZOLE SODIUM, VIA: HCPCS | Performed by: INTERNAL MEDICINE

## 2022-10-03 PROCEDURE — 83735 ASSAY OF MAGNESIUM: CPT

## 2022-10-03 PROCEDURE — 99221 1ST HOSP IP/OBS SF/LOW 40: CPT | Performed by: INTERNAL MEDICINE

## 2022-10-03 PROCEDURE — 36415 COLL VENOUS BLD VENIPUNCTURE: CPT

## 2022-10-03 PROCEDURE — 71046 X-RAY EXAM CHEST 2 VIEWS: CPT

## 2022-10-03 PROCEDURE — 85025 COMPLETE CBC W/AUTO DIFF WBC: CPT

## 2022-10-03 RX ORDER — MECOBALAMIN 5000 MCG
5 TABLET,DISINTEGRATING ORAL NIGHTLY
Status: DISCONTINUED | OUTPATIENT
Start: 2022-10-03 | End: 2022-10-07 | Stop reason: HOSPADM

## 2022-10-03 RX ORDER — TRAZODONE HYDROCHLORIDE 50 MG/1
50 TABLET ORAL ONCE
Status: COMPLETED | OUTPATIENT
Start: 2022-10-03 | End: 2022-10-03

## 2022-10-03 RX ORDER — TRAZODONE HYDROCHLORIDE 50 MG/1
50 TABLET ORAL NIGHTLY PRN
Status: DISCONTINUED | OUTPATIENT
Start: 2022-10-03 | End: 2022-10-07 | Stop reason: HOSPADM

## 2022-10-03 RX ORDER — MAGNESIUM SULFATE IN WATER 40 MG/ML
4000 INJECTION, SOLUTION INTRAVENOUS ONCE
Status: COMPLETED | OUTPATIENT
Start: 2022-10-03 | End: 2022-10-03

## 2022-10-03 RX ADMIN — Medication 100 MG: at 11:16

## 2022-10-03 RX ADMIN — POTASSIUM BICARBONATE 40 MEQ: 782 TABLET, EFFERVESCENT ORAL at 11:16

## 2022-10-03 RX ADMIN — MAGNESIUM SULFATE HEPTAHYDRATE 4000 MG: 40 INJECTION, SOLUTION INTRAVENOUS at 11:25

## 2022-10-03 RX ADMIN — THERA TABS 1 TABLET: TAB at 11:16

## 2022-10-03 RX ADMIN — SODIUM CHLORIDE, PRESERVATIVE FREE 40 MG: 5 INJECTION INTRAVENOUS at 11:16

## 2022-10-03 RX ADMIN — CIPROFLOXACIN 400 MG: 2 INJECTION, SOLUTION INTRAVENOUS at 05:51

## 2022-10-03 RX ADMIN — METRONIDAZOLE 500 MG: 500 INJECTION, SOLUTION INTRAVENOUS at 04:38

## 2022-10-03 RX ADMIN — CIPROFLOXACIN 400 MG: 2 INJECTION, SOLUTION INTRAVENOUS at 18:04

## 2022-10-03 RX ADMIN — Medication 10 ML: at 21:17

## 2022-10-03 RX ADMIN — LORAZEPAM 1 MG: 1 TABLET ORAL at 21:23

## 2022-10-03 RX ADMIN — SODIUM CHLORIDE: 9 INJECTION, SOLUTION INTRAVENOUS at 21:17

## 2022-10-03 RX ADMIN — TRAZODONE HYDROCHLORIDE 50 MG: 50 TABLET ORAL at 21:17

## 2022-10-03 RX ADMIN — SODIUM CHLORIDE: 9 INJECTION, SOLUTION INTRAVENOUS at 00:56

## 2022-10-03 RX ADMIN — Medication 10 ML: at 11:20

## 2022-10-03 RX ADMIN — METRONIDAZOLE 500 MG: 500 INJECTION, SOLUTION INTRAVENOUS at 21:27

## 2022-10-03 RX ADMIN — SODIUM CHLORIDE: 9 INJECTION, SOLUTION INTRAVENOUS at 11:17

## 2022-10-03 RX ADMIN — TRAZODONE HYDROCHLORIDE 50 MG: 50 TABLET ORAL at 00:48

## 2022-10-03 ASSESSMENT — PAIN - FUNCTIONAL ASSESSMENT: PAIN_FUNCTIONAL_ASSESSMENT: ACTIVITIES ARE NOT PREVENTED

## 2022-10-03 ASSESSMENT — PAIN SCALES - GENERAL
PAINLEVEL_OUTOF10: 0
PAINLEVEL_OUTOF10: 4

## 2022-10-03 ASSESSMENT — PAIN DESCRIPTION - LOCATION: LOCATION: ABDOMEN

## 2022-10-03 ASSESSMENT — PAIN DESCRIPTION - ORIENTATION: ORIENTATION: MID

## 2022-10-03 NOTE — CONSULTS
Inpatient consult to GI  Consult performed by: Fanta Dc MD  Consult ordered by: [facility],         Patient Name: Lisa Reilly Date: 10/2/2022 12:40 PM  MR #: [id number]  : 1970    Attending Physician: Eric Campbell DO  Reason for consult: alcoholic hepatitis    History of Presenting Illness:      Arnoldo Freitas is a 46 y.o. male on hospital day 1 with a history of hypertension and cholelithiasis. Past surgical history significant for tonsillectomy, rhinoplasty, and laparoscopic cholecystectomy. Family history is negative for GI malignancies. Social history patient reports current nicotine use, current alcohol use, no illicit drug use. History Obtained From:  patient, electronic medical record  GI consult for alcoholic hepatitis-patient was admitted with abdominal pain, specifically epigastric started about a week ago, also noted yellowing of his skin and dark urine. Patient has a history of alcohol use most of his adult life has had periods of sobriety however started drinking heavily in  and after the death of his father, drinks approximately 10-15 beers daily. Has been told in the past that he has liver disease. On arrival was found to have bilirubin 12.1, with abnormal transaminase >3 x normal in pattern c/w alcoholic liver disease, with mild less than 1-1/2 times normal alkaline phosphatase. Noted to have hypoalbuminemia and significant thrombocytopenia with platelets of 55 and coagulopathy with INR of 1.8. No nausea or vomiting, hematemesis, melena, or hematochezia. CT of the abdomen was notable for hepatic steatosis, ultrasound of the abdomen noted hepatic steatosis, no liver lesions, no ascites and CBD of 3 mm postcholecystectomy.   History:      Past Medical History:   Diagnosis Date    Chest pressure 3/8/2016    Cholelithiasis     Chronic alcoholic hepatitis     History of alcohol use     Hypertension 3/8/2016     Past Surgical History:   Procedure Laterality Date CYST REMOVAL      [place] LAP,CHOLECYSTECTOMY/GRAPH N/A 2/19/2018    LAPAROSCOPIC CHOLECYSTECTOMY WITH GRAMS performed by Areli Corley MD at [facility]      Deviated Septum    TONSILLECTOMY       Family History  Family History   Problem Relation Age of Onset    Alcohol Abuse Father     Diabetes Brother     Heart Attack Paternal Grandfather     Crohn's Disease Maternal Aunt     Colon Cancer Neg Hx     Celiac Disease Neg Hx      [] Unable to obtain due to ventilated and/ or neurologic status  Social History     Socioeconomic History    Marital status:      Spouse name: Not on file    Number of children: Not on file    Years of education: Not on file    Highest education level: Not on file   Occupational History    Not on file   Tobacco Use    Smoking status: Every Day     Packs/day: 1.00     Types: Cigarettes    Smokeless tobacco: Never   Substance and Sexual Activity    Alcohol use: Not Currently    Drug use: No    Sexual activity: Not on file   Other Topics Concern    Not on file   Social History Narrative    Not on file     Social Determinants of Health     Financial Resource Strain: Not on file   Food Insecurity: Not on file   Transportation Needs: Not on file   Physical Activity: Not on file   Stress: Not on file   Social Connections: Not on file   Intimate Partner Violence: Not on file   Housing Stability: Not on file      [] Unable to obtain due to ventilated and/ or neurologic status    Home Medications:      Medications Prior to Admission: Milk Thistle 300 MG CAPS, Take 300 mg by mouth daily  [DISCONTINUED] linaclotide (LINZESS) 145 MCG capsule, Take 1 capsule by mouth every morning (before breakfast) (Patient not taking: Reported on 10/2/2022)  Multiple Vitamin (MULTI VITAMIN PO), Take by mouth  PROAIR  (90 Base) MCG/ACT inhaler, INHALE 2 PUFFS BY MOUTH 4 TIMES DAILY AS NEEDED FOR SHORTNESS OF BREATH.     Current Hospital Medications:   Scheduled Meds:   melatonin  5 mg Oral Nightly sodium chloride flush  5-40 mL IntraVENous 2 times per day    pantoprazole (PROTONIX) 40 mg injection  40 mg IntraVENous Daily    metroNIDAZOLE  500 mg IntraVENous Q8H    ciprofloxacin  400 mg IntraVENous Q12H    thiamine  100 mg Oral Daily    multivitamin  1 tablet Oral Daily     Continuous Infusions:   sodium chloride      sodium chloride 125 mL/hr at 10/03/22 0056     PRN Meds:.sodium chloride flush, sodium chloride, ondansetron **OR** ondansetron, LORazepam **OR** LORazepam **OR** LORazepam **OR** LORazepam **OR** LORazepam **OR** LORazepam **OR** LORazepam **OR** LORazepam   sodium chloride      sodium chloride 125 mL/hr at 10/03/22 0056      Allergies: Allergies   Allergen Reactions    Acetaminophen Other (See Comments)     Liver issues  intolerance due to Liver      Review of Systems:       [x] CV, Resp, Neuro, , and all other systems reviewed and negative other than listed in HPI. Objective Findings:     Vitals:   Vitals:    10/02/22 1700 10/02/22 2157 10/02/22 2224 10/03/22 0549   BP: 136/85 114/78 114/78 (!) 99/58   Pulse: 86 93 93 81   Resp: 14 16 16 16   Temp: 98.2 °F (36.8 °C) 98.8 °F (37.1 °C)     TempSrc: Oral Oral     SpO2: 100% 96%  98%   Weight:       Height:            Physical Examination:  General: alert  HEENT: Normocephalic, + scleral icterus. Neck: No JVD. Heart: Regular, no murmur, no rub/gallop. No RV heave. Lungs: Clear to ascultation, no rales/wheezing/rhonchi. Good chest wall excursion. Abdomen: Appearance:, no Distension , Soft , epigastric tenderness , Scars , Bowel sounds normal  Extremities: No clubbing/cyanosis, no edema. Skin: jaundiced, Warm, dry, normal turgor, no rash, no bruise, no petichiae. Neuro: No myoclonus or tremor.    Psych: Normal affect    Results/ Medications reviewed 10/3/2022, 8:23 AM     Laboratory, Microbiology, Pathology, Radiology, Cardiology, Medications and Transcriptions reviewed  Scheduled Meds:   melatonin  5 mg Oral Nightly sodium chloride flush  5-40 mL IntraVENous 2 times per day    pantoprazole (PROTONIX) 40 mg injection  40 mg IntraVENous Daily    metroNIDAZOLE  500 mg IntraVENous Q8H    ciprofloxacin  400 mg IntraVENous Q12H    thiamine  100 mg Oral Daily    multivitamin  1 tablet Oral Daily     Continuous Infusions:   sodium chloride      sodium chloride 125 mL/hr at 10/03/22 0056       Recent Labs     10/02/22  1345 10/03/22  0440   WBC 4.1* 4.2*   HGB 13.2* 10.6*   HCT 38.4* 30.5*   .6* 105.1*   PLT 70* 55*     Recent Labs     10/02/22  1300 10/03/22  0440    141   K 2.9* 3.0*   CL 98 104   CO2 34* 28   BUN 4* 5*   CREATININE 0.47* 0.20*     Recent Labs     10/02/22  1300 10/03/22  0440   * 204*   ALT 82* 67*   BILITOT 12.5* 12.1*   ALKPHOS 171* 149*     Recent Labs     10/02/22  1300   LIPASE 80     Recent Labs     10/02/22  1300 10/02/22  1604 10/03/22  0440   PROT 7.1  --  5.9*   INR  --  1.7 1.8     CT ABDOMEN PELVIS WO CONTRAST Additional Contrast? None    Result Date: 10/2/2022  EXAMINATION: CT OF THE ABDOMEN AND PELVIS WITHOUT CONTRAST 10/2/2022 1:56 pm TECHNIQUE: CT of the abdomen and pelvis was performed without the administration of intravenous contrast. Multiplanar reformatted images are provided for review. Automated exposure control, iterative reconstruction, and/or weight based adjustment of the mA/kV was utilized to reduce the radiation dose to as low as reasonably achievable. COMPARISON: General [id number]. HISTORY: ORDERING SYSTEM PROVIDED HISTORY: ab pain TECHNOLOGIST PROVIDED HISTORY: Additional Contrast?->None Reason for exam:->ab pain Decision Support Exception - unselect if not a suspected or confirmed emergency medical condition->Emergency Medical Condition (MA) What reading provider will be dictating this exam?->CRC FINDINGS: Lower Chest: Limited evaluation of the lower lung fields demonstrates unremarkable bronchovascular markings with no evidence of focal infiltrate or consolidation.  No evidence of acute cardiopulmonary disease is seen. Organs: The liver demonstrates decreased attenuation, no evidence of masses no evidence of intrahepatic biliary dilatation is seen. The gallbladder is surgically absent The common bile duct is unremarkable. The pancreas and spleen demonstrate no evidence of masses. The adrenal glands demonstrate unremarkable contours with no evidence of masses. Mild stranding of the right perirenal planes and along the right pericolic gutter is visualized epicenter appears to be along the ventral aspect of the midpole of the right kidney visualized on coronal series 601, image 51 and axial series 2 image 85, no evidence of a well-defined mass is seen. No evidence of renal stones. No evidence of right hydronephrosis or hydroureter is seen. The left kidney demonstrates no evidence of mass, nervous of stones. GI/Bowel: Small hiatus hernia is seen. The stomach is unremarkable, no evidence of masses. No significant distention of the small and large bowel loops is visualized. The appendix is visualized and is unremarkable. Abundance of stool is visualized in the large bowel. Pelvis: The urinary bladder is not optimally distended. The prostate gland is unremarkable. No evidence of free fluid in the pelvis. No evidence of pelvic mass is seen. Vessels[de-identified] Atherosclerotic calcifications visualized in the abdominal/pelvic vessels. Peritoneum/Retroperitoneum: No evidence of free fluid or air within the peritoneal cavity. Bones/Soft Tissues: No evidence of lytic or sclerotic bone lesion is seen. Abdominal wall soft tissues are unremarkable. Degenerative changes of the lumbar spine visualized. Postop is an intended position of the L5 and S1 vertebral bodies with will disc spaces seen previous     Stranding of the peritoneal fat planes is visualized most prominent in the right perirenal fat planes and in the right paracolic gutter.  Cannot optimally evaluate for renal mass or focal inflammatory changes in the absence of intravenous contrast material. Hepatic steatosis. Degenerative joint changes. Impression:   47 y/o male admitted with epigastric pain x 1 week and alcoholic liver disease. drinks approximately 10-15 beers daily. Has been told in the past that he has liver disease. On arrival was found to have bilirubin 12.1, with abnormal transaminase >3 x normal in pattern c/w alcoholic liver disease, with mild less than 1-1/2 times normal alkaline phosphatase. Noted to have hypoalbuminemia and significant thrombocytopenia with platelets of 55 and coagulopathy with INR of 1.8. No nausea or vomiting, hematemesis, melena, or hematochezia. CT of the abdomen was notable for hepatic steatosis, ultrasound of the abdomen noted hepatic steatosis, no liver lesions, no ascites and CBD of 3 mm postcholecystectomy. Plan:   1- Alcoholic hepatitis VS Decompensated cirrhosis   - MELD 22 , Maddrey Discriminant Factor 42, No encephalopathy  - No active GI bleeding .   - EtOH cessation stressed to pt. Rec EtOH abuse counseling as outpatient  - Banana bag, thiamine, folate, trace elements. - Monitor for withdrawal  -Monitor electrolytes and replete as needed  -R/o infectious causes: UA with reflex culture, blood cultures, obtain CXR, given the Maddrey score he may benefit from steroids if infectious workup negative  -acute hepatitis panel is pending  -Monitor LFTs and INR daily  2- no Ascites:   2 gm NA diet  3-  EGD for Variceal surveillance and epigastric pain  No overt GIB, no hx of EGD, macrocytic anemia with hgb 10.6  -serial HH trend and transfuse accordingly  -EGD pending clinical course  4-  HCC screening:   Recent imaging Negative for liver mass  5-  No overt Hepatic encephalopathy   Comments: Thank you for allowing us to participate in the care of this patient. Will continue to follow. Please call if questions or concerns arise.     Electronically signed by Nauhn Green MD on 10/3/2022 at 8:23 AM    Please note this report has been partially produced using speech recognition software and may cause contain errors related to that system including grammar, punctuation and spelling as well as words and phrases that may seem inappropriate. If there are questions or concerns please feel free to contact me to clarify.

## 2022-10-03 NOTE — TELEPHONE ENCOUNTER
Received call from patient Sunday stating that he noticed he was jaundice, able to visualize it mainly in his eyes but also all over face. Also with c/o dark urine in AM. Pt states he has \"not been drinking that much\". Advised ER to patient and wife who were agreeable to go to ER.

## 2022-10-03 NOTE — PROGRESS NOTES
Progress Note  Date:10/3/2022       [facility]:[id number]  Patient Name:Josue Saucedo     YOB: 1970     Age:52 y.o. Subjective      No acute events reported overnight. Did have some insomnia early in the night, barely qualified for the lowest dose Ativan per [facility] protocol which did not help with sleep. Nocturnist prescribed 50 mg p.o. trazodone which worked well. Patient requesting continue trazodone at bedtime for assistance with insomnia as needed. Patient complaining of epigastric tenderness with no significant RUQ component. Overall, tenderness decreasing. Bilirubin remains severely elevated. Morning hemoglobin dropped significantly from 13.2-10.6, though repeat hemoglobin at 1400 hrs. stable/slightly improved to 11.2. Platelets remain thrombocytopenic at 55 this morning, increasing to 60 2 in the afternoon. Morning potassium low at 3.0, replaced, improved to three-point 4 in the afternoon. Objective         Vitals Last 24 Hours:  TEMPERATURE:  Temp  Av.5 °F (36.9 °C)  Min: 98.2 °F (36.8 °C)  Max: 98.8 °F (37.1 °C)  RESPIRATIONS RANGE: Resp  Avg: 15.6  Min: 14  Max: 16  PULSE OXIMETRY RANGE: SpO2  Av.9 %  Min: 96 %  Max: 100 %  PULSE RANGE: Pulse  Av.8  Min: 78  Max: 93  BLOOD PRESSURE RANGE: Systolic (45EFV), RWV:650 , Min:99 , HEN:703   ; Diastolic (77GIU), NT, Min:58, Max:85    I/O (24Hr): Intake/Output Summary (Last 24 hours) at 10/3/2022 0849  Last data filed at 10/3/2022 0118  Gross per 24 hour   Intake 440 ml   Output --   Net 440 ml     Objective:  Vital signs: (most recent): Blood pressure (!) 99/58, pulse 81, temperature 98.8 °F (37.1 °C), temperature source Oral, resp. rate 16, height 6' (1.829 m), weight 190 lb (86.2 kg), SpO2 98 %. Constitutional: Adult male sitting up in bed no acute distress  Head: NCAT  Eyes: PERRLA, EOMI. No nystagmus appreciated. Mild scleral icterus present. ENT: Hearing grossly intact. No rhinorrhea.   Neck: Trachea midline, phonation normal  Cardiovascular: RRR. No significant murmurs appreciated. Warm and well perfused peripherally. Pulmonary: Normal rate and effort respiration on room air. Grossly CTA B. No coughing during exam.  Abdomen: Soft, nontense, possibly slightly distended. Epigastric tenderness to palpation. Negative RUQ tenderness to palpation. Neurologic: Alert, grossly oriented. No tremors appreciated. CN II through XII grossly intact. Fairly good historian. Conversational, mentating relatively appropriately at time of exam.  Psychiatric: Very mildly anxious. Cooperative with exam.  Integumentary: No gross bony abnormalities. Jaundice is present. Labs/Imaging/Diagnostics    Labs:  CBC:  Recent Labs     10/02/22  1345 10/03/22  0440   WBC 4.1* 4.2*   RBC 3.71* 2.91*   HGB 13.2* 10.6*   HCT 38.4* 30.5*   .6* 105.1*   RDW 14.2 14.2   PLT 70* 55*     CHEMISTRIES:  Recent Labs     10/02/22  1300 10/03/22  0440    141   K 2.9* 3.0*   CL 98 104   CO2 34* 28   BUN 4* 5*   CREATININE 0.47* 0.20*   GLUCOSE 119* 97   MG 1.1* 1.3*     PT/INR:  Recent Labs     10/02/22  1604 10/03/22  0440   PROTIME 19.8* 21.4*   INR 1.7 1.8     APTT:No results for input(s): APTT in the last 72 hours. LIVER PROFILE:  Recent Labs     10/02/22  1300 10/03/22  0440   * 204*   ALT 82* 67*   BILITOT 12.5* 12.1*   ALKPHOS 171* 149*       Imaging Last 24 Hours:  CT ABDOMEN PELVIS WO CONTRAST Additional Contrast? None    Result Date: 10/2/2022  EXAMINATION: CT OF THE ABDOMEN AND PELVIS WITHOUT CONTRAST 10/2/2022 1:56 pm TECHNIQUE: CT of the abdomen and pelvis was performed without the administration of intravenous contrast. Multiplanar reformatted images are provided for review. Automated exposure control, iterative reconstruction, and/or weight based adjustment of the mA/kV was utilized to reduce the radiation dose to as low as reasonably achievable. COMPARISON: General [id number].  HISTORY: ORDERING SYSTEM PROVIDED HISTORY: ab pain TECHNOLOGIST PROVIDED HISTORY: Additional Contrast?->None Reason for exam:->ab pain Decision Support Exception - unselect if not a suspected or confirmed emergency medical condition->Emergency Medical Condition (MA) What reading provider will be dictating this exam?->CRC FINDINGS: Lower Chest: Limited evaluation of the lower lung fields demonstrates unremarkable bronchovascular markings with no evidence of focal infiltrate or consolidation. No evidence of acute cardiopulmonary disease is seen. Organs: The liver demonstrates decreased attenuation, no evidence of masses no evidence of intrahepatic biliary dilatation is seen. The gallbladder is surgically absent The common bile duct is unremarkable. The pancreas and spleen demonstrate no evidence of masses. The adrenal glands demonstrate unremarkable contours with no evidence of masses. Mild stranding of the right perirenal planes and along the right pericolic gutter is visualized epicenter appears to be along the ventral aspect of the midpole of the right kidney visualized on coronal series 601, image 51 and axial series 2 image 85, no evidence of a well-defined mass is seen. No evidence of renal stones. No evidence of right hydronephrosis or hydroureter is seen. The left kidney demonstrates no evidence of mass, nervous of stones. GI/Bowel: Small hiatus hernia is seen. The stomach is unremarkable, no evidence of masses. No significant distention of the small and large bowel loops is visualized. The appendix is visualized and is unremarkable. Abundance of stool is visualized in the large bowel. Pelvis: The urinary bladder is not optimally distended. The prostate gland is unremarkable. No evidence of free fluid in the pelvis. No evidence of pelvic mass is seen. Vessels[de-identified] Atherosclerotic calcifications visualized in the abdominal/pelvic vessels. Peritoneum/Retroperitoneum: No evidence of free fluid or air within the peritoneal cavity. Bones/Soft Tissues: No evidence of lytic or sclerotic bone lesion is seen. Abdominal wall soft tissues are unremarkable. Degenerative changes of the lumbar spine visualized. Postop is an intended position of the L5 and S1 vertebral bodies with will disc spaces seen previous     Stranding of the peritoneal fat planes is visualized most prominent in the right perirenal fat planes and in the right paracolic gutter. Cannot optimally evaluate for renal mass or focal inflammatory changes in the absence of intravenous contrast material. Hepatic steatosis. Degenerative joint changes. Assessment//Plan           Hospital Problems             Last Modified POA    * (Principal) Acute alcoholic hepatitis 25/7/4630 Yes     Assessment & Plan    Acute alcoholic hepatitis  Jaundice due to above  Possible colitis  Coagulopathy-likely due to liver dysfunction  Hypokalemia  Hypomagnesemia  Alcohol abuse  Hypertension  Adjustment disorder     Plan  We will admit the patient to MedSurg floor  Start IV hydration  Banana bag  CIWA protocol  GI consult-Dr. Marjan Grande contacted in emergency room, recommended against steroids until infection is ruled out  Obtained abdominal ultrasound  Will treat with Cipro and Flagyl for possible colitis  Replete potassium and magnesium, recheck in the morning  IV Protonix  Daily INR  Daily CMP  Daily CBC  Discussed plan of care with patient and his wife at bedside  Home medication will be ordered as appropriate once reconciled by RN/pharmacy  DVT prophylaxis-SCDs, will hold off on anticoagulation given dark stools  10/3: Hemoglobin fell overnight from 13.2 to 10.6. Platelets fell from 49-05. FOBT ordered. Repeat labs at 1400 reassuring, Hgb, PLTs, and K all slightly improved. GI consulted on admission, appreciate recommendations when available. Continues on SCD for DVT prophylaxis in setting of reported dark stools on admission. FOBT remains pending.   OK to shower (patient requested), has been stable walking/standing per nursing. Trazodone 50mg PO QHS PRN sleep (started 10/2).       Electronically signed by William Fall DO on 10/3/22 at 8:49 AM EDT

## 2022-10-03 NOTE — CARE COORDINATION
[facility] CENTER AT [place] Case Management Initial Discharge Assessment    Met with Patient to discuss discharge plan. PCP: Mirtha Polanco MD                                  Date of Last Visit: 9/14/2021    [facility] Patient: No        [facility] Notified: no    If no PCP, list provided? N/A    Discharge Planning    Living Arrangements: independently at home    Who do you live with? Spouse     Who helps you with your care:  self    If lives at home:     Do you have any barriers navigating in your home? no    Patient can perform ADL? Yes    Current Services (outpatient and in home) :  None    Dialysis: No    Is transportation available to get to your appointments? Yes    DME Equipment:  no    Respiratory equipment: None    Respiratory provider:  no     Pharmacy:  yes - [address] [facility] with Medication Assistance Program?  No      Patient agreeable to [facility]? Declined    Patient agreeable to SNF/Rehab? Declined    Other discharge needs identified? N/A    Does Patient Have a High-Risk for Readmission Diagnosis (CHF, PN, MI, COPD)? No        Initial Discharge Plan? (Note: please see concurrent daily documentation for any updates after initial note). Met with pt at bedside to discuss discharge planning. Pt plans to return home with his spouse and denies d/c needs.      Readmission Risk              Risk of Unplanned Readmission:  9         Electronically signed by Deana Diggs RN on 10/3/2022 at 2:48 PM

## 2022-10-03 NOTE — PROGRESS NOTES
Comprehensive Nutrition Assessment    Type and Reason for Visit:  Initial, Positive Nutrition Screen    Nutrition Recommendations/Plan:   Obtain actual weight  Trial fortified pudding supplement @ lunch, frozen supplement at dinner  Continue regular diet     Malnutrition Assessment:  Malnutrition Status: At risk for malnutrition (Comment) (10/03/22 1018)    Context:  Social/Environmental Circumstances     Findings of the 6 clinical characteristics of malnutrition:  Energy Intake:  50% or less estimated energy requirements for 1 month or longer  Weight Loss:  Unable to assess (limited bed scale wt hx)     Body Fat Loss:  No significant body fat loss     Muscle Mass Loss:  No significant muscle mass loss    Fluid Accumulation:  Unable to assess     Strength:  Not Performed    Nutrition Assessment:    Pt at risk for malnutrition with poor po intake x 2 months and reported wt loss. Unable to confirm wt loss d/t limited bed scale weights available in EMR. Continue regular diet and trial nutrition supplements. Nutrition Related Findings:    Pt presents with Acute alcoholic hepatitis, with abd pain and jaundice. Pmhx: cholelithiasis, alcohol abuse, HTN. Reported 15lb wt loss, poor appetite and intakes x 2 months since father passing. Last BM pta. NS @125ml/hr. No edema noted. Labs (10/3): K=3.0; Bun/Cr=5/1.20; Mg=1.3; Gluc=97/119; elevated bilirubin and LFTs. Meds include electrolyte replacement. Wound Type: None       Current Nutrition Intake & Therapies:    Average Meal Intake: 1-25%  Average Supplements Intake: None Ordered  ADULT DIET;  Regular    Anthropometric Measures:  Height: 6' (182.9 cm)  Ideal Body Weight (IBW): 178 lbs (81 kg)    Admission Body Weight: 190 lb (86.2 kg) (stated 10/2)  Current Body Weight: 190 lb (86.2 kg) (10/2),    Weight Source: Stated  Current BMI (kg/m2): 25.8  Usual Body Weight: 188 lb (85.3 kg) (9/2021 bed)  % Weight Change (Calculated): 1.1                    BMI Categories: Overweight (BMI 25.0-29. 9)    Estimated Daily Nutrient Needs:  Energy Requirements Based On: Kcal/kg  Weight Used for Energy Requirements: Current  Energy (kcal/day): 1725-1900kcal (20-22kcal/kg)  Weight Used for Protein Requirements: Ideal  Protein (g/day): 97-105g (1.2-1.3g/kg)  Method Used for Fluid Requirements: 1 ml/kcal  Fluid (ml/day): ~1900ml    Nutrition Diagnosis:   Inadequate oral intake related to psychological cause or life stress as evidenced by poor intake prior to admission, intake 0-25%, weight loss    Nutrition Interventions:   Food and/or Nutrient Delivery: Start Oral Nutrition Supplement, Continue Current Diet  Nutrition Education/Counseling: No recommendation at this time  Coordination of Nutrition Care: Continue to monitor while inpatient       Goals:     Goals: PO intake 50% or greater, other (specify)  Specify Other Goals: maintainn weight    Nutrition Monitoring and Evaluation:   Behavioral-Environmental Outcomes: None Identified  Food/Nutrient Intake Outcomes: Food and Nutrient Intake, Supplement Intake, IVF Intake  Physical Signs/Symptoms Outcomes: Biochemical Data, Weight, Meal Time Behavior    Discharge Planning:     Too soon to determine     Simuel Boxer, MS, RD, LD  Contact:

## 2022-10-03 NOTE — PLAN OF CARE
Nutrition Problem #1: Inadequate oral intake  Intervention: Food and/or Nutrient Delivery: Start Oral Nutrition Supplement, Continue Current Diet  Nutritional    Goal: PO intake>50%. Maintain wt. Mateo Lyn

## 2022-10-04 LAB
ALBUMIN SERPL-MCNC: 2.5 G/DL (ref 3.5–4.6)
ALP BLD-CCNC: 153 U/L (ref 35–104)
ALT SERPL-CCNC: 60 U/L (ref 0–41)
AMMONIA: 70 UMOL/L (ref 16–60)
ANION GAP SERPL CALCULATED.3IONS-SCNC: 7 MEQ/L (ref 9–15)
ANISOCYTOSIS: ABNORMAL
AST SERPL-CCNC: 158 U/L (ref 0–40)
BASOPHILS ABSOLUTE: 0.1 K/UL (ref 0–0.2)
BASOPHILS RELATIVE PERCENT: 2 %
BILIRUB SERPL-MCNC: 14.7 MG/DL (ref 0.2–0.7)
BILIRUB SERPL-MCNC: 17.2 MG/DL (ref 0.2–0.7)
BILIRUBIN DIRECT: 12.2 MG/DL (ref 0–0.4)
BILIRUBIN, INDIRECT: 5 MG/DL (ref 0–0.6)
BUN BLDV-MCNC: 5 MG/DL (ref 6–20)
CALCIUM SERPL-MCNC: 8.1 MG/DL (ref 8.5–9.9)
CHLORIDE BLD-SCNC: 104 MEQ/L (ref 95–107)
CO2: 29 MEQ/L (ref 20–31)
CREAT SERPL-MCNC: 0.26 MG/DL (ref 0.7–1.2)
EOSINOPHILS ABSOLUTE: 0.1 K/UL (ref 0–0.7)
EOSINOPHILS RELATIVE PERCENT: 2 %
GFR AFRICAN AMERICAN: >60
GFR NON-AFRICAN AMERICAN: >60
GLOBULIN: 3.2 G/DL (ref 2.3–3.5)
GLUCOSE BLD-MCNC: 115 MG/DL (ref 70–99)
HCT VFR BLD CALC: 29.3 % (ref 42–52)
HEMOGLOBIN: 10.1 G/DL (ref 14–18)
HYPOCHROMIA: ABNORMAL
INR BLD: 2
LYMPHOCYTES ABSOLUTE: 0.5 K/UL (ref 1–4.8)
LYMPHOCYTES RELATIVE PERCENT: 13 %
MACROCYTES: ABNORMAL
MAGNESIUM: 1.6 MG/DL (ref 1.7–2.4)
MCH RBC QN AUTO: 36.3 PG (ref 27–31.3)
MCHC RBC AUTO-ENTMCNC: 34.5 % (ref 33–37)
MCV RBC AUTO: 105.4 FL (ref 80–100)
MONOCYTES ABSOLUTE: 0.3 K/UL (ref 0.2–0.8)
MONOCYTES RELATIVE PERCENT: 8.5 %
NEUTROPHILS ABSOLUTE: 2.8 K/UL (ref 1.4–6.5)
NEUTROPHILS RELATIVE PERCENT: 73 %
PDW BLD-RTO: 14.5 % (ref 11.5–14.5)
PHOSPHORUS: 2 MG/DL (ref 2.3–4.8)
PLATELET # BLD: 60 K/UL (ref 130–400)
PLATELET SLIDE REVIEW: ABNORMAL
POTASSIUM SERPL-SCNC: 3.4 MEQ/L (ref 3.4–4.9)
PROTHROMBIN TIME: 23 SEC (ref 12.3–14.9)
RBC # BLD: 2.78 M/UL (ref 4.7–6.1)
SODIUM BLD-SCNC: 140 MEQ/L (ref 135–144)
TARGET CELLS: ABNORMAL
TOTAL PROTEIN: 5.7 G/DL (ref 6.3–8)
WBC # BLD: 3.8 K/UL (ref 4.8–10.8)

## 2022-10-04 PROCEDURE — 2500000003 HC RX 250 WO HCPCS: Performed by: INTERNAL MEDICINE

## 2022-10-04 PROCEDURE — 84100 ASSAY OF PHOSPHORUS: CPT

## 2022-10-04 PROCEDURE — 6370000000 HC RX 637 (ALT 250 FOR IP): Performed by: INTERNAL MEDICINE

## 2022-10-04 PROCEDURE — 99233 SBSQ HOSP IP/OBS HIGH 50: CPT | Performed by: NURSE PRACTITIONER

## 2022-10-04 PROCEDURE — 2580000003 HC RX 258: Performed by: INTERNAL MEDICINE

## 2022-10-04 PROCEDURE — 85610 PROTHROMBIN TIME: CPT

## 2022-10-04 PROCEDURE — 6360000002 HC RX W HCPCS: Performed by: INTERNAL MEDICINE

## 2022-10-04 PROCEDURE — 83735 ASSAY OF MAGNESIUM: CPT

## 2022-10-04 PROCEDURE — 82248 BILIRUBIN DIRECT: CPT

## 2022-10-04 PROCEDURE — 82140 ASSAY OF AMMONIA: CPT

## 2022-10-04 PROCEDURE — 85025 COMPLETE CBC W/AUTO DIFF WBC: CPT

## 2022-10-04 PROCEDURE — 2060000000 HC ICU INTERMEDIATE R&B

## 2022-10-04 PROCEDURE — 6370000000 HC RX 637 (ALT 250 FOR IP): Performed by: NURSE PRACTITIONER

## 2022-10-04 PROCEDURE — 80053 COMPREHEN METABOLIC PANEL: CPT

## 2022-10-04 PROCEDURE — C9113 INJ PANTOPRAZOLE SODIUM, VIA: HCPCS | Performed by: INTERNAL MEDICINE

## 2022-10-04 PROCEDURE — A4216 STERILE WATER/SALINE, 10 ML: HCPCS | Performed by: INTERNAL MEDICINE

## 2022-10-04 PROCEDURE — 82247 BILIRUBIN TOTAL: CPT

## 2022-10-04 PROCEDURE — 36415 COLL VENOUS BLD VENIPUNCTURE: CPT

## 2022-10-04 RX ORDER — PREDNISOLONE SODIUM PHOSPHATE 15 MG/5ML
40 SOLUTION ORAL DAILY
Status: DISCONTINUED | OUTPATIENT
Start: 2022-10-04 | End: 2022-10-04

## 2022-10-04 RX ORDER — PREDNISOLONE 15 MG/5 ML
40 SOLUTION, ORAL ORAL DAILY
Status: DISCONTINUED | OUTPATIENT
Start: 2022-10-04 | End: 2022-10-04 | Stop reason: ALTCHOICE

## 2022-10-04 RX ORDER — PREDNISONE 10 MG/1
40 TABLET ORAL DAILY
Status: DISCONTINUED | OUTPATIENT
Start: 2022-10-04 | End: 2022-10-07 | Stop reason: HOSPADM

## 2022-10-04 RX ORDER — MAGNESIUM SULFATE IN WATER 40 MG/ML
4000 INJECTION, SOLUTION INTRAVENOUS ONCE
Status: COMPLETED | OUTPATIENT
Start: 2022-10-04 | End: 2022-10-05

## 2022-10-04 RX ORDER — LACTULOSE 10 G/15ML
20 SOLUTION ORAL 3 TIMES DAILY
Status: DISCONTINUED | OUTPATIENT
Start: 2022-10-04 | End: 2022-10-07 | Stop reason: HOSPADM

## 2022-10-04 RX ADMIN — LACTULOSE 20 G: 20 SOLUTION ORAL at 21:16

## 2022-10-04 RX ADMIN — METRONIDAZOLE 500 MG: 500 INJECTION, SOLUTION INTRAVENOUS at 05:31

## 2022-10-04 RX ADMIN — Medication 100 MG: at 10:32

## 2022-10-04 RX ADMIN — LORAZEPAM 2 MG: 2 INJECTION, SOLUTION INTRAMUSCULAR; INTRAVENOUS at 21:24

## 2022-10-04 RX ADMIN — POTASSIUM BICARBONATE 40 MEQ: 782 TABLET, EFFERVESCENT ORAL at 10:27

## 2022-10-04 RX ADMIN — METRONIDAZOLE 500 MG: 500 INJECTION, SOLUTION INTRAVENOUS at 14:33

## 2022-10-04 RX ADMIN — SODIUM CHLORIDE, PRESERVATIVE FREE 40 MG: 5 INJECTION INTRAVENOUS at 10:32

## 2022-10-04 RX ADMIN — POTASSIUM PHOSPHATE, MONOBASIC AND POTASSIUM PHOSPHATE, DIBASIC 30 MMOL: 224; 236 INJECTION, SOLUTION, CONCENTRATE INTRAVENOUS at 19:07

## 2022-10-04 RX ADMIN — LORAZEPAM 1 MG: 1 TABLET ORAL at 10:58

## 2022-10-04 RX ADMIN — LORAZEPAM 1 MG: 1 TABLET ORAL at 05:44

## 2022-10-04 RX ADMIN — THERA TABS 1 TABLET: TAB at 10:27

## 2022-10-04 RX ADMIN — Medication 10 ML: at 21:17

## 2022-10-04 RX ADMIN — MAGNESIUM SULFATE HEPTAHYDRATE 4000 MG: 40 INJECTION, SOLUTION INTRAVENOUS at 19:09

## 2022-10-04 RX ADMIN — CIPROFLOXACIN 400 MG: 2 INJECTION, SOLUTION INTRAVENOUS at 05:33

## 2022-10-04 RX ADMIN — SODIUM CHLORIDE: 9 INJECTION, SOLUTION INTRAVENOUS at 05:46

## 2022-10-04 RX ADMIN — SODIUM CHLORIDE: 9 INJECTION, SOLUTION INTRAVENOUS at 14:29

## 2022-10-04 RX ADMIN — PREDNISONE 40 MG: 10 TABLET ORAL at 18:59

## 2022-10-04 NOTE — PROGRESS NOTES
Gastroenterology Progress Note    Soto Salmeron is a 46 y.o. male patient. Hospitalization Day:2    Chief C/O: alcoholic hepatitis    SUBJECTIVE: Seen and examined, overnight events noted, was initiated on trazodone for sleep disturbances, no abdominal pain, tolerating a diet. ROS:  Gastrointestinal ROS: no abdominal pain, change in bowel habits, or black or bloody stools    Physical    VITALS:  /65   Pulse 80   Temp 97.7 °F (36.5 °C) (Oral)   Resp 18   Ht 6' (1.829 m)   Wt 192 lb 12.8 oz (87.5 kg)   SpO2 96%   BMI 26.15 kg/m²   TEMPERATURE:  Current - Temp: 97.7 °F (36.5 °C); Max - Temp  Av °F (36.7 °C)  Min: 97.7 °F (36.5 °C)  Max: 98.4 °F (36.9 °C)    General:  Alert and oriented,  No apparent distress  Skin- with jaundice  Eyes: icteric sclera  Cardiac: RRR, Nl s1s2, without murmurs  Lungs CTA Bilaterally, normal effort  Abdomen soft, ND, NT, no HSM, Bowel sounds normal  Ext: without edema  Neuro: no asterixis     Data    Data Review:    Recent Labs     10/03/22  0440 10/03/22  1352 10/04/22  0529   WBC 4.2* 3.8* 3.8*   HGB 10.6* 11.2* 10.1*   HCT 30.5* 32.4* 29.3*   .1* 104.7* 105.4*   PLT 55* 62* 60*     Recent Labs     10/02/22  1300 10/03/22  0440 10/03/22  1352 10/04/22  0529    141 139 140   K 2.9* 3.0* 3.4  --    CL 98 104 101 104   CO2 34* 28 30 29   PHOS  --   --   --  2.0*   BUN 4* 5* 5* 5*   CREATININE 0.47* 0.20* 0.29* 0.26*     Recent Labs     10/02/22  1300 10/03/22  0440 10/04/22  0529   * 204* 158*   ALT 82* 67* 60*   BILITOT 12.5* 12.1* 14.7*   ALKPHOS 171* 149* 153*     Recent Labs     10/02/22  1300   LIPASE 80     Recent Labs     10/02/22  1604 10/03/22  0440 10/04/22  0529   PROTIME 19.8* 21.4* 23.0*   INR 1.7 1.8 2.0           ASSESSMENT:  45 y/o male admitted with epigastric pain x 1 week and alcoholic liver disease. drinks approximately 10-15 beers daily. Has been told in the past that he has liver disease.   On arrival was found to have bilirubin 12.1, with abnormal transaminase >3 x normal in pattern c/w alcoholic liver disease, with mild less than 1-1/2 times normal alkaline phosphatase. Noted to have hypoalbuminemia and significant thrombocytopenia with platelets of 55 and coagulopathy with INR of 1.8. No nausea or vomiting, hematemesis, melena, or hematochezia. CT of the abdomen was notable for hepatic steatosis, ultrasound of the abdomen noted hepatic steatosis, no liver lesions, no ascites and CBD of 3 mm postcholecystectomy. PLAN :  1- Alcoholic hepatitis VS Decompensated cirrhosis   - MELD 22 , Maddrey Discriminant Factor 42, No encephalopathy  - No active GI bleeding .   - EtOH cessation stressed to pt. Rec EtOH abuse counseling as outpatient  - Banana bag, thiamine, folate, trace elements. - Monitor for withdrawal  -Monitor electrolytes and replete as needed  -R/o infectious causes: obtain UA with reflex culture, blood cultures pending., CXR negative  -acute hepatitis panel is negative  -Monitor LFTs and INR daily  2- no Ascites:   2 gm NA diet  3-  EGD for Variceal surveillance and epigastric pain  No overt GIB, no hx of EGD, macrocytic anemia with hgb 10.1, INR 2.0  -serial HH trend and transfuse accordingly  -EGD pending clinical course  4-  HCC screening:   Recent imaging Negative for liver mass  5-  No overt Hepatic encephalopathy   6- abdominal pain & CT imaging notable for ? Right sided colitis  Symptoms are resolved pt is tolerating diet  Patient was initiated on IV Flagyl and Cipro for questionable right-sided colitis, less likely colitis and more likely congestive colopathy from liver disease.   -stop Atbx  - DF 42, start prednisolone 40 mg without NAC for 7 days, then calculate Lille score. If Lille score greater than 0.45, stop treatment (indicates 6 month survival is 25%). If Lille score less than 0.45, continue for 28 days with 2 week taper      Thank you for allowing me to participate in the care of your patient.   Please feel free to contact me with any concerns.     Louise Jha, APRN - CNP

## 2022-10-04 NOTE — PROGRESS NOTES
Progress Note  Date:10/4/2022       [facility]:[id number]  Patient Name:Josue Saucedo     YOB: 1970     Age:52 y.o. Subjective      Bilirubin slightly worsened this morning  Other LFTs stable to slightly improved. Per staff, patient slightly more irritable today. Spouse present at bedside during exam.    Objective         Vitals Last 24 Hours:  TEMPERATURE:  Temp  Av °F (36.7 °C)  Min: 97.7 °F (36.5 °C)  Max: 98.4 °F (36.9 °C)  RESPIRATIONS RANGE: Resp  Av  Min: 18  Max: 18  PULSE OXIMETRY RANGE: SpO2  Av.8 %  Min: 95 %  Max: 100 %  PULSE RANGE: Pulse  Av.4  Min: 73  Max: 85  BLOOD PRESSURE RANGE: Systolic (95JNK), SNZ:301 , Min:99 , CBL:135   ; Diastolic (93YHM), IJH:26, Min:56, Max:80    I/O (24Hr): Intake/Output Summary (Last 24 hours) at 10/4/2022 0832  Last data filed at 10/4/2022 0316  Gross per 24 hour   Intake 3487.45 ml   Output 2050 ml   Net 1437.45 ml       Objective:  Vital signs: (most recent): Blood pressure 110/79, pulse 72, temperature 98.2 °F (36.8 °C), temperature source Oral, resp. rate 18, height 6' (1.829 m), weight 192 lb 12.8 oz (87.5 kg), SpO2 100 %. Constitutional: Adult male sitting up in bed no acute distress. Spouse present at bedside. Head: NCAT  Eyes: PERRLA, EOMI. No nystagmus appreciated. Mild scleral icterus present. ENT: Hearing grossly intact. No rhinorrhea. Neck: Trachea midline, phonation normal  Cardiovascular: RRR. No significant murmurs appreciated. Warm and well perfused peripherally. Pulmonary: Normal rate and effort respiration on room air. Grossly CTAB. No coughing during exam.  Abdomen: Soft, nontense, possibly slightly distended. Neurologic: Alert, grossly oriented. No tremors appreciated. CN II through XII grossly intact. Fairly good historian. Conversational, mentating relatively appropriately at time of exam.  Psychiatric: Very mildly anxious.   Cooperative with exam.  Integumentary: No gross bony abnormalities. Jaundice is present. Labs/Imaging/Diagnostics    Labs:  CBC:  Recent Labs     10/03/22  0440 10/03/22  1352 10/04/22  0529   WBC 4.2* 3.8* 3.8*   RBC 2.91* 3.10* 2.78*   HGB 10.6* 11.2* 10.1*   HCT 30.5* 32.4* 29.3*   .1* 104.7* 105.4*   RDW 14.2 14.1 14.5   PLT 55* 62* 60*       CHEMISTRIES:  Recent Labs     10/02/22  1300 10/03/22  0440 10/03/22  1352 10/04/22  0529    141 139  --    K 2.9* 3.0* 3.4  --    CL 98 104 101  --    CO2 34* 28 30  --    BUN 4* 5* 5*  --    CREATININE 0.47* 0.20* 0.29*  --    GLUCOSE 119* 97 105*  --    PHOS  --   --   --  2.0*   MG 1.1* 1.3*  --  1.6*       PT/INR:  Recent Labs     10/02/22  1604 10/03/22  0440 10/04/22  0529   PROTIME 19.8* 21.4* 23.0*   INR 1.7 1.8 2.0       APTT:No results for input(s): APTT in the last 72 hours. LIVER PROFILE:  Recent Labs     10/02/22  1300 10/03/22  0440   * 204*   ALT 82* 67*   BILITOT 12.5* 12.1*   ALKPHOS 171* 149*         Imaging Last 24 Hours:  CT ABDOMEN PELVIS WO CONTRAST Additional Contrast? None    Result Date: 10/2/2022  EXAMINATION: CT OF THE ABDOMEN AND PELVIS WITHOUT CONTRAST 10/2/2022 1:56 pm TECHNIQUE: CT of the abdomen and pelvis was performed without the administration of intravenous contrast. Multiplanar reformatted images are provided for review. Automated exposure control, iterative reconstruction, and/or weight based adjustment of the mA/kV was utilized to reduce the radiation dose to as low as reasonably achievable. COMPARISON: General [id number].  HISTORY: ORDERING SYSTEM PROVIDED HISTORY: ab pain TECHNOLOGIST PROVIDED HISTORY: Additional Contrast?->None Reason for exam:->ab pain Decision Support Exception - unselect if not a suspected or confirmed emergency medical condition->Emergency Medical Condition (MA) What reading provider will be dictating this exam?->CRC FINDINGS: Lower Chest: Limited evaluation of the lower lung fields demonstrates unremarkable bronchovascular markings with no evidence of focal infiltrate or consolidation. No evidence of acute cardiopulmonary disease is seen. Organs: The liver demonstrates decreased attenuation, no evidence of masses no evidence of intrahepatic biliary dilatation is seen. The gallbladder is surgically absent The common bile duct is unremarkable. The pancreas and spleen demonstrate no evidence of masses. The adrenal glands demonstrate unremarkable contours with no evidence of masses. Mild stranding of the right perirenal planes and along the right pericolic gutter is visualized epicenter appears to be along the ventral aspect of the midpole of the right kidney visualized on coronal series 601, image 51 and axial series 2 image 85, no evidence of a well-defined mass is seen. No evidence of renal stones. No evidence of right hydronephrosis or hydroureter is seen. The left kidney demonstrates no evidence of mass, nervous of stones. GI/Bowel: Small hiatus hernia is seen. The stomach is unremarkable, no evidence of masses. No significant distention of the small and large bowel loops is visualized. The appendix is visualized and is unremarkable. Abundance of stool is visualized in the large bowel. Pelvis: The urinary bladder is not optimally distended. The prostate gland is unremarkable. No evidence of free fluid in the pelvis. No evidence of pelvic mass is seen. Vessels[de-identified] Atherosclerotic calcifications visualized in the abdominal/pelvic vessels. Peritoneum/Retroperitoneum: No evidence of free fluid or air within the peritoneal cavity. Bones/Soft Tissues: No evidence of lytic or sclerotic bone lesion is seen. Abdominal wall soft tissues are unremarkable. Degenerative changes of the lumbar spine visualized. Postop is an intended position of the L5 and S1 vertebral bodies with will disc spaces seen previous     Stranding of the peritoneal fat planes is visualized most prominent in the right perirenal fat planes and in the right paracolic gutter.  Cannot optimally evaluate for renal mass or focal inflammatory changes in the absence of intravenous contrast material. Hepatic steatosis. Degenerative joint changes. Assessment//Plan           Hospital Problems             Last Modified POA    * (Principal) Acute alcoholic hepatitis 53/9/6696 Yes   Assessment & Plan    Acute alcoholic hepatitis  Jaundice due to above  Possible colitis  Coagulopathy-likely due to liver dysfunction  Hypokalemia  Hypomagnesemia  Alcohol abuse  Hypertension  Adjustment disorder     Plan  We will admit the patient to MedSurg floor  Start IV hydration  Banana bag  CIWA protocol  GI consult-Dr. Yasmeen Morgan contacted in emergency room, recommended against steroids until infection is ruled out  Obtained abdominal ultrasound  Will treat with Cipro and Flagyl for possible colitis  Replete potassium and magnesium, recheck in the morning  IV Protonix  Daily INR  Daily CMP  Daily CBC  Discussed plan of care with patient and his wife at bedside  Home medication will be ordered as appropriate once reconciled by RN/pharmacy  DVT prophylaxis-SCDs, will hold off on anticoagulation given dark stools  10/3: Hemoglobin fell overnight from 13.2 to 10.6. Platelets fell from 15-39. FOBT ordered. Repeat labs at 1400 reassuring, Hgb, PLTs, and K all slightly improved. GI consulted on admission, appreciate recommendations when available. Continues on SCD for DVT prophylaxis in setting of reported dark stools on admission. FOBT remains pending. OK to shower (patient requested), has been stable walking/standing per nursing. Trazodone 50mg PO QHS PRN sleep (started 10/2). 10/4: Bilirubin somewhat worsened to 14.7. Other LFTs essentially stable to very slightly improved. We will obtain new fractionated bilirubin and check ammonia level. Per nursing, patient slightly more irritable today. Per GI, patient has crossed threshold into true cirrhosis status.       Electronically signed by Jaelyn Faustin DO on 10/4/2022 at 3:41 PM        Addendum:  Ammonia came back elevated at 70. Starting lactulose, titrate to goal 3 loose bowel movements per day. Fractionated bilirubin ordered still pending at present. Phosphorus resulted low at 2.0, will supplement with 30 mmol of K-Phos and monitor.     Electronically signed by Thomas Frazier DO on 10/4/2022 at 6:03 PM

## 2022-10-04 NOTE — FLOWSHEET NOTE
2130-HS assessment completed. Vitals stable on RA. NSR on tele. Pt scored 11 on CIWA. Medicated with 1mg PO ativan. Pt denies pain at this time. Call light in reach. Team to continue to monitor.  Electronically signed by Melvi Ballard RN on 10/3/2022 at 9:43 PM

## 2022-10-05 LAB
ALBUMIN SERPL-MCNC: 2.9 G/DL (ref 3.5–4.6)
ALP BLD-CCNC: 175 U/L (ref 35–104)
ALT SERPL-CCNC: 60 U/L (ref 0–41)
ANION GAP SERPL CALCULATED.3IONS-SCNC: 8 MEQ/L (ref 9–15)
ANISOCYTOSIS: ABNORMAL
AST SERPL-CCNC: 143 U/L (ref 0–40)
BASOPHILS ABSOLUTE: 0 K/UL (ref 0–0.2)
BASOPHILS RELATIVE PERCENT: 0.4 %
BILIRUB SERPL-MCNC: 18 MG/DL (ref 0.2–0.7)
BUN BLDV-MCNC: 4 MG/DL (ref 6–20)
CALCIUM SERPL-MCNC: 7.9 MG/DL (ref 8.5–9.9)
CHLORIDE BLD-SCNC: 101 MEQ/L (ref 95–107)
CO2: 25 MEQ/L (ref 20–31)
CREAT SERPL-MCNC: 0.47 MG/DL (ref 0.7–1.2)
EOSINOPHILS ABSOLUTE: 0 K/UL (ref 0–0.7)
EOSINOPHILS RELATIVE PERCENT: 1 %
GFR AFRICAN AMERICAN: >60
GFR NON-AFRICAN AMERICAN: >60
GLOBULIN: 3.5 G/DL (ref 2.3–3.5)
GLUCOSE BLD-MCNC: 160 MG/DL (ref 70–99)
HCT VFR BLD CALC: 31.9 % (ref 42–52)
HEMOGLOBIN: 11 G/DL (ref 14–18)
HYPOCHROMIA: ABNORMAL
INR BLD: 2
LYMPHOCYTES ABSOLUTE: 0.2 K/UL (ref 1–4.8)
LYMPHOCYTES RELATIVE PERCENT: 4 %
MACROCYTES: ABNORMAL
MAGNESIUM: 1.8 MG/DL (ref 1.7–2.4)
MCH RBC QN AUTO: 36.7 PG (ref 27–31.3)
MCHC RBC AUTO-ENTMCNC: 34.4 % (ref 33–37)
MCV RBC AUTO: 106.5 FL (ref 80–100)
MONOCYTES ABSOLUTE: 0.1 K/UL (ref 0.2–0.8)
MONOCYTES RELATIVE PERCENT: 2.9 %
NEUTROPHILS ABSOLUTE: 4 K/UL (ref 1.4–6.5)
NEUTROPHILS RELATIVE PERCENT: 92 %
PDW BLD-RTO: 14.8 % (ref 11.5–14.5)
PLATELET # BLD: 78 K/UL (ref 130–400)
PLATELET SLIDE REVIEW: ABNORMAL
POIKILOCYTES: ABNORMAL
POTASSIUM SERPL-SCNC: 3.6 MEQ/L (ref 3.4–4.9)
PROTHROMBIN TIME: 22.8 SEC (ref 12.3–14.9)
RBC # BLD: 2.99 M/UL (ref 4.7–6.1)
SODIUM BLD-SCNC: 134 MEQ/L (ref 135–144)
TARGET CELLS: ABNORMAL
TOTAL PROTEIN: 6.4 G/DL (ref 6.3–8)
WBC # BLD: 4.4 K/UL (ref 4.8–10.8)

## 2022-10-05 PROCEDURE — 6370000000 HC RX 637 (ALT 250 FOR IP): Performed by: INTERNAL MEDICINE

## 2022-10-05 PROCEDURE — 80053 COMPREHEN METABOLIC PANEL: CPT

## 2022-10-05 PROCEDURE — A4216 STERILE WATER/SALINE, 10 ML: HCPCS | Performed by: INTERNAL MEDICINE

## 2022-10-05 PROCEDURE — 2580000003 HC RX 258: Performed by: INTERNAL MEDICINE

## 2022-10-05 PROCEDURE — 36415 COLL VENOUS BLD VENIPUNCTURE: CPT

## 2022-10-05 PROCEDURE — 85610 PROTHROMBIN TIME: CPT

## 2022-10-05 PROCEDURE — 83735 ASSAY OF MAGNESIUM: CPT

## 2022-10-05 PROCEDURE — C9113 INJ PANTOPRAZOLE SODIUM, VIA: HCPCS | Performed by: INTERNAL MEDICINE

## 2022-10-05 PROCEDURE — 6360000002 HC RX W HCPCS: Performed by: INTERNAL MEDICINE

## 2022-10-05 PROCEDURE — 85025 COMPLETE CBC W/AUTO DIFF WBC: CPT

## 2022-10-05 PROCEDURE — 99233 SBSQ HOSP IP/OBS HIGH 50: CPT | Performed by: NURSE PRACTITIONER

## 2022-10-05 PROCEDURE — 2060000000 HC ICU INTERMEDIATE R&B

## 2022-10-05 PROCEDURE — 6370000000 HC RX 637 (ALT 250 FOR IP): Performed by: NURSE PRACTITIONER

## 2022-10-05 RX ADMIN — PREDNISONE 40 MG: 10 TABLET ORAL at 08:17

## 2022-10-05 RX ADMIN — POTASSIUM BICARBONATE 40 MEQ: 782 TABLET, EFFERVESCENT ORAL at 08:20

## 2022-10-05 RX ADMIN — LACTULOSE 20 G: 20 SOLUTION ORAL at 08:17

## 2022-10-05 RX ADMIN — LORAZEPAM 1 MG: 1 TABLET ORAL at 16:44

## 2022-10-05 RX ADMIN — LORAZEPAM 1 MG: 1 TABLET ORAL at 12:09

## 2022-10-05 RX ADMIN — LACTULOSE 20 G: 20 SOLUTION ORAL at 20:59

## 2022-10-05 RX ADMIN — SODIUM CHLORIDE: 9 INJECTION, SOLUTION INTRAVENOUS at 00:01

## 2022-10-05 RX ADMIN — TRAZODONE HYDROCHLORIDE 50 MG: 50 TABLET ORAL at 20:59

## 2022-10-05 RX ADMIN — SODIUM CHLORIDE, PRESERVATIVE FREE 40 MG: 5 INJECTION INTRAVENOUS at 08:18

## 2022-10-05 RX ADMIN — Medication 10 ML: at 21:01

## 2022-10-05 RX ADMIN — THERA TABS 1 TABLET: TAB at 08:17

## 2022-10-05 RX ADMIN — Medication 100 MG: at 08:17

## 2022-10-05 RX ADMIN — Medication 10 ML: at 08:17

## 2022-10-05 RX ADMIN — LACTULOSE 20 G: 20 SOLUTION ORAL at 16:05

## 2022-10-05 RX ADMIN — SODIUM CHLORIDE: 9 INJECTION, SOLUTION INTRAVENOUS at 16:09

## 2022-10-05 RX ADMIN — LORAZEPAM 1 MG: 1 TABLET ORAL at 20:59

## 2022-10-05 ASSESSMENT — PAIN - FUNCTIONAL ASSESSMENT: PAIN_FUNCTIONAL_ASSESSMENT: ACTIVITIES ARE NOT PREVENTED

## 2022-10-05 NOTE — PROGRESS NOTES
1900 Bedside report given, assumed care of pt   2120 Pt alert and orients but agitated, positive CIWA see CHARTING.   2209 Pt up and pacing in room, personal belongings packed on bed and clothes in the trash can. Some confusion pt stating he has to go to [place] and sell his house and can't be late. Pt was able to tell me his name and where he was, vitals stable pt redirected, fluids offered, positioned in bed call button in reach. 56 Pt I up walking in room with bag on confused stating he needs to leave this hotel pt reoriented and redirected. MD notified of increased confusion after Ativan for CIWA of 12.   0000 Pt alert and oriented, calm, cooperative with care. 0000 Pt confused stating he needs to get out f pt reoriented and redirected. 0600 Pt resting.   at bedside

## 2022-10-05 NOTE — PROGRESS NOTES
Gastroenterology Progress Note    Marine Marroquin is a 46 y.o. male patient. Hospitalization Day:3    Chief C/O: alcoholic hepatitis    SUBJECTIVE: Seen and examined, overnight events noted, patient became agitated and restless yesterday afternoon, was given Ativan per CIWA protocol, had ammonia level which was 70, was initiated on lactulose, currently calm and cooperative with 1:1 in place for safety    ROS:  Gastrointestinal ROS: no abdominal pain, change in bowel habits, or black or bloody stools    Physical    VITALS:  BP (!) 140/64   Pulse 78   Temp 97.7 °F (36.5 °C) (Oral)   Resp 18   Ht 6' (1.829 m)   Wt 192 lb 12.8 oz (87.5 kg)   SpO2 99%   BMI 26.15 kg/m²   TEMPERATURE:  Current - Temp: 97.7 °F (36.5 °C); Max - Temp  Av °F (36.7 °C)  Min: 97.7 °F (36.5 °C)  Max: 98.2 °F (36.8 °C)    General:  Alert and oriented  Skin- with jaundice  Eyes: icteric sclera  Cardiac: RRR, Nl s1s2, without murmurs  Lungs CTA Bilaterally, normal effort  Abdomen soft, ND, NT, no HSM, Bowel sounds normal  Ext: with edema  Neuro: no asterixis     Data    Data Review:    Recent Labs     10/03/22  1352 10/04/22  0529 10/05/22  0436   WBC 3.8* 3.8* 4.4*   HGB 11.2* 10.1* 11.0*   HCT 32.4* 29.3* 31.9*   .7* 105.4* 106.5*   PLT 62* 60* 78*     Recent Labs     10/03/22  1352 10/04/22  0529 10/05/22  0436    140 134*   K 3.4 3.4 3.6    104 101   CO2 30 29 25   PHOS  --  2.0*  --    BUN 5* 5* 4*   CREATININE 0.29* 0.26* 0.47*     Recent Labs     10/03/22  0440 10/04/22  0529 10/04/22  1427 10/05/22  043   * 158*  --  143*   ALT 67* 60*  --  60*   BILIDIR  --   --  12.2*  --    BILITOT 12.1* 14.7* 17.2* 18.0*   ALKPHOS 149* 153*  --  175*     Recent Labs     10/02/22  1300   LIPASE 80     Recent Labs     10/03/22  0440 10/04/22  0529 10/05/22  0436   PROTIME 21.4* 23.0* 22.8*   INR 1.8 2.0 2.0           ASSESSMENT:  45 y/o male admitted with epigastric pain x 1 week and alcoholic liver disease.  drinks approximately 10-15 beers daily. Has been told in the past that he has liver disease. On arrival was found to have bilirubin 12.1, with abnormal transaminase >3 x normal in pattern c/w alcoholic liver disease, with mild less than 1-1/2 times normal alkaline phosphatase. Noted to have hypoalbuminemia and significant thrombocytopenia with platelets of 55 and coagulopathy with INR of 1.8. No nausea or vomiting, hematemesis, melena, or hematochezia. CT of the abdomen was notable for hepatic steatosis, ultrasound of the abdomen noted hepatic steatosis, no liver lesions, no ascites and CBD of 3 mm postcholecystectomy. 10/5/22 confused and agitated yesterday afternoon through the evening, was given IV Ativan per CIWA protocol and initiated on lactulose for ammonia level of 70, currently calm and cooperative, has one-on-one in place for safety. Was initiated on prednisone yesterday. PLAN :  1- Alcoholic hepatitis VS Decompensated cirrhosis   - MELD 26 , Maddrey Discriminant Factor 58  - No active GI bleeding .   - EtOH cessation stressed to pt. Rec EtOH abuse counseling as outpatient  - Monitor for withdrawal  -Monitor electrolytes and replete as needed  -acute hepatitis panel is negative  -Monitor LFTs and INR daily  - DF 58, continue  prednisone 40 mg without NAC for 7 days, then calculate Lille score. If Lille score greater than 0.45, stop treatment (indicates 6 month survival is 25%). If Lille score less than 0.45, continue for 28 days with 2 week taper  -pending clinical course may need to consider inpatient referral to tertiary center for transplant evaluation with the understanding that given his recent alcohol use he may not be a suitable candidate for liver transplant.    2- no Ascites:   2 gm NA diet  3-  EGD for Variceal surveillance and epigastric pain  No overt GIB, no hx of EGD, macrocytic anemia with hgb 11, INR 2.0  -serial HH trend and transfuse accordingly  -EGD pending clinical course  -IV PPI  4-  HCC screening:   Recent imaging Negative for liver mass  5-  alcohol withdrawal vs overt Hepatic encephalopathy    -continue CIWA protocol  -continue lactulose    Thank you for allowing me to participate in the care of your patient. Please feel free to contact me with any concerns.     MELINDA Spence - CNP

## 2022-10-05 NOTE — PROGRESS NOTES
Progress Note  Date:10/5/2022       FIBW:[id number]  Patient Name:Josue Saucedo     YOB: 1970     Age:52 y.o. Subjective      Bilirubin again worsened today, 18.0 up from 17.2 up from 14.7 up from 12.1.  US ABD on 10/3 demonstrating no CBD obstruction or dilation. Other LFTs remain abnormal but without significant worsening or improvement. Patient found to have ammonia level >70 yesterday, for which lactulose was initiated. Objective         Vitals Last 24 Hours:  TEMPERATURE:  Temp  Av °F (36.7 °C)  Min: 97.7 °F (36.5 °C)  Max: 98.2 °F (36.8 °C)  RESPIRATIONS RANGE: Resp  Av  Min: 18  Max: 18  PULSE OXIMETRY RANGE: SpO2  Av.5 %  Min: 99 %  Max: 100 %  PULSE RANGE: Pulse  Av  Min: 72  Max: 78  BLOOD PRESSURE RANGE: Systolic (79LFQ), IUJ:203 , Min:110 , PMB:418   ; Diastolic (21EUX), DWF:11, Min:64, Max:79    I/O (24Hr): Intake/Output Summary (Last 24 hours) at 10/5/2022 0851  Last data filed at 10/4/2022 2049  Gross per 24 hour   Intake 1589.25 ml   Output --   Net 1589.25 ml       Objective:  Vital signs: (most recent): Blood pressure 122/80, pulse 77, temperature 98.4 °F (36.9 °C), temperature source Oral, resp. rate 18, height 6' (1.829 m), weight 192 lb 12.8 oz (87.5 kg), SpO2 99 %. Constitutional: Adult male sitting up in bed no acute distress. Spouse again present at bedside. Head: NCAT  Eyes: PERRLA, EOMI. Moderate scleral icterus present. ENT: Hearing grossly intact. No rhinorrhea. Neck: Trachea midline, phonation normal  Cardiovascular: RRR. Warm and well perfused peripherally. Pulmonary: Normal rate and effort respiration on room air. Grossly CTAB. No coughing during exam.  Abdomen: Soft, nontense, slightly distended. Neurologic: Alert, grossly oriented. No tremors appreciated. Conversational, but speech somewhat delayed today compared to previously. Psychiatric: Cooperative with exam.  Flattened frustrated affect today. Integumentary: No gross bony abnormalities. Jaundice is present. Labs/Imaging/Diagnostics    Labs:  CBC:  Recent Labs     10/03/22  1352 10/04/22  0529 10/05/22  0436   WBC 3.8* 3.8* 4.4*   RBC 3.10* 2.78* 2.99*   HGB 11.2* 10.1* 11.0*   HCT 32.4* 29.3* 31.9*   .7* 105.4* 106.5*   RDW 14.1 14.5 14.8*   PLT 62* 60* 78*       CHEMISTRIES:  Recent Labs     10/03/22  0440 10/03/22  1352 10/04/22  0529 10/05/22  0436    139 140 134*   K 3.0* 3.4 3.4 3.6    101 104 101   CO2 28 30 29 25   BUN 5* 5* 5* 4*   CREATININE 0.20* 0.29* 0.26* 0.47*   GLUCOSE 97 105* 115* 160*   PHOS  --   --  2.0*  --    MG 1.3*  --  1.6* 1.8       PT/INR:  Recent Labs     10/03/22  0440 10/04/22  0529 10/05/22  0436   PROTIME 21.4* 23.0* 22.8*   INR 1.8 2.0 2.0       APTT:No results for input(s): APTT in the last 72 hours. LIVER PROFILE:  Recent Labs     10/03/22  0440 10/04/22  0529 10/04/22  1427 10/05/22  0436   * 158*  --  143*   ALT 67* 60*  --  60*   BILIDIR  --   --  12.2*  --    BILITOT 12.1* 14.7* 17.2* 18.0*   ALKPHOS 149* 153*  --  175*         Imaging Last 24 Hours:  CT ABDOMEN PELVIS WO CONTRAST Additional Contrast? None    Result Date: 10/2/2022  EXAMINATION: CT OF THE ABDOMEN AND PELVIS WITHOUT CONTRAST 10/2/2022 1:56 pm TECHNIQUE: CT of the abdomen and pelvis was performed without the administration of intravenous contrast. Multiplanar reformatted images are provided for review. Automated exposure control, iterative reconstruction, and/or weight based adjustment of the mA/kV was utilized to reduce the radiation dose to as low as reasonably achievable. COMPARISON: General [id number].  HISTORY: ORDERING SYSTEM PROVIDED HISTORY: ab pain TECHNOLOGIST PROVIDED HISTORY: Additional Contrast?->None Reason for exam:->ab pain Decision Support Exception - unselect if not a suspected or confirmed emergency medical condition->Emergency Medical Condition (MA) What reading provider will be dictating this exam?->CRC FINDINGS: Lower Chest: Limited evaluation of the lower lung fields demonstrates unremarkable bronchovascular markings with no evidence of focal infiltrate or consolidation. No evidence of acute cardiopulmonary disease is seen. Organs: The liver demonstrates decreased attenuation, no evidence of masses no evidence of intrahepatic biliary dilatation is seen. The gallbladder is surgically absent The common bile duct is unremarkable. The pancreas and spleen demonstrate no evidence of masses. The adrenal glands demonstrate unremarkable contours with no evidence of masses. Mild stranding of the right perirenal planes and along the right pericolic gutter is visualized epicenter appears to be along the ventral aspect of the midpole of the right kidney visualized on coronal series 601, image 51 and axial series 2 image 85, no evidence of a well-defined mass is seen. No evidence of renal stones. No evidence of right hydronephrosis or hydroureter is seen. The left kidney demonstrates no evidence of mass, nervous of stones. GI/Bowel: Small hiatus hernia is seen. The stomach is unremarkable, no evidence of masses. No significant distention of the small and large bowel loops is visualized. The appendix is visualized and is unremarkable. Abundance of stool is visualized in the large bowel. Pelvis: The urinary bladder is not optimally distended. The prostate gland is unremarkable. No evidence of free fluid in the pelvis. No evidence of pelvic mass is seen. Vessels[de-identified] Atherosclerotic calcifications visualized in the abdominal/pelvic vessels. Peritoneum/Retroperitoneum: No evidence of free fluid or air within the peritoneal cavity. Bones/Soft Tissues: No evidence of lytic or sclerotic bone lesion is seen. Abdominal wall soft tissues are unremarkable. Degenerative changes of the lumbar spine visualized.  Postop is an intended position of the L5 and S1 vertebral bodies with will disc spaces seen previous     Stranding of the peritoneal fat planes is visualized most prominent in the right perirenal fat planes and in the right paracolic gutter. Cannot optimally evaluate for renal mass or focal inflammatory changes in the absence of intravenous contrast material. Hepatic steatosis. Degenerative joint changes. Assessment//Plan           Hospital Problems             Last Modified POA    * (Principal) Acute alcoholic hepatitis 18/0/3942 Yes   Assessment & Plan    Acute decompensated alcoholic cirrhosis with direct >indirect bilirubinemia   Jaundice due to above  Possible colitis  Coagulopathy-likely due to liver dysfunction  Hypokalemia  Hypomagnesemia  Alcohol abuse  Hypertension  Adjustment disorder  Hyperammonemia  Hypophosphatemia       Plan  We will admit the patient to MedSurg floor  Start IV hydration  Banana bag  CIWA protocol  GI consult-Dr. Maximino Miranda contacted in emergency room, recommended against steroids until infection is ruled out  Obtained abdominal ultrasound  Will treat with Cipro and Flagyl for possible colitis  Replete potassium and magnesium, recheck in the morning  IV Protonix  Daily INR  Daily CMP  Daily CBC  Discussed plan of care with patient and his wife at bedside  Home medication will be ordered as appropriate once reconciled by RN/pharmacy  DVT prophylaxis-SCDs, will hold off on anticoagulation given dark stools  10/3: Hemoglobin fell overnight from 13.2 to 10.6. Platelets fell from 33-20. FOBT ordered. Repeat labs at 1400 reassuring, Hgb, PLTs, and K all slightly improved. GI consulted on admission, appreciate recommendations when available. Continues on SCD for DVT prophylaxis in setting of reported dark stools on admission. FOBT remains pending. OK to shower (patient requested), has been stable walking/standing per nursing. Trazodone 50mg PO QHS PRN sleep (started 10/2). 10/4: Bilirubin somewhat worsened to 14.7. Other LFTs essentially stable to very slightly improved.   We will obtain new fractionated bilirubin and check ammonia level. Per nursing, patient slightly more irritable today. Per GI, patient has crossed threshold into true cirrhosis status. 10/5: Hypophosphatemia replaced. Bilirubin continues to worsen, call out to GI service this morning for additional recommendations, as ultrasound ABD on 10/3 demonstrated no acute downstream obstruction of common bile duct, but fractionated bilirubin demonstrated direct>indirect bilirubinemia. Gastroenterology service following case has stopped antibiotics and initiated steroid therapy on 10/4 in setting of reported meld and [facility] discriminant function scores and no evidence of acute infection, with dysfunction felt entirely due to decompensated alcoholic cirrhosis without ascites.       Electronically signed by Helder Howard DO on 10/5/2022 at 8:51 AM

## 2022-10-06 LAB
ALBUMIN SERPL-MCNC: 2.4 G/DL (ref 3.5–4.6)
ALP BLD-CCNC: 146 U/L (ref 35–104)
ALT SERPL-CCNC: 47 U/L (ref 0–41)
AMMONIA: 95 UMOL/L (ref 16–60)
ANION GAP SERPL CALCULATED.3IONS-SCNC: 8 MEQ/L (ref 9–15)
ANISOCYTOSIS: ABNORMAL
AST SERPL-CCNC: 91 U/L (ref 0–40)
BASOPHILS ABSOLUTE: 0 K/UL (ref 0–0.2)
BASOPHILS RELATIVE PERCENT: 0.1 %
BILIRUB SERPL-MCNC: 14.3 MG/DL (ref 0.2–0.7)
BILIRUBIN URINE: ABNORMAL
BLOOD, URINE: NEGATIVE
BUN BLDV-MCNC: 6 MG/DL (ref 6–20)
CALCIUM SERPL-MCNC: 8.1 MG/DL (ref 8.5–9.9)
CHLORIDE BLD-SCNC: 110 MEQ/L (ref 95–107)
CLARITY: CLEAR
CO2: 24 MEQ/L (ref 20–31)
COLOR: ABNORMAL
CREAT SERPL-MCNC: 0.46 MG/DL (ref 0.7–1.2)
EOSINOPHILS ABSOLUTE: 0.1 K/UL (ref 0–0.7)
EOSINOPHILS RELATIVE PERCENT: 0.7 %
GFR AFRICAN AMERICAN: >60
GFR NON-AFRICAN AMERICAN: >60
GLOBULIN: 3.1 G/DL (ref 2.3–3.5)
GLUCOSE BLD-MCNC: 135 MG/DL (ref 70–99)
GLUCOSE URINE: NEGATIVE MG/DL
HCT VFR BLD CALC: 30.4 % (ref 42–52)
HEMOGLOBIN: 10.5 G/DL (ref 14–18)
HYPOCHROMIA: ABNORMAL
INR BLD: 2.2
KETONES, URINE: NEGATIVE MG/DL
LEUKOCYTE ESTERASE, URINE: NEGATIVE
LYMPHOCYTES ABSOLUTE: 0.9 K/UL (ref 1–4.8)
LYMPHOCYTES RELATIVE PERCENT: 10.9 %
MACROCYTES: ABNORMAL
MAGNESIUM: 1.7 MG/DL (ref 1.7–2.4)
MCH RBC QN AUTO: 37 PG (ref 27–31.3)
MCHC RBC AUTO-ENTMCNC: 34.7 % (ref 33–37)
MCV RBC AUTO: 106.6 FL (ref 80–100)
MONOCYTES ABSOLUTE: 0.8 K/UL (ref 0.2–0.8)
MONOCYTES RELATIVE PERCENT: 9.5 %
NEUTROPHILS ABSOLUTE: 6.7 K/UL (ref 1.4–6.5)
NEUTROPHILS RELATIVE PERCENT: 78.8 %
NITRITE, URINE: NEGATIVE
PDW BLD-RTO: 14.9 % (ref 11.5–14.5)
PH UA: 7.5 (ref 5–9)
PLATELET # BLD: 104 K/UL (ref 130–400)
PLATELET SLIDE REVIEW: ABNORMAL
POTASSIUM SERPL-SCNC: 3.5 MEQ/L (ref 3.4–4.9)
PROTEIN UA: NEGATIVE MG/DL
PROTHROMBIN TIME: 24.5 SEC (ref 12.3–14.9)
RBC # BLD: 2.85 M/UL (ref 4.7–6.1)
SODIUM BLD-SCNC: 142 MEQ/L (ref 135–144)
SPECIFIC GRAVITY UA: 1.02 (ref 1–1.03)
TARGET CELLS: ABNORMAL
TOTAL PROTEIN: 5.5 G/DL (ref 6.3–8)
URINE REFLEX TO CULTURE: ABNORMAL
UROBILINOGEN, URINE: 0.2 E.U./DL
WBC # BLD: 8.5 K/UL (ref 4.8–10.8)

## 2022-10-06 PROCEDURE — 2060000000 HC ICU INTERMEDIATE R&B

## 2022-10-06 PROCEDURE — 83735 ASSAY OF MAGNESIUM: CPT

## 2022-10-06 PROCEDURE — 99233 SBSQ HOSP IP/OBS HIGH 50: CPT | Performed by: NURSE PRACTITIONER

## 2022-10-06 PROCEDURE — 85025 COMPLETE CBC W/AUTO DIFF WBC: CPT

## 2022-10-06 PROCEDURE — 81003 URINALYSIS AUTO W/O SCOPE: CPT

## 2022-10-06 PROCEDURE — 6370000000 HC RX 637 (ALT 250 FOR IP): Performed by: NURSE PRACTITIONER

## 2022-10-06 PROCEDURE — 6370000000 HC RX 637 (ALT 250 FOR IP): Performed by: INTERNAL MEDICINE

## 2022-10-06 PROCEDURE — A4216 STERILE WATER/SALINE, 10 ML: HCPCS | Performed by: INTERNAL MEDICINE

## 2022-10-06 PROCEDURE — 6360000002 HC RX W HCPCS: Performed by: INTERNAL MEDICINE

## 2022-10-06 PROCEDURE — 36415 COLL VENOUS BLD VENIPUNCTURE: CPT

## 2022-10-06 PROCEDURE — 82140 ASSAY OF AMMONIA: CPT

## 2022-10-06 PROCEDURE — C9113 INJ PANTOPRAZOLE SODIUM, VIA: HCPCS | Performed by: INTERNAL MEDICINE

## 2022-10-06 PROCEDURE — 85610 PROTHROMBIN TIME: CPT

## 2022-10-06 PROCEDURE — 2580000003 HC RX 258: Performed by: INTERNAL MEDICINE

## 2022-10-06 PROCEDURE — 80053 COMPREHEN METABOLIC PANEL: CPT

## 2022-10-06 RX ADMIN — Medication 100 MG: at 09:59

## 2022-10-06 RX ADMIN — SODIUM CHLORIDE, PRESERVATIVE FREE 40 MG: 5 INJECTION INTRAVENOUS at 10:11

## 2022-10-06 RX ADMIN — THERA TABS 1 TABLET: TAB at 09:59

## 2022-10-06 RX ADMIN — TRAZODONE HYDROCHLORIDE 50 MG: 50 TABLET ORAL at 21:11

## 2022-10-06 RX ADMIN — LACTULOSE 20 G: 20 SOLUTION ORAL at 15:28

## 2022-10-06 RX ADMIN — SODIUM CHLORIDE: 9 INJECTION, SOLUTION INTRAVENOUS at 10:01

## 2022-10-06 RX ADMIN — SODIUM CHLORIDE: 9 INJECTION, SOLUTION INTRAVENOUS at 21:14

## 2022-10-06 RX ADMIN — POTASSIUM BICARBONATE 40 MEQ: 782 TABLET, EFFERVESCENT ORAL at 10:18

## 2022-10-06 RX ADMIN — LACTULOSE 20 G: 20 SOLUTION ORAL at 09:58

## 2022-10-06 RX ADMIN — PREDNISONE 40 MG: 10 TABLET ORAL at 09:59

## 2022-10-06 RX ADMIN — Medication 10 ML: at 10:14

## 2022-10-06 RX ADMIN — LACTULOSE 20 G: 20 SOLUTION ORAL at 21:09

## 2022-10-06 NOTE — OR NURSING
Pt resting in bed, pt able to voice needs and follow commands, pt displays no s/s of discomfort at this time, no behaviors noted at this time, pt wife at bedside, call light within reach, pt will continue to be monitored.

## 2022-10-06 NOTE — PROGRESS NOTES
Progress Note  Date:10/6/2022       MWOQ:[id number]  Patient Name:Josue Saucedo     YOB: 1970     Age:52 y.o. Subjective      No acute events reported overnight. Ammonia level was 95 from 70 despite lactulose. INR worsened to 2.2 from 1.7 on admission. WBC slightly elevated today but afebrile, suspected likely from initiation of steroids. Objective         Vitals Last 24 Hours:  TEMPERATURE:  Temp  Av.2 °F (36.8 °C)  Min: 97.9 °F (36.6 °C)  Max: 98.4 °F (36.9 °C)  RESPIRATIONS RANGE: Resp  Av.5  Min: 16  Max: 18  PULSE OXIMETRY RANGE: SpO2  Av.8 %  Min: 97 %  Max: 100 %  PULSE RANGE: Pulse  Av.7  Min: 71  Max: 84  BLOOD PRESSURE RANGE: Systolic (65ZGW), DDP:125 , Min:105 , OWR:253   ; Diastolic (05WKG), VGC:44, Min:56, Max:80    I/O (24Hr): Intake/Output Summary (Last 24 hours) at 10/6/2022 0834  Last data filed at 10/5/2022 1514  Gross per 24 hour   Intake --   Output 600 ml   Net -600 ml       Objective:  Vital signs: (most recent): Blood pressure 105/70, pulse 71, temperature 97.9 °F (36.6 °C), temperature source Oral, resp. rate 16, height 6' (1.829 m), weight 192 lb 12.8 oz (87.5 kg), SpO2 97 %. Constitutional: Adult male sitting up in bed no acute distress. Spouse again present at bedside. Head: NCAT  Eyes: PERRLA, EOMI. Moderate scleral icterus present. ENT: Hearing grossly intact. No rhinorrhea. Neck: Trachea midline, phonation normal  Cardiovascular: RRR. Warm and well perfused peripherally. Pulmonary: Normal rate and effort respiration on room air. Grossly CTAB. No coughing during exam.  Abdomen: Soft, nontense, slightly distended. Neurologic: Alert, grossly oriented. No tremors appreciated. Conversational, but speech somewhat delayed today compared to previously. Psychiatric: Cooperative with exam.  Flattened affect. Integumentary: No gross bony abnormalities. Worse jaundice is present.       Labs/Imaging/Diagnostics Labs:  CBC:  Recent Labs     10/04/22  0529 10/05/22  0436 10/06/22  0612   WBC 3.8* 4.4* 8.5   RBC 2.78* 2.99* 2.85*   HGB 10.1* 11.0* 10.5*   HCT 29.3* 31.9* 30.4*   .4* 106.5* 106.6*   RDW 14.5 14.8* 14.9*   PLT 60* 78* 104*       CHEMISTRIES:  Recent Labs     10/03/22  1352 10/04/22  0529 10/05/22  0436    140 134*   K 3.4 3.4 3.6    104 101   CO2 30 29 25   BUN 5* 5* 4*   CREATININE 0.29* 0.26* 0.47*   GLUCOSE 105* 115* 160*   PHOS  --  2.0*  --    MG  --  1.6* 1.8       PT/INR:  Recent Labs     10/04/22  0529 10/05/22  0436 10/06/22  0612   PROTIME 23.0* 22.8* 24.5*   INR 2.0 2.0 2.2       APTT:No results for input(s): APTT in the last 72 hours. LIVER PROFILE:  Recent Labs     10/04/22  0529 10/04/22  1427 10/05/22  0436   *  --  143*   ALT 60*  --  60*   BILIDIR  --  12.2*  --    BILITOT 14.7* 17.2* 18.0*   ALKPHOS 153*  --  175*         Imaging Last 24 Hours:  CT ABDOMEN PELVIS WO CONTRAST Additional Contrast? None    Result Date: 10/2/2022  EXAMINATION: CT OF THE ABDOMEN AND PELVIS WITHOUT CONTRAST 10/2/2022 1:56 pm TECHNIQUE: CT of the abdomen and pelvis was performed without the administration of intravenous contrast. Multiplanar reformatted images are provided for review. Automated exposure control, iterative reconstruction, and/or weight based adjustment of the mA/kV was utilized to reduce the radiation dose to as low as reasonably achievable. COMPARISON: General [id number]. HISTORY: ORDERING SYSTEM PROVIDED HISTORY: ab pain TECHNOLOGIST PROVIDED HISTORY: Additional Contrast?->None Reason for exam:->ab pain Decision Support Exception - unselect if not a suspected or confirmed emergency medical condition->Emergency Medical Condition (MA) What reading provider will be dictating this exam?->CRC FINDINGS: Lower Chest: Limited evaluation of the lower lung fields demonstrates unremarkable bronchovascular markings with no evidence of focal infiltrate or consolidation.  No evidence of acute cardiopulmonary disease is seen. Organs: The liver demonstrates decreased attenuation, no evidence of masses no evidence of intrahepatic biliary dilatation is seen. The gallbladder is surgically absent The common bile duct is unremarkable. The pancreas and spleen demonstrate no evidence of masses. The adrenal glands demonstrate unremarkable contours with no evidence of masses. Mild stranding of the right perirenal planes and along the right pericolic gutter is visualized epicenter appears to be along the ventral aspect of the midpole of the right kidney visualized on coronal series 601, image 51 and axial series 2 image 85, no evidence of a well-defined mass is seen. No evidence of renal stones. No evidence of right hydronephrosis or hydroureter is seen. The left kidney demonstrates no evidence of mass, nervous of stones. GI/Bowel: Small hiatus hernia is seen. The stomach is unremarkable, no evidence of masses. No significant distention of the small and large bowel loops is visualized. The appendix is visualized and is unremarkable. Abundance of stool is visualized in the large bowel. Pelvis: The urinary bladder is not optimally distended. The prostate gland is unremarkable. No evidence of free fluid in the pelvis. No evidence of pelvic mass is seen. Vessels[de-identified] Atherosclerotic calcifications visualized in the abdominal/pelvic vessels. Peritoneum/Retroperitoneum: No evidence of free fluid or air within the peritoneal cavity. Bones/Soft Tissues: No evidence of lytic or sclerotic bone lesion is seen. Abdominal wall soft tissues are unremarkable. Degenerative changes of the lumbar spine visualized. Postop is an intended position of the L5 and S1 vertebral bodies with will disc spaces seen previous     Stranding of the peritoneal fat planes is visualized most prominent in the right perirenal fat planes and in the right paracolic gutter.  Cannot optimally evaluate for renal mass or focal inflammatory changes in the absence of intravenous contrast material. Hepatic steatosis. Degenerative joint changes. Assessment//Plan           Hospital Problems             Last Modified POA    * (Principal) Acute alcoholic hepatitis 78/9/8643 Yes   Assessment & Plan    Acute decompensated alcoholic cirrhosis with direct >indirect bilirubinemia   Jaundice due to above  Possible colitis  Coagulopathy-likely due to liver dysfunction  Hypokalemia  Hypomagnesemia  Alcohol abuse  Hypertension  Adjustment disorder  Hyperammonemia  Hypophosphatemia       Plan  We will admit the patient to MedSurg floor  Start IV hydration  Banana bag  CIWA protocol  GI consult-Dr. Yasmeen Morgan contacted in emergency room, recommended against steroids until infection is ruled out  Obtained abdominal ultrasound  Will treat with Cipro and Flagyl for possible colitis  Replete potassium and magnesium, recheck in the morning  IV Protonix  Daily INR  Daily CMP  Daily CBC  Discussed plan of care with patient and his wife at bedside  Home medication will be ordered as appropriate once reconciled by RN/pharmacy  DVT prophylaxis-SCDs, will hold off on anticoagulation given dark stools  10/3: Hemoglobin fell overnight from 13.2 to 10.6. Platelets fell from 54-75. FOBT ordered. Repeat labs at 1400 reassuring, Hgb, PLTs, and K all slightly improved. GI consulted on admission, appreciate recommendations when available. Continues on SCD for DVT prophylaxis in setting of reported dark stools on admission. FOBT remains pending. OK to shower (patient requested), has been stable walking/standing per nursing. Trazodone 50mg PO QHS PRN sleep (started 10/2). 10/4: Bilirubin somewhat worsened to 14.7. Other LFTs essentially stable to very slightly improved. We will obtain new fractionated bilirubin and check ammonia level. Per nursing, patient slightly more irritable today. Per GI, patient has crossed threshold into true cirrhosis status. 10/5: Hypophosphatemia replaced. Bilirubin continues to worsen, call out to GI service this morning for additional recommendations, as ultrasound ABD on 10/3 demonstrated no acute downstream obstruction of common bile duct, but fractionated bilirubin demonstrated direct>indirect bilirubinemia. Gastroenterology service following case has stopped antibiotics and initiated steroid therapy on 10/4 in setting of reported meld and [facility] discriminant function scores and no evidence of acute infection, with dysfunction felt entirely due to decompensated alcoholic cirrhosis without ascites. 10/6: Total bilirubin very slightly improved today, first improvement since admission. INR and ammonia levels still slightly worsening, with some mild hepatic encephalopathy symptoms reported overnight. Patient continues on lactulose, with goal 3 loose BMs per day. GI service planning to continue steroids at present, and state that if LFTs other than just T bili begin to improve patient may be able to be discharged in 1-2 days with outpatient follow-up in 1 week for calculation of Mille score with GI clinic, and discussion of next steps (e.g. longer course of steroids versus referral to transplant service at tertiary center).       Electronically signed by Nidia Payne DO on 10/6/2022 at 8:34 AM

## 2022-10-06 NOTE — PROGRESS NOTES
Gastroenterology Progress Note    Caryle Code is a 46 y.o. male patient. Hospitalization Day:4    Chief C/O: alcoholic hepatitis    SUBJECTIVE: seen and examined, less confused does not need 1:1 for safety at this time, TB trending down, INR up. No overt GIB    ROS:  Gastrointestinal ROS: no abdominal pain, change in bowel habits, or black or bloody stools    Physical    VITALS:  /70   Pulse 71   Temp 97.9 °F (36.6 °C) (Oral)   Resp 16   Ht 6' (1.829 m)   Wt 192 lb 12.8 oz (87.5 kg)   SpO2 97%   BMI 26.15 kg/m²   TEMPERATURE:  Current - Temp: 97.9 °F (36.6 °C); Max - Temp  Av.2 °F (36.8 °C)  Min: 97.9 °F (36.6 °C)  Max: 98.4 °F (36.9 °C)    General:  Alert and oriented,  No apparent distress  Skin- with jaundice  Eyes: icteric sclera  Cardiac: RRR, Nl s1s2, without murmurs  Lungs CTA Bilaterally, normal effort  Abdomen soft, ND, NT, no HSM, Bowel sounds normal  Ext: with edema  Neuro: no asterixis     Data    Data Review:    Recent Labs     10/04/22  0529 10/05/22  0436 10/06/22  0612   WBC 3.8* 4.4* 8.5   HGB 10.1* 11.0* 10.5*   HCT 29.3* 31.9* 30.4*   .4* 106.5* 106.6*   PLT 60* 78* 104*     Recent Labs     10/04/22  0529 10/05/22  0436 10/06/22  0612    134* 142   K 3.4 3.6 3.5    101 110*   CO2 29 25 24   PHOS 2.0*  --   --    BUN 5* 4* 6   CREATININE 0.26* 0.47* 0.46*     Recent Labs     10/04/22  0529 10/04/22  1427 10/05/22  0436 10/06/22  0612   *  --  143* 91*   ALT 60*  --  60* 47*   BILIDIR  --  12.2*  --   --    BILITOT 14.7* 17.2* 18.0* 14.3*   ALKPHOS 153*  --  175* 146*     No results for input(s): LIPASE, AMYLASE in the last 72 hours. Recent Labs     10/04/22  0529 10/05/22  0436 10/06/22  0612   PROTIME 23.0* 22.8* 24.5*   INR 2.0 2.0 2.2           ASSESSMENT:  47 y/o male admitted with epigastric pain x 1 week and alcoholic liver disease. drinks approximately 10-15 beers daily. Has been told in the past that he has liver disease.   On arrival was found to have bilirubin 12.1, with abnormal transaminase >3 x normal in pattern c/w alcoholic liver disease, with mild less than 1-1/2 times normal alkaline phosphatase. Noted to have hypoalbuminemia and significant thrombocytopenia with platelets of 55 and coagulopathy with INR of 1.8. No nausea or vomiting, hematemesis, melena, or hematochezia. CT of the abdomen was notable for hepatic steatosis, ultrasound of the abdomen noted hepatic steatosis, no liver lesions, no ascites and CBD of 3 mm postcholecystectomy. 10/5/22 confused and agitated yesterday afternoon through the evening, was given IV Ativan per CIWA protocol and initiated on lactulose for ammonia level of 70, currently calm and cooperative, has one-on-one in place for safety. Was initiated on prednisone yesterday. 10/6/22     PLAN :  1- Alcoholic hepatitis w/Decompensated cirrhosis   - MELD 25 , Maddrey Discriminant Factor 58  - No active GI bleeding .   - EtOH cessation stressed to pt. Rec EtOH abuse counseling as outpatient  - Monitor for withdrawal  -Monitor electrolytes and replete as needed  -acute hepatitis panel is negative  -Monitor LFTs and INR daily  - DF 58, continue  prednisone 40 mg without NAC for 7 days, then calculate Lille score. If Lille score greater than 0.45, stop treatment (indicates 6 month survival is 25%). If Lille score less than 0.45, continue for 28 days with 2 week taper  -pending clinical course may need to consider inpatient referral to tertiary center for transplant evaluation with the understanding that given his recent alcohol use he may not be a suitable candidate for liver transplant.    2- no Ascites:   2 gm NA diet  3-  EGD for Variceal surveillance and epigastric pain  No overt GIB, no hx of EGD, macrocytic anemia with hgb 10.5, INR 2.2  -serial HH trend and transfuse accordingly  -EGD pending clinical course  -IV PPI  4-  HCC screening:   Recent imaging Negative for liver mass  5-  alcohol withdrawal vs overt Hepatic encephalopathy    -continue CIWA protocol  -continue lactulose    Thank you for allowing me to participate in the care of your patient. Please feel free to contact me with any concerns.     Haris Whitten, APRN - CNP

## 2022-10-07 VITALS
HEIGHT: 72 IN | OXYGEN SATURATION: 97 % | HEART RATE: 74 BPM | WEIGHT: 192.8 LBS | SYSTOLIC BLOOD PRESSURE: 116 MMHG | TEMPERATURE: 98.6 F | DIASTOLIC BLOOD PRESSURE: 84 MMHG | RESPIRATION RATE: 18 BRPM | BODY MASS INDEX: 26.11 KG/M2

## 2022-10-07 DIAGNOSIS — K76.82 HEPATIC ENCEPHALOPATHY: Primary | ICD-10-CM

## 2022-10-07 DIAGNOSIS — K70.10 ALCOHOLIC HEPATITIS WITHOUT ASCITES: ICD-10-CM

## 2022-10-07 DIAGNOSIS — K70.10 ALCOHOLIC HEPATITIS WITHOUT ASCITES: Primary | ICD-10-CM

## 2022-10-07 LAB
ALBUMIN SERPL-MCNC: 2.6 G/DL (ref 3.5–4.6)
ALBUMIN SERPL-MCNC: 2.6 G/DL (ref 3.5–4.6)
ALP BLD-CCNC: 157 U/L (ref 35–104)
ALP BLD-CCNC: 163 U/L (ref 35–104)
ALT SERPL-CCNC: 48 U/L (ref 0–41)
ALT SERPL-CCNC: 48 U/L (ref 0–41)
AMMONIA: 63 UMOL/L (ref 16–60)
ANION GAP SERPL CALCULATED.3IONS-SCNC: 10 MEQ/L (ref 9–15)
ANION GAP SERPL CALCULATED.3IONS-SCNC: 9 MEQ/L (ref 9–15)
AST SERPL-CCNC: 80 U/L (ref 0–40)
AST SERPL-CCNC: 85 U/L (ref 0–40)
BASOPHILS ABSOLUTE: 0 K/UL (ref 0–0.2)
BASOPHILS RELATIVE PERCENT: 0.1 %
BILIRUB SERPL-MCNC: 13.1 MG/DL (ref 0.2–0.7)
BILIRUB SERPL-MCNC: 13.1 MG/DL (ref 0.2–0.7)
BUN BLDV-MCNC: 7 MG/DL (ref 6–20)
BUN BLDV-MCNC: 7 MG/DL (ref 6–20)
CALCIUM SERPL-MCNC: 7.9 MG/DL (ref 8.5–9.9)
CALCIUM SERPL-MCNC: 8.2 MG/DL (ref 8.5–9.9)
CHLORIDE BLD-SCNC: 108 MEQ/L (ref 95–107)
CHLORIDE BLD-SCNC: 109 MEQ/L (ref 95–107)
CO2: 24 MEQ/L (ref 20–31)
CO2: 25 MEQ/L (ref 20–31)
CREAT SERPL-MCNC: 0.2 MG/DL (ref 0.7–1.2)
CREAT SERPL-MCNC: <0.17 MG/DL (ref 0.7–1.2)
EOSINOPHILS ABSOLUTE: 0.1 K/UL (ref 0–0.7)
EOSINOPHILS RELATIVE PERCENT: 0.8 %
GFR AFRICAN AMERICAN: >60
GFR AFRICAN AMERICAN: >60
GFR NON-AFRICAN AMERICAN: >60
GFR NON-AFRICAN AMERICAN: >60
GLOBULIN: 3.2 G/DL (ref 2.3–3.5)
GLOBULIN: 3.3 G/DL (ref 2.3–3.5)
GLUCOSE BLD-MCNC: 116 MG/DL (ref 70–99)
GLUCOSE BLD-MCNC: 87 MG/DL (ref 70–99)
HCT VFR BLD CALC: 32.3 % (ref 42–52)
HCT VFR BLD CALC: 33 % (ref 42–52)
HEMOGLOBIN: 11.2 G/DL (ref 14–18)
HEMOGLOBIN: 11.3 G/DL (ref 14–18)
INR BLD: 2
INR BLD: 2
LYMPHOCYTES ABSOLUTE: 1 K/UL (ref 1–4.8)
LYMPHOCYTES RELATIVE PERCENT: 13.9 %
MAGNESIUM: 1.4 MG/DL (ref 1.7–2.4)
MCH RBC QN AUTO: 36.8 PG (ref 27–31.3)
MCH RBC QN AUTO: 37.2 PG (ref 27–31.3)
MCHC RBC AUTO-ENTMCNC: 34.2 % (ref 33–37)
MCHC RBC AUTO-ENTMCNC: 34.6 % (ref 33–37)
MCV RBC AUTO: 107.3 FL (ref 80–100)
MCV RBC AUTO: 107.7 FL (ref 80–100)
MONOCYTES ABSOLUTE: 0.7 K/UL (ref 0.2–0.8)
MONOCYTES RELATIVE PERCENT: 9.6 %
NEUTROPHILS ABSOLUTE: 5.2 K/UL (ref 1.4–6.5)
NEUTROPHILS RELATIVE PERCENT: 75.6 %
PDW BLD-RTO: 15.5 % (ref 11.5–14.5)
PDW BLD-RTO: 15.8 % (ref 11.5–14.5)
PLATELET # BLD: 115 K/UL (ref 130–400)
PLATELET # BLD: 123 K/UL (ref 130–400)
POTASSIUM SERPL-SCNC: 3.5 MEQ/L (ref 3.4–4.9)
POTASSIUM SERPL-SCNC: 3.6 MEQ/L (ref 3.4–4.9)
PROTHROMBIN TIME: 22.4 SEC (ref 12.3–14.9)
PROTHROMBIN TIME: 23.2 SEC (ref 12.3–14.9)
RBC # BLD: 3.01 M/UL (ref 4.7–6.1)
RBC # BLD: 3.07 M/UL (ref 4.7–6.1)
SODIUM BLD-SCNC: 142 MEQ/L (ref 135–144)
SODIUM BLD-SCNC: 143 MEQ/L (ref 135–144)
TOTAL PROTEIN: 5.8 G/DL (ref 6.3–8)
TOTAL PROTEIN: 5.9 G/DL (ref 6.3–8)
WBC # BLD: 6.9 K/UL (ref 4.8–10.8)
WBC # BLD: 7.3 K/UL (ref 4.8–10.8)

## 2022-10-07 PROCEDURE — 82140 ASSAY OF AMMONIA: CPT

## 2022-10-07 PROCEDURE — 99233 SBSQ HOSP IP/OBS HIGH 50: CPT | Performed by: NURSE PRACTITIONER

## 2022-10-07 PROCEDURE — 6370000000 HC RX 637 (ALT 250 FOR IP): Performed by: INTERNAL MEDICINE

## 2022-10-07 PROCEDURE — 2580000003 HC RX 258: Performed by: INTERNAL MEDICINE

## 2022-10-07 PROCEDURE — C9113 INJ PANTOPRAZOLE SODIUM, VIA: HCPCS | Performed by: INTERNAL MEDICINE

## 2022-10-07 PROCEDURE — 80053 COMPREHEN METABOLIC PANEL: CPT

## 2022-10-07 PROCEDURE — 36415 COLL VENOUS BLD VENIPUNCTURE: CPT

## 2022-10-07 PROCEDURE — 85610 PROTHROMBIN TIME: CPT

## 2022-10-07 PROCEDURE — 6370000000 HC RX 637 (ALT 250 FOR IP): Performed by: NURSE PRACTITIONER

## 2022-10-07 PROCEDURE — 85027 COMPLETE CBC AUTOMATED: CPT

## 2022-10-07 PROCEDURE — 85025 COMPLETE CBC W/AUTO DIFF WBC: CPT

## 2022-10-07 PROCEDURE — 6360000002 HC RX W HCPCS: Performed by: INTERNAL MEDICINE

## 2022-10-07 PROCEDURE — 83735 ASSAY OF MAGNESIUM: CPT

## 2022-10-07 RX ORDER — LACTULOSE 10 G/15ML
10 SOLUTION ORAL 3 TIMES DAILY
Qty: 946 ML | Refills: 3 | Status: SHIPPED | OUTPATIENT
Start: 2022-10-07

## 2022-10-07 RX ORDER — PREDNISONE 10 MG/1
TABLET ORAL
Qty: 136 TABLET | Refills: 0 | Status: SHIPPED | OUTPATIENT
Start: 2022-10-07 | End: 2022-10-07 | Stop reason: HOSPADM

## 2022-10-07 RX ORDER — PREDNISONE 10 MG/1
TABLET ORAL
Qty: 124 TABLET | Refills: 0 | Status: SHIPPED | OUTPATIENT
Start: 2022-10-07 | End: 2022-10-08

## 2022-10-07 RX ORDER — LACTULOSE 10 G/15ML
10 SOLUTION ORAL 3 TIMES DAILY
Qty: 946 ML | Refills: 3 | Status: SHIPPED | OUTPATIENT
Start: 2022-10-07 | End: 2022-10-07 | Stop reason: HOSPADM

## 2022-10-07 RX ADMIN — POTASSIUM BICARBONATE 40 MEQ: 782 TABLET, EFFERVESCENT ORAL at 08:21

## 2022-10-07 RX ADMIN — PREDNISONE 40 MG: 10 TABLET ORAL at 08:21

## 2022-10-07 RX ADMIN — LACTULOSE 20 G: 20 SOLUTION ORAL at 08:21

## 2022-10-07 RX ADMIN — THERA TABS 1 TABLET: TAB at 08:20

## 2022-10-07 RX ADMIN — SODIUM CHLORIDE, PRESERVATIVE FREE 40 MG: 5 INJECTION INTRAVENOUS at 08:21

## 2022-10-07 RX ADMIN — Medication 100 MG: at 08:20

## 2022-10-07 RX ADMIN — LORAZEPAM 1 MG: 1 TABLET ORAL at 09:02

## 2022-10-07 NOTE — PLAN OF CARE
Nutrition Problem #1: Inadequate oral intake  Intervention: Food and/or Nutrient Delivery: Continue Oral Nutrition Supplement, Continue Current Diet  Nutritional  Goals: Po intake >75%. Maintain weight. Improved lab values.

## 2022-10-07 NOTE — DISCHARGE INSTR - ACTIVITY
Prescription for the steroids sent to your pharmacy.   You need to have bloodwork on Tuesday and a drs appt on Thursday

## 2022-10-07 NOTE — PROGRESS NOTES
Gastroenterology Progress Note    Ashley Trejo is a 46 y.o. male patient. Hospitalization Day:5    Chief C/O: alcoholic hepatitis    SUBJECTIVE: Seen and examined, alert and oriented, Ativan as needed, tolerating diet, no abdominal pain, no overt bleeding, a.m. labs pending    ROS:  Gastrointestinal ROS: no abdominal pain, change in bowel habits, or black or bloody stools    Physical    VITALS:  BP (!) 91/58   Pulse 74   Temp 97.9 °F (36.6 °C) (Oral)   Resp 18   Ht 6' (1.829 m)   Wt 192 lb 12.8 oz (87.5 kg)   SpO2 96%   BMI 26.15 kg/m²   TEMPERATURE:  Current - Temp: 97.9 °F (36.6 °C); Max - Temp  Av.3 °F (36.8 °C)  Min: 97.9 °F (36.6 °C)  Max: 98.8 °F (37.1 °C)    General:  Alert and oriented,  No apparent distress  Skin- with jaundice  Eyes: icteric sclera  Cardiac: RRR, Nl s1s2, without murmurs  Lungs CTA Bilaterally, normal effort  Abdomen soft, ND, NT, no HSM, Bowel sounds normal  Ext: without edema  Neuro: no asterixis     Data    Data Review:    Recent Labs     10/05/22  0436 10/06/22  0612   WBC 4.4* 8.5   HGB 11.0* 10.5*   HCT 31.9* 30.4*   .5* 106.6*   PLT 78* 104*     Recent Labs     10/05/22  0436 10/06/22  0612   * 142   K 3.6 3.5    110*   CO2 25 24   BUN 4* 6   CREATININE 0.47* 0.46*     Recent Labs     10/04/22  1427 10/05/22  0436 10/06/22  0612   AST  --  143* 91*   ALT  --  60* 47*   BILIDIR 12.2*  --   --    BILITOT 17.2* 18.0* 14.3*   ALKPHOS  --  175* 146*     No results for input(s): LIPASE, AMYLASE in the last 72 hours. Recent Labs     10/05/22  0436 10/06/22  0612   PROTIME 22.8* 24.5*   INR 2.0 2.2           ASSESSMENT:  45 y/o male admitted with epigastric pain x 1 week and alcoholic liver disease. drinks approximately 10-15 beers daily. Has been told in the past that he has liver disease.   On arrival was found to have bilirubin 12.1, with abnormal transaminase >3 x normal in pattern c/w alcoholic liver disease, with mild less than 1-1/2 times normal alkaline phosphatase. Noted to have hypoalbuminemia and significant thrombocytopenia with platelets of 55 and coagulopathy with INR of 1.8. No nausea or vomiting, hematemesis, melena, or hematochezia. CT of the abdomen was notable for hepatic steatosis, ultrasound of the abdomen noted hepatic steatosis, no liver lesions, no ascites and CBD of 3 mm postcholecystectomy. 10/5/22 confused and agitated yesterday afternoon through the evening, was given IV Ativan per [facility] protocol and initiated on lactulose for ammonia level of 70, currently calm and cooperative, has one-on-one in place for safety. Was initiated on prednisone yesterday. 10/6/22 CIWA protocol in place, alert and oriented, on lactulose for elevated ammonia level, remains jaundice, on prednisone for alcoholic hepatitis, LFTs and total bilirubin are trending down, INR is up. 10/7/22 a.m. labs pending, alert and oriented, Ativan as needed, on lactulose, having bowel movements, no blood or mucus, no abdominal pain, tolerating diet. PLAN :  1- Alcoholic hepatitis w/Decompensated cirrhosis   - AM labs pending,  MELD 25 , Maddrey Discriminant Factor 58  - No active GI bleeding .   - EtOH cessation stressed to pt. Rec EtOH abuse counseling as outpatient  - Monitor for withdrawal  -Monitor electrolytes and replete as needed  -acute hepatitis panel is negative  -Monitor LFTs and INR daily  - DF 58, continue  prednisone 40 mg daily, on day 3, continue for 7 days, then calculate Lille score. If Lille score greater than 0.45, stop treatment (indicates 6 month survival is 25%). If Lille score less than 0.45, continue for 28 days with 2 week taper  -pending clinical course may need to consider inpatient referral to tertiary center for transplant evaluation with the understanding that given his recent alcohol use he may not be a suitable candidate for liver transplant.    2- no Ascites:   2 gm NA diet  3-  EGD for Variceal surveillance and epigastric pain  No overt GIB, no hx of EGD, macrocytic anemia with hgb 10.5, INR 2.2  -serial HH trend and transfuse accordingly  -EGD pending clinical course  -IV PPI  4-  HCC screening:   Recent imaging Negative for liver mass  5-  alcohol withdrawal vs overt Hepatic encephalopathy    -continue CIWA protocol  -continue lactulose, titrate to 2-3 soft BMS daily       Thank you for allowing me to participate in the care of your patient. Please feel free to contact me with any concerns.   After 5 pm today Dr Pepe Koroma is covering hospital call for GI/hepatology, please feel free to reach out to the answering service for any related questions or concerns     MELINDA Camara - CNP

## 2022-10-07 NOTE — DISCHARGE SUMMARY
Discharge Summary    Date: 10/7/2022  Patient Name: Dora Cavazos    YOB: 1970     Age: 46 y.o. Admit Date: 10/2/2022  Discharge Date: 10/7/2022  Discharge Condition: [address]    Admission Diagnosis  Acute alcoholic hepatitis [W86.98]      Discharge Diagnosis  Principal Problem:    Acute alcoholic hepatitis  Resolved Problems:    * No resolved hospital problems. [facility] AND [facility] Stay  Narrative of Hospital Course:  29-year-old male admitted 10/2/2022 with acute hepatic encephalopathy in setting of alcoholic cirrhosis with decompensation. Coagulopathic, elevated transaminases, jaundice, and hepatic encephalopathy in setting of elevated ammonia level. Several days without significant improvement, followed by hospitalist and gastroenterology services. Initiated on lactulose and steroids, which began to take effect after several days with some modest improvement in mentation and hepatic function markers. Meld score demonstrating ~19% 3-month estimated mortality. With cares provided during admission, patient began to trend improvement, and was deemed sufficiently improved and appropriately stable for discharge home on steroids and lactulose as of 10/7/2022. Complete events from alcohol reiterated repeatedly. Gastroenterology planning to continue patient on steroids on discharge as well as lactulose, have labs drawn early next week, and then have patient follow-up in their outpatient clinic mid-to-late next week for reevaluation and to plan neck steps (e.g. extended course of steroid therapy versus referral to transplant center). Patient will additionally need outpatient follow-up with PCP clinic.     Consultants:  IP CONSULT TO GI  IP CONSULT TO SOCIAL WORK    Surgeries/procedures Performed:      Treatments:    IV Hydration, Steroids and Therapies        Discharge Plan/Disposition:  Home    Hospital/Incidental Findings Requiring Follow Up:    Patient Instructions:    Diet:    Activity:  For number of days (if applicable): Other Instructions: Complete alcohol abstinence. Provider Follow-Up:   No follow-ups on file. Significant Diagnostic Studies:    Recent Labs:  Admission on 10/02/2022  No results displayed because visit has over 200 results. ------------    Radiology last 7 days:  CT ABDOMEN PELVIS WO CONTRAST Additional Contrast? None    Result Date: 10/2/2022  Stranding of the peritoneal fat planes is visualized most prominent in the right perirenal fat planes and in the right paracolic gutter. Cannot optimally evaluate for renal mass or focal inflammatory changes in the absence of intravenous contrast material. Hepatic steatosis. Degenerative joint changes. XR CHEST (2 VW)    Result Date: 10/3/2022  Mild linear atelectasis and or scarring at the bases. US ABDOMEN LIMITED    Result Date: 10/3/2022  Diffusely echogenic liver parenchyma consistent with parenchymal disease process such as hepatic steatosis. No focal liver lesion Questionable focus 1.6 cm somewhat high isoechoic to hypoechoic anteriorly adjacent to the pancreatic head favoring adjacent bowel however underlying soft tissue mass would not be entirely excluded. Pending Labs     Order Current Status    Culture, Blood 1 Preliminary result    Culture, Blood 2 Preliminary result        Discharge Medications    Current Discharge Medication List        Current Discharge Medication List        Current Discharge Medication List    CONTINUE these medications which have NOT CHANGED    Milk Thistle 300 MG CAPS  Take 300 mg by mouth daily    PROAIR  (90 Base) MCG/ACT inhaler  INHALE 2 PUFFS BY MOUTH 4 TIMES DAILY AS NEEDED FOR SHORTNESS OF BREATH.   Refills: 0          Current Discharge Medication List    STOP taking these medications    linaclotide (LINZESS) 145 MCG capsule  Comments:  Reason for Stopping:    Multiple Vitamin (MULTI VITAMIN PO)  Comments:  Reason for Stopping:          Time Spent on Discharge:  45 minutes were spent in patient examination, evaluation, counseling as well as medication reconciliation, prescriptions for required medications, discharge plan, and follow up.     Electronically signed by Margie oYu DO on 10/7/22 at 2:49 PM EDT

## 2022-10-07 NOTE — DISCHARGE INSTR - DIET

## 2022-10-07 NOTE — PROGRESS NOTES
Comprehensive Nutrition Assessment    Type and Reason for Visit:  Reassess    Nutrition Recommendations/Plan:   Continue current supplements and current regular diet     Malnutrition Assessment:  Malnutrition Status: At risk for malnutrition (Comment) (10/07/22 1312)    Context:  Social/Environmental Circumstances     Findings of the 6 clinical characteristics of malnutrition:  Energy Intake:  50% or less estimated energy requirements for 1 month or longer  Weight Loss:  No significant weight loss     Body Fat Loss:  No significant body fat loss     Muscle Mass Loss:  No significant muscle mass loss    Fluid Accumulation:  Unable to assess     Strength:  Not Performed    Nutrition Assessment:    Pts nutrition status is improving but intakes still remain <50%. Nutrition supplements in progress. Current bed scale weight reflects pt is a this UBW. Continue current POC    Nutrition Related Findings:    Pt presents with Acute alcoholic hepatitis, with abd pain and jaundice. Pmhx: cholelithiasis, alcohol abuse, HTN. Reported 15lb wt loss, poor appetite and intakes x 2 months since father passing but current bedscale wt reflects UBW. Last BM not documented. NS @125ml/hr. No edema noted. Labs (10/7): K=WNL; Mg=1.4; Gluc=; elevated bilirubin and LFTs, Ammonia=63. Meds include electrolyte replacement and lactulose. Wound Type: None       Current Nutrition Intake & Therapies:    Average Meal Intake: 26-50%  Average Supplements Intake: 26-50%  ADULT DIET; Regular  ADULT ORAL NUTRITION SUPPLEMENT; Lunch;  Fortified Pudding Oral Supplement  ADULT ORAL NUTRITION SUPPLEMENT; Dinner; Frozen Oral Supplement    Anthropometric Measures:  Height: 6' (182.9 cm)  Ideal Body Weight (IBW): 178 lbs (81 kg)    Admission Body Weight: 190 lb (86.2 kg) (stated 10/2)  Current Body Weight: 192 lb 12.8 oz (87.5 kg) (10/3),   Weight Source: Bed Scale  Current BMI (kg/m2): 26.1  Usual Body Weight: 188 lb (85.3 kg) (9/2021 bed)  % Weight Change (Calculated): 1.1                    BMI Categories: Overweight (BMI 25.0-29. 9)    Estimated Daily Nutrient Needs:  Energy Requirements Based On: Kcal/kg  Weight Used for Energy Requirements: Current  Energy (kcal/day): 1725-1900kcal (20-22kcal/kg)  Weight Used for Protein Requirements: Ideal  Protein (g/day): 97-105g (1.2-1.3g/kg)  Method Used for Fluid Requirements: 1 ml/kcal  Fluid (ml/day): ~1900ml    Nutrition Diagnosis:   Inadequate oral intake related to psychological cause or life stress as evidenced by poor intake prior to admission, intake 0-25%, weight loss    Nutrition Interventions:   Food and/or Nutrient Delivery: Continue Oral Nutrition Supplement, Continue Current Diet  Nutrition Education/Counseling: No recommendation at this time  Coordination of Nutrition Care: Continue to monitor while inpatient       Goals:  Previous Goal Met: Progressing toward Goal(s)  Goals: PO intake 75% or greater  Specify Other Goals: Maintain wt, improved lab values    Nutrition Monitoring and Evaluation:   Behavioral-Environmental Outcomes: None Identified  Food/Nutrient Intake Outcomes: Food and Nutrient Intake, Supplement Intake, IVF Intake  Physical Signs/Symptoms Outcomes: Biochemical Data, Weight, Meal Time Behavior    Discharge Planning:     Too soon to determine     Lum Judy, MS, RD, LD

## 2022-10-08 ENCOUNTER — HOSPITAL ENCOUNTER (OUTPATIENT)
Age: 52
Setting detail: OBSERVATION
Discharge: HOME | End: 2022-10-09
Attending: FAMILY MEDICINE | Admitting: INTERNAL MEDICINE
Payer: COMMERCIAL

## 2022-10-08 DIAGNOSIS — K70.10 ALCOHOLIC HEPATITIS WITHOUT ASCITES: ICD-10-CM

## 2022-10-08 DIAGNOSIS — K92.2 GASTROINTESTINAL HEMORRHAGE, UNSPECIFIED GASTROINTESTINAL HEMORRHAGE TYPE: Primary | ICD-10-CM

## 2022-10-08 LAB
ALBUMIN SERPL-MCNC: 2.6 G/DL (ref 3.5–4.6)
ALP BLD-CCNC: 177 U/L (ref 35–104)
ALT SERPL-CCNC: 45 U/L (ref 0–41)
AMMONIA: 20 UMOL/L (ref 16–60)
ANION GAP SERPL CALCULATED.3IONS-SCNC: 8 MEQ/L (ref 9–15)
ANISOCYTOSIS: ABNORMAL
AST SERPL-CCNC: 76 U/L (ref 0–40)
BASOPHILS ABSOLUTE: 0 K/UL (ref 0–0.2)
BASOPHILS RELATIVE PERCENT: 0.1 %
BILIRUB SERPL-MCNC: 11.2 MG/DL (ref 0.2–0.7)
BLOOD CULTURE, ROUTINE: NORMAL
BUN BLDV-MCNC: 7 MG/DL (ref 6–20)
CALCIUM SERPL-MCNC: 8 MG/DL (ref 8.5–9.9)
CHLORIDE BLD-SCNC: 106 MEQ/L (ref 95–107)
CO2: 25 MEQ/L (ref 20–31)
CREAT SERPL-MCNC: 0.28 MG/DL (ref 0.7–1.2)
CULTURE, BLOOD 2: NORMAL
EOSINOPHILS ABSOLUTE: 0.1 K/UL (ref 0–0.7)
EOSINOPHILS RELATIVE PERCENT: 1.8 %
GFR AFRICAN AMERICAN: >60
GFR NON-AFRICAN AMERICAN: >60
GLOBULIN: 3.4 G/DL (ref 2.3–3.5)
GLUCOSE BLD-MCNC: 112 MG/DL (ref 70–99)
HCT VFR BLD CALC: 34.4 % (ref 42–52)
HCT VFR BLD CALC: 36.5 % (ref 42–52)
HEMOGLOBIN: 12 G/DL (ref 14–18)
HEMOGLOBIN: 12.7 G/DL (ref 14–18)
HYPOCHROMIA: ABNORMAL
INR BLD: 1.9
LYMPHOCYTES ABSOLUTE: 1.1 K/UL (ref 1–4.8)
LYMPHOCYTES RELATIVE PERCENT: 14.7 %
MACROCYTES: ABNORMAL
MCH RBC QN AUTO: 36.9 PG (ref 27–31.3)
MCHC RBC AUTO-ENTMCNC: 34.7 % (ref 33–37)
MCV RBC AUTO: 106.3 FL (ref 80–100)
MONOCYTES ABSOLUTE: 0.8 K/UL (ref 0.2–0.8)
MONOCYTES RELATIVE PERCENT: 9.8 %
NEUTROPHILS ABSOLUTE: 5.7 K/UL (ref 1.4–6.5)
NEUTROPHILS RELATIVE PERCENT: 73.6 %
PDW BLD-RTO: 15.4 % (ref 11.5–14.5)
PLATELET # BLD: 140 K/UL (ref 130–400)
PLATELET SLIDE REVIEW: NORMAL
POIKILOCYTES: ABNORMAL
POTASSIUM SERPL-SCNC: 3.3 MEQ/L (ref 3.4–4.9)
PROTHROMBIN TIME: 21.7 SEC (ref 12.3–14.9)
RBC # BLD: 3.43 M/UL (ref 4.7–6.1)
REASON FOR REJECTION: NORMAL
REJECTED TEST: NORMAL
SLIDE REVIEW: ABNORMAL
SODIUM BLD-SCNC: 139 MEQ/L (ref 135–144)
TARGET CELLS: ABNORMAL
TOTAL PROTEIN: 6 G/DL (ref 6.3–8)
WBC # BLD: 7.7 K/UL (ref 4.8–10.8)

## 2022-10-08 PROCEDURE — 6370000000 HC RX 637 (ALT 250 FOR IP): Performed by: INTERNAL MEDICINE

## 2022-10-08 PROCEDURE — 96366 THER/PROPH/DIAG IV INF ADDON: CPT

## 2022-10-08 PROCEDURE — G0378 HOSPITAL OBSERVATION PER HR: HCPCS

## 2022-10-08 PROCEDURE — 82140 ASSAY OF AMMONIA: CPT

## 2022-10-08 PROCEDURE — 85610 PROTHROMBIN TIME: CPT

## 2022-10-08 PROCEDURE — A4216 STERILE WATER/SALINE, 10 ML: HCPCS | Performed by: INTERNAL MEDICINE

## 2022-10-08 PROCEDURE — 6360000002 HC RX W HCPCS: Performed by: INTERNAL MEDICINE

## 2022-10-08 PROCEDURE — 36415 COLL VENOUS BLD VENIPUNCTURE: CPT

## 2022-10-08 PROCEDURE — 2580000003 HC RX 258: Performed by: INTERNAL MEDICINE

## 2022-10-08 PROCEDURE — 99285 EMERGENCY DEPT VISIT HI MDM: CPT

## 2022-10-08 PROCEDURE — C9113 INJ PANTOPRAZOLE SODIUM, VIA: HCPCS | Performed by: INTERNAL MEDICINE

## 2022-10-08 PROCEDURE — 85025 COMPLETE CBC W/AUTO DIFF WBC: CPT

## 2022-10-08 PROCEDURE — 85018 HEMOGLOBIN: CPT

## 2022-10-08 PROCEDURE — 94664 DEMO&/EVAL PT USE INHALER: CPT

## 2022-10-08 PROCEDURE — 80053 COMPREHEN METABOLIC PANEL: CPT

## 2022-10-08 PROCEDURE — 96365 THER/PROPH/DIAG IV INF INIT: CPT

## 2022-10-08 PROCEDURE — 96375 TX/PRO/DX INJ NEW DRUG ADDON: CPT

## 2022-10-08 PROCEDURE — 85014 HEMATOCRIT: CPT

## 2022-10-08 RX ORDER — POTASSIUM CHLORIDE 7.45 MG/ML
10 INJECTION INTRAVENOUS
Status: DISPENSED | OUTPATIENT
Start: 2022-10-08 | End: 2022-10-08

## 2022-10-08 RX ORDER — ALBUTEROL SULFATE 90 UG/1
2 AEROSOL, METERED RESPIRATORY (INHALATION) EVERY 6 HOURS PRN
Status: DISCONTINUED | OUTPATIENT
Start: 2022-10-08 | End: 2022-10-09 | Stop reason: HOSPADM

## 2022-10-08 RX ORDER — SODIUM CHLORIDE, SODIUM LACTATE, POTASSIUM CHLORIDE, CALCIUM CHLORIDE 600; 310; 30; 20 MG/100ML; MG/100ML; MG/100ML; MG/100ML
INJECTION, SOLUTION INTRAVENOUS CONTINUOUS
Status: DISCONTINUED | OUTPATIENT
Start: 2022-10-08 | End: 2022-10-09 | Stop reason: HOSPADM

## 2022-10-08 RX ORDER — SODIUM CHLORIDE 0.9 % (FLUSH) 0.9 %
5-40 SYRINGE (ML) INJECTION EVERY 12 HOURS SCHEDULED
Status: DISCONTINUED | OUTPATIENT
Start: 2022-10-08 | End: 2022-10-09 | Stop reason: HOSPADM

## 2022-10-08 RX ORDER — LACTULOSE 10 G/15ML
10 SOLUTION ORAL 2 TIMES DAILY
Status: DISCONTINUED | OUTPATIENT
Start: 2022-10-08 | End: 2022-10-09 | Stop reason: HOSPADM

## 2022-10-08 RX ORDER — SODIUM CHLORIDE 9 MG/ML
INJECTION, SOLUTION INTRAVENOUS PRN
Status: DISCONTINUED | OUTPATIENT
Start: 2022-10-08 | End: 2022-10-09 | Stop reason: HOSPADM

## 2022-10-08 RX ORDER — ONDANSETRON 2 MG/ML
4 INJECTION INTRAMUSCULAR; INTRAVENOUS EVERY 6 HOURS PRN
Status: DISCONTINUED | OUTPATIENT
Start: 2022-10-08 | End: 2022-10-09 | Stop reason: HOSPADM

## 2022-10-08 RX ORDER — SODIUM CHLORIDE 0.9 % (FLUSH) 0.9 %
5-40 SYRINGE (ML) INJECTION PRN
Status: DISCONTINUED | OUTPATIENT
Start: 2022-10-08 | End: 2022-10-09 | Stop reason: HOSPADM

## 2022-10-08 RX ORDER — LACTULOSE 10 G/15ML
10 SOLUTION ORAL 3 TIMES DAILY
Status: DISCONTINUED | OUTPATIENT
Start: 2022-10-08 | End: 2022-10-08

## 2022-10-08 RX ORDER — ONDANSETRON 4 MG/1
4 TABLET, ORALLY DISINTEGRATING ORAL EVERY 8 HOURS PRN
Status: DISCONTINUED | OUTPATIENT
Start: 2022-10-08 | End: 2022-10-09 | Stop reason: HOSPADM

## 2022-10-08 RX ORDER — PREDNISONE 20 MG/1
40 TABLET ORAL DAILY
Status: DISCONTINUED | OUTPATIENT
Start: 2022-10-08 | End: 2022-10-09 | Stop reason: HOSPADM

## 2022-10-08 RX ADMIN — SODIUM CHLORIDE, POTASSIUM CHLORIDE, SODIUM LACTATE AND CALCIUM CHLORIDE: 600; 310; 30; 20 INJECTION, SOLUTION INTRAVENOUS at 19:07

## 2022-10-08 RX ADMIN — SODIUM CHLORIDE 80 MG: 9 INJECTION, SOLUTION INTRAMUSCULAR; INTRAVENOUS; SUBCUTANEOUS at 20:06

## 2022-10-08 RX ADMIN — SODIUM CHLORIDE, PRESERVATIVE FREE 10 ML: 5 INJECTION INTRAVENOUS at 20:25

## 2022-10-08 RX ADMIN — PREDNISONE 40 MG: 20 TABLET ORAL at 17:39

## 2022-10-08 RX ADMIN — POTASSIUM CHLORIDE 10 MEQ: 7.45 INJECTION INTRAVENOUS at 17:42

## 2022-10-08 RX ADMIN — POTASSIUM CHLORIDE 10 MEQ: 7.45 INJECTION INTRAVENOUS at 21:50

## 2022-10-08 RX ADMIN — POTASSIUM CHLORIDE 10 MEQ: 7.45 INJECTION INTRAVENOUS at 20:06

## 2022-10-08 RX ADMIN — LACTULOSE 10 G: 20 SOLUTION ORAL at 20:07

## 2022-10-08 ASSESSMENT — PAIN - FUNCTIONAL ASSESSMENT
PAIN_FUNCTIONAL_ASSESSMENT: NONE - DENIES PAIN
PAIN_FUNCTIONAL_ASSESSMENT: NONE - DENIES PAIN

## 2022-10-08 ASSESSMENT — ENCOUNTER SYMPTOMS
RESPIRATORY NEGATIVE: 1
EYES NEGATIVE: 1
VOMITING: 0
ABDOMINAL DISTENTION: 0
NAUSEA: 0
ANAL BLEEDING: 0
ABDOMINAL PAIN: 0
BLOOD IN STOOL: 1
DIARRHEA: 0
ALLERGIC/IMMUNOLOGIC NEGATIVE: 1
CONSTIPATION: 0

## 2022-10-08 NOTE — H&P
[facility]  MEDICINE    HISTORY AND PHYSICAL EXAM    PATIENT NAME:  Colby Atkinson    MRN:  [id number]  SERVICE DATE:  10/8/2022   SERVICE TIME:  5:02 PM    Primary Care Physician: Armand Gomez MD     SUBJECTIVE  CHIEF COMPLAINT:  GI bleed    HPI:  Colby Atkinson is a 46 y.o., , male who presents with complaint of GI bleed. PMH significant for  has a past medical history of Chest pressure, Cholelithiasis, Chronic alcoholic hepatitis, History of alcohol use, and Hypertension. .      Patient is a 49-year-old male presenting to the ER today with complaints of new BRBPR. He was just discharged from inpatient status yesterday 10/7/2022, after several days admission for acute decompensated alcoholic cirrhosis with jaundice, elevated direct and indirect bilirubinemia, possible colitis, coagulopathy without active bleeding, and hepatic encephalopathy with hyperammonemia corrected with new lactulose. He has no prior episodes of GI bleeding. Evaluation in the ED demonstrates stable vital signs, INR actually decreased from previous day, hemoglobin actually increased from previous day (12.7 up from 11.3), and other hepatic function labs stable to slightly improved compared to previous day. Patient reported relatively large-volume BRBPR despite increased hemoglobin level. No previous episodes of similar per patient. ED provider contacted gastroenterology on-call, discussed case, and GI service did recommend observation overnight in case of further bleeding and to trend hemoglobin level. Hospitalist service was consulted for re-admission. Patient is a full code. On examination, patient reports that overnight he had spontaneous BRBPR. He felt slightly lightheaded and diaphoretic at the time, and stated that the blood was \"pouring out\" and got all over the bathroom floor and in the toilet. A picture provided on his cell phone demonstrates small-volume BRBPR in the toilet bowl.   He denies any previous GI bleeding episodes. He denies any known hemorrhoids. He denies any abdominal or rectal pain with these episodes, describing only a deep pelvic \"achiness\" at the time, which is since resolved. He denies any current discomfort, lightheadedness, or other new/acute complaint.        PAST MEDICAL HISTORY:    Past Medical History:   Diagnosis Date    Chest pressure 3/8/2016    Cholelithiasis     Chronic alcoholic hepatitis     History of alcohol use     Hypertension 3/8/2016     PAST SURGICAL HISTORY:    Past Surgical History:   Procedure Laterality Date    CYST REMOVAL      WA LAP,CHOLECYSTECTOMY/GRAPH N/A 2/19/2018    LAPAROSCOPIC CHOLECYSTECTOMY WITH GRAMS performed by Daisy Nava MD at [facility] 16      Deviated Septum    TONSILLECTOMY       FAMILY HISTORY:    Family History   Problem Relation Age of Onset    Alcohol Abuse Father     Diabetes Brother     Heart Attack Paternal Grandfather     Crohn's Disease Maternal Aunt     Colon Cancer Neg Hx     Celiac Disease Neg Hx      SOCIAL HISTORY:    Social History     Socioeconomic History    Marital status:      Spouse name: Not on file    Number of children: Not on file    Years of education: Not on file    Highest education level: Not on file   Occupational History    Not on file   Tobacco Use    Smoking status: Every Day     Packs/day: 1.00     Types: Cigarettes    Smokeless tobacco: Never   Vaping Use    Vaping Use: Never used   Substance and Sexual Activity    Alcohol use: Not Currently    Drug use: No    Sexual activity: Not on file   Other Topics Concern    Not on file   Social History Narrative    Not on file     Social Determinants of Health     Financial Resource Strain: Not on file   Food Insecurity: Not on file   Transportation Needs: Not on file   Physical Activity: Not on file   Stress: Not on file   Social Connections: Not on file   Intimate Partner Violence: Not on file   Housing Stability: Not on file     MEDICATIONS:   Prior to Admission medications    Medication Sig Start Date End Date Taking? Authorizing Provider   predniSONE (DELTASONE) 10 MG tablet Take 4 tablets by mouth daily for 25 days, THEN 3 tablets daily for 4 days, THEN 2 tablets daily for 4 days, THEN 1 tablet daily for 4 days. 10/7/22 11/13/22  MELINDA Spence CNP   lactulose [facility]) 10 GM/15ML solution Take 15 mLs by mouth 3 times daily 10/7/22   MELINDA Spence CNP   Milk Thistle 300 MG CAPS Take 300 mg by mouth daily    Historical Provider, MD   PROAIR  (90 Base) MCG/ACT inhaler INHALE 2 PUFFS BY MOUTH 4 TIMES DAILY AS NEEDED FOR SHORTNESS OF BREATH. 7/20/17   Historical Provider, MD       ALLERGIES: Acetaminophen    REVIEW OF SYSTEM:   A full 12 point review of systems was completed, and was unremarkable except as specified above. OBJECTIVE    BP 97/66   Pulse 88   Temp 97.6 °F (36.4 °C) (Oral)   Resp 18   Ht 6' (1.829 m)   Wt 200 lb (90.7 kg)   SpO2 98%   BMI 27.12 kg/m²     PHYSICAL EXAM:   Constitutional: Adult male reclining in bed no acute distress. Spouse present at bedside. Head: NCAT  Eyes: PERRLA, EOMI. Moderate scleral icterus present. ENT: Hearing grossly intact. No rhinorrhea. Neck: Trachea midline, phonation normal  Cardiovascular: RRR. Warm and well perfused peripherally. Non-diaphoretic. Pulmonary: Normal rate and effort respiration on room air. Grossly CTAB. No coughing during exam.  Abdomen: Soft, nontense, non--distended. Normal bowel sounds appreciated. Nontender to gentle palpation in all 4 quadrants. Negative rebound, negative Rosing's. Neurologic: Alert, grossly oriented. No tremors appreciated. Psychiatric: Cooperative with exam.  Flattened affect. Integumentary: No gross bony abnormalities. Jaundice is still present. DATA:     Diagnostic tests reviewed for today's visit:    Most recent labs and imaging results reviewed.      LABS:    Recent Results (from the past 24 hour(s))   CBC with Auto Differential    Collection Time: 10/08/22  2:15 PM   Result Value Ref Range    WBC 7.7 4.8 - 10.8 K/uL    RBC 3.43 (L) 4.70 - 6.10 M/uL    Hemoglobin 12.7 (L) 14.0 - 18.0 g/dL    Hematocrit 36.5 (L) 42.0 - 52.0 %    .3 (H) 80.0 - 100.0 fL    MCH 36.9 (H) 27.0 - 31.3 pg    MCHC 34.7 33.0 - 37.0 %    RDW 15.4 (H) 11.5 - 14.5 %    Platelets 846 639 - 827 K/uL    PLATELET SLIDE REVIEW Normal     SLIDE REVIEW see below     Neutrophils % 73.6 %    Lymphocytes % 14.7 %    Monocytes % 9.8 %    Eosinophils % 1.8 %    Basophils % 0.1 %    Neutrophils Absolute 5.7 1.4 - 6.5 K/uL    Lymphocytes Absolute 1.1 1.0 - 4.8 K/uL    Monocytes Absolute 0.8 0.2 - 0.8 K/uL    Eosinophils Absolute 0.1 0.0 - 0.7 K/uL    Basophils Absolute 0.0 0.0 - 0.2 K/uL    Anisocytosis 1+     Macrocytes 2+     Hypochromia 1+     Poikilocytes 2+     Target Cells 2+    Comprehensive Metabolic Panel    Collection Time: 10/08/22  2:15 PM   Result Value Ref Range    Sodium 139 135 - 144 mEq/L    Potassium 3.3 (L) 3.4 - 4.9 mEq/L    Chloride 106 95 - 107 mEq/L    CO2 25 20 - 31 mEq/L    Anion Gap 8 (L) 9 - 15 mEq/L    Glucose 112 (H) 70 - 99 mg/dL    BUN 7 6 - 20 mg/dL    Creatinine 0.28 (L) 0.70 - 1.20 mg/dL    GFR Non-African American >60.0 >60    GFR  >60.0 >60    Calcium 8.0 (L) 8.5 - 9.9 mg/dL    Total Protein 6.0 (L) 6.3 - 8.0 g/dL    Albumin 2.6 (L) 3.5 - 4.6 g/dL    Total Bilirubin 11.2 (H) 0.2 - 0.7 mg/dL    Alkaline Phosphatase 177 (H) 35 - 104 U/L    ALT 45 (H) 0 - 41 U/L    AST 76 (H) 0 - 40 U/L    Globulin 3.4 2.3 - 3.5 g/dL   Protime-INR    Collection Time: 10/08/22  2:15 PM   Result Value Ref Range    Protime 21.7 (H) 12.3 - 14.9 sec    INR 1.9    SPECIMEN REJECTION    Collection Time: 10/08/22  2:28 PM   Result Value Ref Range    Rejected Test nh3     Reason for Rejection see below    Ammonia    Collection Time: 10/08/22  2:41 PM   Result Value Ref Range    Ammonia 20 16 - 60 umol/L       IMAGING:  No results found.    VTE Prophylaxis: SCDs    ASSESSMENT AND PLAN    Principal Problem:  New onset GI bleed in setting of coagulopathy secondary to decompensate alcoholic cirrhosis - Hgb actually higher on admission than at time of discharge on 10/7/22    Active Problems:  Acute decompensated alcoholic cirrhosis with direct >indirect bilirubinemia (without mechanical obstruction) - improving, on steroids per GI  Hyperammonemia on lactulose - improving  Hypokalemia - replacing  Jaundice due to above     Chronic Problems:  Recent possible colitis  Hypertension  Adjustment disorder  Hypophosphatemia    Plan:  Place in observation status per GI recommendations  Trend every 6 hour H&H  Transfuse as needed for goal Hgb >7.0  GI consulted in ED, to follow  Continue lactulose and steroids for acute decompensated alcoholic cirrhosis with recent discharge home on 10/7/2022, pending further GI recommendations  N.p.o. initially, pending GI recommendations  PPI bolus and infusion per protocol  Daily labs to include CBC, CMP, magnesium, PT/INR  Re-check fractionated bilirubin panel  SCDs for DVT prophylaxis  Continue/hold other home medications as ordered after medication history completed.       SIGNATURE: Arian Reilly DO  DATE: October 8, 2022  TIME: 5:02 PM

## 2022-10-08 NOTE — ED NOTES
Gave report to Emiliano Peguero on [facility] for room [address] [place] [address]  10/08/22 7749

## 2022-10-08 NOTE — PROGRESS NOTES
[facility] Respiratory Therapy Evaluation   Current Order:  ALBUTEROL 2 PUFFS Q6 PRN      Home Regimen: PRN      Ordering Physician: Seth Max  Re-evaluation Date:  N/A     Diagnosis: GI BLEED      Patient Status: Stable / Unstable + Physician notified    The following MDI Criteria must be met in order to convert aerosol to MDI with spacer. If unable to meet, MDI will be converted to aerosol:  []  Patient able to demonstrate the ability to use MDI effectively  []  Patient alert and cooperative  []  Patient able to take deep breath with 5-10 second hold  []  Medication(s) available in this delivery method   []  Peak flow greater than or equal to 200 ml/min            Current Order Substituted To  (same drug, same frequency)   Aerosol to MDI [] Albuterol Sulfate 0.083% unit dose by aerosol Albuterol Sulfate MDI 2 puffs by inhalation with spacer    [] Levalbuterol 1.25 mg unit dose by aerosol Levalbuterol MDI 2 puffs by inhalation with spacer    [] Levalbuterol 0.63 mg unit dose by aerosol Levalbuterol MDI 2 puffs by inhalation with spacer    [] Ipratropium Bromide 0.02% unit dose by aerosol Ipratropium Bromide MDI 2 puffs by inhalation with spacer    [] Duoneb (Ipratropium + Albuterol) unit dose by aerosol Ipratropium MDI + Albuterol MDI 2 puffs by inhalation w/spacer   MDI to Aerosol [] Albuterol Sulfate MDI Albuterol Sulfate 0.083% unit dose by aerosol    [] Levalbuterol MDI 2 puffs by inhalation Levalbuterol 1.25 mg unit dose by aerosol    [] Ipratropium Bromide MDI by inhalation Ipratropium Bromide 0.02% unit dose by aerosol    [] Combivent (Ipratropium + Albuterol) MDI by inhalation Duoneb (Ipratropium + Albuterol) unit dose by aerosol       Treatment Assessment [Frequency/Schedule]:  Change frequency to: __________________________________________________per Protocol, P&T, MEC      Points 0 1 2 3 4   Pulmonary Status  Non-Smoker  []   Smoking history   < 20 pack years  []   Smoking history  ?  20 pack years  [x] Pulmonary Disorder  (acute or chronic)  []   Severe or Chronic w/ Exacerbation  []     Surgical Status No [x]   Surgeries     General []   Surgery Lower []   Abdominal Thoracic or []   Upper Abdominal Thoracic with  PulmonaryDisorder  []     Chest X-ray Clear/Not  Ordered     [x]  Chronic Changes  Results Pending  []  Infiltrates, atelectasis, pleural effusion, or edema  []  Infiltrates in more than one lobe []  Infiltrate + Atelectasis, &/or pleural effusion  []    Respiratory Pattern Regular,  RR = 12-20 [x]  Increased,  RR = 21-25 []  JACOBSEN, irregular,  or RR = 26-30 []  Decreased FEV1  or RR = 31-35 []  Severe SOB, use  of accessory muscles, or RR ? 35  []    Mental Status Alert, oriented,  Cooperative [x]  Confused but Follows commands []  Lethargic or unable to follow commands []  Obtunded  []  Comatose  []    Breath Sounds Clear to  auscultation  [x]  Decreased unilaterally or  in bases only []  Decreased  bilaterally  []  Crackles or intermittent wheezes []  Wheezes []    Cough Strong, Spontan., & nonproductive [x]  Strong,  spontaneous, &  productive []  Weak,  Nonproductive []  Weak, productive or  with wheezes []  No spontaneous  cough or may require suctioning []    Level of Activity Ambulatory [x]  Ambulatory w/ Assist  []  Non-ambulatory []  Paraplegic []  Quadriplegic []    Total    Score:___2____     Triage Score:__5______      Tri       Triage:     1. (>20) Freq: Q3    2. (16-20) Freq: Q4   3. (11-15) Freq: QID & Albuterol Q2 PRN    4. (6-10) Freq: TID & Albuterol Q2 PRN    5. (0-5) Freq Q4prn

## 2022-10-08 NOTE — ED TRIAGE NOTES
The patient came to ED for rectal bleeding that began today. Pt states he was recently discharged from this hospital for acute alcoholic hepatitis and has been taking his lactulose as prescribed. Pt has frequent bowl movements that are loose, brown but streaked with bright red. A&Ox4. Pt appears jaundice. Respirations even and unlabored.

## 2022-10-08 NOTE — ED PROVIDER NOTES
[address] [facility]  eMERGENCY dEPARTMENT eNCOUnter      Pt Name: Franchesca Fraire  MRN: [id number]  [place] 1970  Date of evaluation: 10/8/2022  Provider: Sarah Morillo MD    [address]       Chief Complaint   Patient presents with    Rectal Bleeding     Brown stool with bright red blood began today         HISTORY OF PRESENT ILLNESS   (Location/Symptom, Timing/Onset,Context/Setting, Quality, Duration, Modifying Factors, Severity)  Note limiting factors. Franchesca Fraire is a 46 y.o. male who presents to the emergency department Gi bleed     Patient is a 51-year-old male presenting to the ER today with complaints of new BRBPR. He was just discharged from inpatient status yesterday 10/7/2022, after several days admission for acute decompensated alcoholic cirrhosis with jaundice, elevated direct and indirect bilirubinemia, possible colitis, coagulopathy without active bleeding, and hepatic encephalopathy with hyperammonemia corrected with new lactulose. He has no prior episodes of GI bleeding. Patient is currently at home with lactulose and prednisone state after he had his first dose of lactulose this morning have a bowel movement and had large amount of large amount of blood in the toilet felt dizzy after no bowel movement since. The history is provided by the patient and the spouse. NursingNotes were reviewed. REVIEW OF SYSTEMS    (2-9 systems for level 4, 10 or more for level 5)     Review of Systems   Constitutional:  Positive for appetite change and fatigue. HENT: Negative. Eyes: Negative. Respiratory: Negative. Cardiovascular: Negative. Gastrointestinal:  Positive for blood in stool. Negative for abdominal distention, abdominal pain, anal bleeding, constipation, diarrhea, nausea and vomiting. Endocrine: Negative. Genitourinary: Negative. Musculoskeletal: Negative. Allergic/Immunologic: Negative. Neurological: Negative. Hematological: Negative. commands  [place] Coma Scale Score: 15 @FLOW([id number])@      PHYSICAL EXAM    (up to 7 for level 4, 8 or more for level 5)     ED Triage Vitals [10/08/22 1355]   BP Temp Temp Source Heart Rate Resp SpO2 Height Weight   97/66 97.6 °F (36.4 °C) Oral 88 18 98 % 6' (1.829 m) 200 lb (90.7 kg)       Physical Exam  Vitals and nursing note reviewed. Constitutional:       Appearance: He is well-developed. HENT:      Head: Normocephalic and atraumatic. Right Ear: External ear normal.      Left Ear: External ear normal.      Nose: Nose normal.   Eyes:      Pupils: Pupils are equal, round, and reactive to light. Cardiovascular:      Rate and Rhythm: Normal rate and regular rhythm. Heart sounds: Normal heart sounds. Pulmonary:      Effort: Pulmonary effort is normal. No respiratory distress. Breath sounds: Normal breath sounds. No wheezing or rales. Chest:      Chest wall: No tenderness. Abdominal:      General: Bowel sounds are normal.      Palpations: Abdomen is soft. Genitourinary:     Rectum: No external hemorrhoid or internal hemorrhoid. Comments: Skin except noted with obvious blood around the rectum with some clots  Musculoskeletal:         General: Normal range of motion. Cervical back: Normal range of motion and neck supple. Skin:     General: Skin is warm and dry. Neurological:      Mental Status: He is alert and oriented to person, place, and time. Cranial Nerves: No cranial nerve deficit. Sensory: No sensory deficit. Motor: No abnormal muscle tone. Coordination: Coordination normal.      Deep Tendon Reflexes: Reflexes normal.   Psychiatric:         Behavior: Behavior normal.         Thought Content:  Thought content normal.         Judgment: Judgment normal.       DIAGNOSTIC RESULTS     EKG: All EKG's are interpreted by the Emergency Department Physician who either signs or Co-signsthis chart in the absence of a cardiologist.        RADIOLOGY:   B[place] filmimages such as CT, Ultrasound and MRI are read by the radiologist. Plain radiographic images are visualized and preliminarily interpreted by the emergency physician with the below findings:        Interpretation per the Radiologist below, if available at the time ofthis note:    No orders to display         ED BEDSIDE ULTRASOUND:   Performed by ED Physician - none    LABS:  Labs Reviewed   CBC WITH AUTO DIFFERENTIAL - Abnormal; Notable for the following components:       Result Value    RBC 3.43 (*)     Hemoglobin 12.7 (*)     Hematocrit 36.5 (*)     .3 (*)     MCH 36.9 (*)     RDW 15.4 (*)     All other components within normal limits   COMPREHENSIVE METABOLIC PANEL - Abnormal; Notable for the following components:    Potassium 3.3 (*)     Anion Gap 8 (*)     Glucose 112 (*)     Creatinine 0.28 (*)     Calcium 8.0 (*)     Total Protein 6.0 (*)     Albumin 2.6 (*)     Total Bilirubin 11.2 (*)     Alkaline Phosphatase 177 (*)     ALT 45 (*)     AST 76 (*)     All other components within normal limits   PROTIME-INR - Abnormal; Notable for the following components:    Protime 21.7 (*)     All other components within normal limits   SPECIMEN REJECTION   AMMONIA   BILIRUBIN TOTAL DIRECT & INDIRECT   CBC   HEMOGLOBIN AND HEMATOCRIT   HEMOGLOBIN AND HEMATOCRIT   PROTIME-INR   APTT   IRON AND TIBC   COMPREHENSIVE METABOLIC PANEL W/ REFLEX TO MG FOR LOW K   AMMONIA       All other labs were within normal range or not returned as of this dictation.     EMERGENCY DEPARTMENT COURSE and DIFFERENTIAL DIAGNOSIS/MDM:   Vitals:    Vitals:    10/08/22 1355 10/08/22 1700 10/08/22 1743 10/08/22 1839   BP: 97/66 104/76 130/74 119/75   Pulse: 88 74 78 75   Resp: 18 16 20 18   Temp: 97.6 °F (36.4 °C)   98.1 °F (36.7 °C)   TempSrc: Oral   Oral   SpO2: 98% 98% 98% 100%   Weight: 200 lb (90.7 kg)      Height: 6' (1.829 m)                   MDM  Number of Diagnoses or Management Options  Gastrointestinal hemorrhage, unspecified gastrointestinal hemorrhage type  Diagnosis management comments: With known alcoholic hepatitis was admitted to the hospital and released last night but today have a large blood after he had a bowel movement from her first dose of lactulose in the ED obvious timmy blood on rectal exam but hemodynamically stable labs stable patient will be admitted under the hospitalist service with GI consultation for new onset GI bleed with known alcoholic hepatitis       Amount and/or Complexity of Data Reviewed  Clinical lab tests: ordered and reviewed  Tests in the radiology section of CPT®: ordered and reviewed          CONSULTS:  IP CONSULT TO GI    PROCEDURES:  Unless otherwise noted below, none     Procedures    FINAL IMPRESSION      1. Gastrointestinal hemorrhage, unspecified gastrointestinal hemorrhage type          DISPOSITION/PLAN   DISPOSITION Admitted 10/08/2022 04:56:57 PM      PATIENT REFERRED TO:  No follow-up provider specified.     DISCHARGE MEDICATIONS:  Current Discharge Medication List             (Please note thatportions of this note were completed with a voice recognition program.  Efforts were made to edit the dictations but occasionally words are mis-transcribed.)    Mikala Benavides MD (electronically signed)  Attending Emergency Physician          Duran Raymond MD  10/08/22 1432

## 2022-10-09 VITALS
WEIGHT: 200 LBS | OXYGEN SATURATION: 100 % | SYSTOLIC BLOOD PRESSURE: 103 MMHG | HEIGHT: 72 IN | TEMPERATURE: 97.3 F | BODY MASS INDEX: 27.09 KG/M2 | DIASTOLIC BLOOD PRESSURE: 67 MMHG | HEART RATE: 61 BPM | RESPIRATION RATE: 18 BRPM

## 2022-10-09 LAB
ALBUMIN SERPL-MCNC: 2.4 G/DL (ref 3.5–4.6)
ALP BLD-CCNC: 150 U/L (ref 35–104)
ALT SERPL-CCNC: 45 U/L (ref 0–41)
AMMONIA: 29 UMOL/L (ref 16–60)
ANION GAP SERPL CALCULATED.3IONS-SCNC: 7 MEQ/L (ref 9–15)
APTT: 35 SEC (ref 24.4–36.8)
AST SERPL-CCNC: 77 U/L (ref 0–40)
BILIRUB SERPL-MCNC: 11.3 MG/DL (ref 0.2–0.7)
BILIRUBIN DIRECT: 8 MG/DL (ref 0–0.4)
BILIRUBIN, INDIRECT: 3.3 MG/DL (ref 0–0.6)
BUN BLDV-MCNC: 8 MG/DL (ref 6–20)
CALCIUM SERPL-MCNC: 8 MG/DL (ref 8.5–9.9)
CHLORIDE BLD-SCNC: 101 MEQ/L (ref 95–107)
CO2: 26 MEQ/L (ref 20–31)
CREAT SERPL-MCNC: 0.38 MG/DL (ref 0.7–1.2)
GFR AFRICAN AMERICAN: >60
GFR NON-AFRICAN AMERICAN: >60
GLOBULIN: 3.5 G/DL (ref 2.3–3.5)
GLUCOSE BLD-MCNC: 125 MG/DL (ref 70–99)
HCT VFR BLD CALC: 34.1 % (ref 42–52)
HCT VFR BLD CALC: 35.6 % (ref 42–52)
HEMOGLOBIN: 11.4 G/DL (ref 14–18)
HEMOGLOBIN: 12.1 G/DL (ref 14–18)
INR BLD: 2.1
IRON SATURATION: 56 % (ref 20–55)
IRON: 62 UG/DL (ref 59–158)
MCH RBC QN AUTO: 36.5 PG (ref 27–31.3)
MCHC RBC AUTO-ENTMCNC: 34 % (ref 33–37)
MCV RBC AUTO: 107.4 FL (ref 80–100)
PDW BLD-RTO: 15.7 % (ref 11.5–14.5)
PLATELET # BLD: 142 K/UL (ref 130–400)
POTASSIUM REFLEX MAGNESIUM: 3.8 MEQ/L (ref 3.4–4.9)
PROTHROMBIN TIME: 23.7 SEC (ref 12.3–14.9)
RBC # BLD: 3.32 M/UL (ref 4.7–6.1)
SODIUM BLD-SCNC: 134 MEQ/L (ref 135–144)
TOTAL IRON BINDING CAPACITY: 111 UG/DL (ref 250–450)
TOTAL PROTEIN: 5.9 G/DL (ref 6.3–8)
UNSATURATED IRON BINDING CAPACITY: 49 UG/DL (ref 112–347)
WBC # BLD: 6.9 K/UL (ref 4.8–10.8)

## 2022-10-09 PROCEDURE — 2580000003 HC RX 258: Performed by: INTERNAL MEDICINE

## 2022-10-09 PROCEDURE — 85014 HEMATOCRIT: CPT

## 2022-10-09 PROCEDURE — 85027 COMPLETE CBC AUTOMATED: CPT

## 2022-10-09 PROCEDURE — 80053 COMPREHEN METABOLIC PANEL: CPT

## 2022-10-09 PROCEDURE — 82140 ASSAY OF AMMONIA: CPT

## 2022-10-09 PROCEDURE — 83550 IRON BINDING TEST: CPT

## 2022-10-09 PROCEDURE — 85610 PROTHROMBIN TIME: CPT

## 2022-10-09 PROCEDURE — 36415 COLL VENOUS BLD VENIPUNCTURE: CPT

## 2022-10-09 PROCEDURE — 6370000000 HC RX 637 (ALT 250 FOR IP): Performed by: INTERNAL MEDICINE

## 2022-10-09 PROCEDURE — G0378 HOSPITAL OBSERVATION PER HR: HCPCS

## 2022-10-09 PROCEDURE — 82247 BILIRUBIN TOTAL: CPT

## 2022-10-09 PROCEDURE — 96366 THER/PROPH/DIAG IV INF ADDON: CPT

## 2022-10-09 PROCEDURE — 85018 HEMOGLOBIN: CPT

## 2022-10-09 PROCEDURE — 85730 THROMBOPLASTIN TIME PARTIAL: CPT

## 2022-10-09 PROCEDURE — 83540 ASSAY OF IRON: CPT

## 2022-10-09 PROCEDURE — 82248 BILIRUBIN DIRECT: CPT

## 2022-10-09 PROCEDURE — 99214 OFFICE O/P EST MOD 30 MIN: CPT | Performed by: INTERNAL MEDICINE

## 2022-10-09 RX ORDER — PHYTONADIONE 5 MG/1
5 TABLET ORAL ONCE
Status: COMPLETED | OUTPATIENT
Start: 2022-10-09 | End: 2022-10-09

## 2022-10-09 RX ORDER — HYDROCORTISONE 25 MG/G
CREAM TOPICAL 2 TIMES DAILY
Status: DISCONTINUED | OUTPATIENT
Start: 2022-10-09 | End: 2022-10-09 | Stop reason: HOSPADM

## 2022-10-09 RX ORDER — PREDNISONE 10 MG/1
TABLET ORAL
Qty: 124 TABLET | Refills: 0
Start: 2022-10-09 | End: 2022-11-15

## 2022-10-09 RX ORDER — MECOBALAMIN 5000 MCG
5 TABLET,DISINTEGRATING ORAL NIGHTLY
Status: DISCONTINUED | OUTPATIENT
Start: 2022-10-09 | End: 2022-10-09 | Stop reason: HOSPADM

## 2022-10-09 RX ADMIN — PHYTONADIONE 5 MG: 5 TABLET ORAL at 09:30

## 2022-10-09 RX ADMIN — HYDROCORTISONE: 25 CREAM TOPICAL at 11:21

## 2022-10-09 RX ADMIN — PREDNISONE 40 MG: 20 TABLET ORAL at 09:30

## 2022-10-09 RX ADMIN — SODIUM CHLORIDE, PRESERVATIVE FREE 10 ML: 5 INJECTION INTRAVENOUS at 09:31

## 2022-10-09 RX ADMIN — SODIUM CHLORIDE, POTASSIUM CHLORIDE, SODIUM LACTATE AND CALCIUM CHLORIDE: 600; 310; 30; 20 INJECTION, SOLUTION INTRAVENOUS at 06:17

## 2022-10-09 RX ADMIN — LACTULOSE 10 G: 20 SOLUTION ORAL at 09:30

## 2022-10-09 NOTE — PROGRESS NOTES
0930: Assessment completed. VSS. Patient denies any bloody Bms thus far. Pt up independently in room with cane. Denies further needs. Safety maintained. Comfort measures provided     1100: Elodia NP on floor and is ok with discharge. 1255: Discharge instructions gone over with patient. Patient verbalized understanding of instructions, medications, and follow-ups. Patient has no questions for this RN. IV was removed. Patient ambulatory and wanting to walk down to car.      Electronically signed by Kerry Alexandre RN on 10/9/22 at 12:57 PM EDT

## 2022-10-09 NOTE — DISCHARGE SUMMARY
Discharge Summary    Date: 10/9/2022  Patient Name: Keerthi Pulliam    YOB: 1970     Age: 46 y.o. Admit Date: 10/8/2022  Discharge Date: 10/9/2022  Discharge Condition: Good    Admission Diagnosis  GI bleed [K92.2]; Gastrointestinal hemorrhage, unspecified gastrointestinal hemorrhage type [K92.2]      Discharge Diagnosis  Principal Problem:    GI bleed  Resolved Problems:    * No resolved hospital problems. [facility] Stay  Narrative of Hospital Course:  80-year-old male re-admitted to observation status on 10/8/2022 s/p recent discharge on 10/7/2022 following inpatient admission for decompensated alcoholic cirrhosis with jaundice and transaminitis. Patient had went home, experienced a small volume BRBPR, and been instructed over the phone by outpatient provider to come back to the ED for evaluation. Vital signs were stable, hemoglobin was actually improved from previous day time of admission, platelets were improved, transaminases were continuing to improve. ED provider had contacted on-call gastroenterology, who recommended that patient be monitored overnight for possible new acute GI bleed. Patient was admitted to observation and monitored overnight with Q6hr H&H, which demonstrated a stable hemoglobin. There were no further hematochezia episodes. Vital signs remained stable. Gastroenterology evaluated in the morning, were of the opinion that patient likely had previously undiagnosed internal hemorrhoid with non-significant new bleeding. Recommended Anusol and sitz bath's, and discharged with outpatient follow-up for labs and clinic follow-up as previously scheduled. Patient was deemed appropriate for discharge home on 10/9/2022 with aforementioned arrangements already in place. He will continue to take the previously diagnosed prednisone taper (per GI service), as well as lactulose for hyperammonemia with hepatic encephalopathy (improved).   He will additionally need outpatient follow-up with PCP clinic. Consultants:  IP CONSULT TO GI    Surgeries/procedures Performed:      Treatments:    IV Hydration        Discharge Plan/Disposition:  Home    Hospital/Incidental Findings Requiring Follow Up:    Patient Instructions:    Diet:    Activity:Activity as Tolerated  For number of days (if applicable): Other Instructions:    Provider Follow-Up:   No follow-ups on file.      Significant Diagnostic Studies:    Recent Labs:  Admission on 10/08/2022  WBC                                           Date: 10/08/2022  Value: 7.7         Ref range: 4.8 - 10.8 K/uL    Status: Final  RBC                                           Date: 10/08/2022  Value: 3.43 (A)    Ref range: 4.70 - 6.10 M/uL   Status: Final  Hemoglobin                                    Date: 10/08/2022  Value: 12.7 (A)    Ref range: 14.0 - 18.0 g/dL   Status: Final  Hematocrit                                    Date: 10/08/2022  Value: 36.5 (A)    Ref range: 42.0 - 52.0 %      Status: Final  MCV                                           Date: 10/08/2022  Value: 106.3 (A)   Ref range: 80.0 - 100.0 fL    Status: Final  MCH                                           Date: 10/08/2022  Value: 36.9 (A)    Ref range: 27.0 - 31.3 pg     Status: Final  MCHC                                          Date: 10/08/2022  Value: 34.7        Ref range: 33.0 - 37.0 %      Status: Final  RDW                                           Date: 10/08/2022  Value: 15.4 (A)    Ref range: 11.5 - 14.5 %      Status: Final  Platelets                                     Date: 10/08/2022  Value: 140         Ref range: 130 - 400 K/uL     Status: Final  PLATELET SLIDE REVIEW                         Date: 10/08/2022  Value: Normal        Status: Final  SLIDE REVIEW                                  Date: 10/08/2022  Value: see below     Status: Final                Comment: Slide review agrees with reported results  Neutrophils %                                 Date: 10/08/2022  Value: 73.6        Ref range: %                  Status: Final  Lymphocytes %                                 Date: 10/08/2022  Value: 14.7        Ref range: %                  Status: Final  Monocytes %                                   Date: 10/08/2022  Value: 9.8         Ref range: %                  Status: Final  Eosinophils %                                 Date: 10/08/2022  Value: 1.8         Ref range: %                  Status: Final  Basophils %                                   Date: 10/08/2022  Value: 0.1         Ref range: %                  Status: Final  Neutrophils Absolute                          Date: 10/08/2022  Value: 5.7         Ref range: 1.4 - 6.5 K/uL     Status: Final  Lymphocytes Absolute                          Date: 10/08/2022  Value: 1.1         Ref range: 1.0 - 4.8 K/uL     Status: Final  Monocytes Absolute                            Date: 10/08/2022  Value: 0.8         Ref range: 0.2 - 0.8 K/uL     Status: Final  Eosinophils Absolute                          Date: 10/08/2022  Value: 0.1         Ref range: 0.0 - 0.7 K/uL     Status: Final  Basophils Absolute                            Date: 10/08/2022  Value: 0.0         Ref range: 0.0 - 0.2 K/uL     Status: Final  Anisocytosis                                  Date: 10/08/2022  Value: 1+            Status: Final  Macrocytes                                    Date: 10/08/2022  Value: 2+            Status: Final  Hypochromia                                   Date: 10/08/2022  Value: 1+            Status: Final  Poikilocytes                                  Date: 10/08/2022  Value: 2+            Status: Final  Target Cells                                  Date: 10/08/2022  Value: 2+            Status: Final  Sodium                                        Date: 10/08/2022  Value: 139         Ref range: 135 - 144 mEq/L    Status: Final  Potassium                                     Date: 10/08/2022  Value: 3.3 (A)     Ref range: 3.4 - 4.9 mEq/L    Status: Final  Chloride                                      Date: 10/08/2022  Value: 106         Ref range: 95 - 107 mEq/L     Status: Final  CO2                                           Date: 10/08/2022  Value: 25          Ref range: 20 - 31 mEq/L      Status: Final  Anion Gap                                     Date: 10/08/2022  Value: 8 (A)       Ref range: 9 - 15 mEq/L       Status: Final  Glucose                                       Date: 10/08/2022  Value: 112 (A)     Ref range: 70 - 99 mg/dL      Status: Final  BUN                                           Date: 10/08/2022  Value: 7           Ref range: 6 - 20 mg/dL       Status: Final  Creatinine                                    Date: 10/08/2022  Value: 0.28 (A)    Ref range: 0.70 - 1.20 mg/dL  Status: Final                Comment: Specimen icterus has exceeded the interference as defined by  Roche. Result may be affected. GFR Non-                      Date: 10/08/2022  Value: >60.0       Ref range: >60                Status: Final                Comment: >60 mL/min/1.73m2 EGFR, calc. for ages 25 and older using the  MDRD formula (not corrected for weight), is valid for stable  renal function. GFR                           Date: 10/08/2022  Value: >60.0       Ref range: >60                Status: Final                Comment: >60 mL/min/1.73m2 EGFR, calc. for ages 25 and older using the  MDRD formula (not corrected for weight), is valid for stable  renal function.     Calcium                                       Date: 10/08/2022  Value: 8.0 (A)     Ref range: 8.5 - 9.9 mg/dL    Status: Final  Total Protein                                 Date: 10/08/2022  Value: 6.0 (A)     Ref range: 6.3 - 8.0 g/dL     Status: Final  Albumin                                       Date: 10/08/2022  Value: 2.6 (A)     Ref range: 3.5 - 4.6 g/dL     Status: Final  Total Bilirubin                               Date: 10/08/2022  Value: 11.2 (A)    Ref range: 0.2 - 0.7 mg/dL    Status: Final  Alkaline Phosphatase                          Date: 10/08/2022  Value: 177 (A)     Ref range: 35 - 104 U/L       Status: Final  ALT                                           Date: 10/08/2022  Value: 45 (A)      Ref range: 0 - 41 U/L         Status: Final  AST                                           Date: 10/08/2022  Value: 76 (A)      Ref range: 0 - 40 U/L         Status: Final  Globulin                                      Date: 10/08/2022  Value: 3.4         Ref range: 2.3 - 3.5 g/dL     Status: Final  Protime                                       Date: 10/08/2022  Value: 21.7 (A)    Ref range: 12.3 - 14.9 sec    Status: Final  INR                                           Date: 10/08/2022  Value: 1.9           Status: Final  Rejected Test                                 Date: 10/08/2022  Value: nh3           Status: Final  Reason for Rejection                          Date: 10/08/2022  Value: see below     Status: Final                Comment: Unable to perform testing; specimen special handling  requirements not followed. To perform testing the  specimen will need to be recollected.  Sp handle    Ammonia                                       Date: 10/08/2022  Value: 20          Ref range: 16 - 60 umol/L     Status: Final  Total Bilirubin                               Date: 10/09/2022  Value: 11.3 (A)    Ref range: 0.2 - 0.7 mg/dL    Status: Final  Bilirubin, Direct                             Date: 10/09/2022  Value: 8.0 (A)     Ref range: 0.0 - 0.4 mg/dL    Status: Final  Bilirubin, Indirect                           Date: 10/09/2022  Value: 3.3 (A)     Ref range: 0.0 - 0.6 mg/dL    Status: Final  WBC                                           Date: 10/09/2022  Value: 6.9         Ref range: 4.8 - 10.8 K/uL    Status: Final  RBC                                           Date: 10/09/2022  Value: 3.32 (A)    Ref range: 4.70 - 6.10 M/uL Status: Final  Hemoglobin                                    Date: 10/09/2022  Value: 12.1 (A)    Ref range: 14.0 - 18.0 g/dL   Status: Final  Hematocrit                                    Date: 10/09/2022  Value: 35.6 (A)    Ref range: 42.0 - 52.0 %      Status: Final  MCV                                           Date: 10/09/2022  Value: 107.4 (A)   Ref range: 80.0 - 100.0 fL    Status: Final  MCH                                           Date: 10/09/2022  Value: 36.5 (A)    Ref range: 27.0 - 31.3 pg     Status: Final  MCHC                                          Date: 10/09/2022  Value: 34.0        Ref range: 33.0 - 37.0 %      Status: Final  RDW                                           Date: 10/09/2022  Value: 15.7 (A)    Ref range: 11.5 - 14.5 %      Status: Final  Platelets                                     Date: 10/09/2022  Value: 142         Ref range: 130 - 400 K/uL     Status: Final  Hemoglobin                                    Date: 10/08/2022  Value: 12.0 (A)    Ref range: 14.0 - 18.0 g/dL   Status: Final  Hematocrit                                    Date: 10/08/2022  Value: 34.4 (A)    Ref range: 42.0 - 52.0 %      Status: Final  Protime                                       Date: 10/09/2022  Value: 23.7 (A)    Ref range: 12.3 - 14.9 sec    Status: Final  INR                                           Date: 10/09/2022  Value: 2.1           Status: Final  aPTT                                          Date: 10/09/2022  Value: 35.0        Ref range: 24.4 - 36.8 sec    Status: Final                Comment: Effective 11/4/2020:  Heparin Therapeutic Range: 64.0 - 98.0 seconds.     Sodium                                        Date: 10/09/2022  Value: 134 (A)     Ref range: 135 - 144 mEq/L    Status: Final  Potassium reflex Magnesium                    Date: 10/09/2022  Value: 3.8         Ref range: 3.4 - 4.9 mEq/L    Status: Final  Chloride                                      Date: 10/09/2022  Value: 101 Ref range: 95 - 107 mEq/L     Status: Final  CO2                                           Date: 10/09/2022  Value: 26          Ref range: 20 - 31 mEq/L      Status: Final  Anion Gap                                     Date: 10/09/2022  Value: 7 (A)       Ref range: 9 - 15 mEq/L       Status: Final  Glucose                                       Date: 10/09/2022  Value: 125 (A)     Ref range: 70 - 99 mg/dL      Status: Final  BUN                                           Date: 10/09/2022  Value: 8           Ref range: 6 - 20 mg/dL       Status: Final  Creatinine                                    Date: 10/09/2022  Value: 0.38 (A)    Ref range: 0.70 - 1.20 mg/dL  Status: Final                Comment: Specimen icterus has exceeded the interference as defined by  Roche. Result may be affected. GFR Non-                      Date: 10/09/2022  Value: >60.0       Ref range: >60                Status: Final                Comment: >60 mL/min/1.73m2 EGFR, calc. for ages 25 and older using the  MDRD formula (not corrected for weight), is valid for stable  renal function. GFR                           Date: 10/09/2022  Value: >60.0       Ref range: >60                Status: Final                Comment: >60 mL/min/1.73m2 EGFR, calc. for ages 25 and older using the  MDRD formula (not corrected for weight), is valid for stable  renal function.     Calcium                                       Date: 10/09/2022  Value: 8.0 (A)     Ref range: 8.5 - 9.9 mg/dL    Status: Final  Total Protein                                 Date: 10/09/2022  Value: 5.9 (A)     Ref range: 6.3 - 8.0 g/dL     Status: Final  Albumin                                       Date: 10/09/2022  Value: 2.4 (A)     Ref range: 3.5 - 4.6 g/dL     Status: Final  Alkaline Phosphatase                          Date: 10/09/2022  Value: 150 (A)     Ref range: 35 - 104 U/L       Status: Final  ALT Date: 10/09/2022  Value: 45 (A)      Ref range: 0 - 41 U/L         Status: Final  AST                                           Date: 10/09/2022  Value: 77 (A)      Ref range: 0 - 40 U/L         Status: Final  Globulin                                      Date: 10/09/2022  Value: 3.5         Ref range: 2.3 - 3.5 g/dL     Status: Final  Ammonia                                       Date: 10/09/2022  Value: 29          Ref range: 16 - 60 umol/L     Status: Final  Hemoglobin                                    Date: 10/09/2022  Value: 11.4 (A)    Ref range: 14.0 - 18.0 g/dL   Status: Final  Hematocrit                                    Date: 10/09/2022  Value: 34.1 (A)    Ref range: 42.0 - 52.0 %      Status: Final  ------------    Radiology last 7 days:  CT ABDOMEN PELVIS WO CONTRAST Additional Contrast? None    Result Date: 10/2/2022  Stranding of the peritoneal fat planes is visualized most prominent in the right perirenal fat planes and in the right paracolic gutter. Cannot optimally evaluate for renal mass or focal inflammatory changes in the absence of intravenous contrast material. Hepatic steatosis. Degenerative joint changes. XR CHEST (2 VW)    Result Date: 10/3/2022  Mild linear atelectasis and or scarring at the bases. US ABDOMEN LIMITED    Result Date: 10/3/2022  Diffusely echogenic liver parenchyma consistent with parenchymal disease process such as hepatic steatosis. No focal liver lesion Questionable focus 1.6 cm somewhat high isoechoic to hypoechoic anteriorly adjacent to the pancreatic head favoring adjacent bowel however underlying soft tissue mass would not be entirely excluded.         Pending Labs     Order Current Status    Iron and TIBC In process        Discharge Medications    Current Discharge Medication List        Current Discharge Medication List    CONTINUE these medications which have CHANGED    predniSONE (DELTASONE) 10 MG tablet  Take 4 tablets by mouth daily for 25 days, THEN 3 tablets daily for 4 days, THEN 2 tablets daily for 4 days, THEN 1 tablet daily for 4 days. Four tablets once per day for 25 days, then 3 tablets once per day for 4 days, then 2 tablets once per day for 4 days, then 1 tablet once per day for 4 days. Asha Cardoso: 124 tablet Refills: 0  Associated Diagnoses:Alcoholic hepatitis without ascites          Current Discharge Medication List    CONTINUE these medications which have NOT CHANGED    lactulose (CHRONULAC) 10 GM/15ML solution  Take 15 mLs by mouth 3 times daily  Qty: 946 mL Refills: 3  Associated Diagnoses:Hepatic encephalopathy    Milk Thistle 300 MG CAPS  Take 300 mg by mouth daily    PROAIR  (90 Base) MCG/ACT inhaler  INHALE 2 PUFFS BY MOUTH 4 TIMES DAILY AS NEEDED FOR SHORTNESS OF BREATH. Refills: 0          Current Discharge Medication List        Time Spent on Discharge:  35 minutes were spent in patient examination, evaluation, counseling as well as medication reconciliation, prescriptions for required medications, discharge plan, and follow up.     Electronically signed by Lata Atkinson DO on 10/9/22 at 12:27 PM EDT

## 2022-10-09 NOTE — PLAN OF CARE
Problem: Discharge Planning  Goal: Discharge to home or other facility with appropriate resources  Recent Flowsheet Documentation  Taken 10/9/2022 0310 by Farzana Noland RN  Discharge to home or other facility with appropriate resources: Identify barriers to discharge with patient and caregiver  10/9/2022 0210 by Farzana Noland RN  Outcome: Progressing     Problem: Safety - Adult  Goal: Free from fall injury  Outcome: Progressing     Problem: ABCDS Injury Assessment  Goal: Absence of physical injury  Outcome: Progressing  Flowsheets (Taken 10/9/2022 0305)  Absence of Physical Injury: Implement safety measures based on patient assessment

## 2022-10-09 NOTE — CONSULTS
Consult to Gastroenterology  Consult performed by: Jaci Love MD  Consult ordered by: Lata Atkinson DO    Patient Name: Ulisses Beckwith Date: 10/8/2022  2:01 PM  MR #: [id number]  : 1970    Attending Physician: Lata Atkinson DO  Reason for consult: Decompensated cirrhosis  History Obtained From:  patient, electronic medical record, staff nurse  History of Presenting Illness:      Seb Leach is a 46 y.o. male on hospital day 0 with PMH of Chest pressure, Cholelithiasis, Chronic alcoholic hepatitis, History of alcohol use, and Hypertension. PSH includes Tonsillectomy; cyst removal; rhinoplasty; and pr lap,cholecystectomy/graph (N/A, 2018). Family history negative for GI malignancies. Social history includes current nicotine and alcohol use. No illicit drug use. Admitted with new onset of bright red blood per rectum. Recent inpatient hospitalization from 10/2/2022 - 10/7/2022 with acute hepatic encephalopathy in the setting of decompensated alcoholic cirrhosis. During his previous admission he was coagulopathic, had elevated transaminases and bilirubin along with elevated ammonia level. He was initiated on steroids and lactulose with improvement and eventual discharge home. Patient reports no prior issues with bright red blood per rectum. States he was taking his lactulose and steroids as prescribed with multiple bowel movements. Patient reports he had his usual morning bowel movement, however noticed bright red blood with wiping, on his stool, and in the toilet. He additionally reports another stool this morning, small amount of bright red blood noted mainly on toilet paper. Patient reports his stool is brown with blood on the stool. He denies rectal pain, nausea/vomiting, hematemesis, abdominal pain or melena.    On admit, WBCs 7.7, Hgb 12.7, .3, platelets 000, potassium 3.3, BUN 7, creatinine 0.28, albumin 2.6, total bilirubin 11.2, alk phos 177, ALT 45, AST 76, INR 1.9, ammonia 20,  Today, Hgb 12.1, platelets 680, INR 2.1, ammonia 29, sodium 134, glucose 125, BUN 8, creatinine 0.38, albumin 2.4, alk phos 150, ALT 45, AST 77, iron/TIBC in process.     Previous endoscopic history:  None    History:      Past Medical History:   Diagnosis Date    Chest pressure 3/8/2016    Cholelithiasis     Chronic alcoholic hepatitis     History of alcohol use     Hypertension 3/8/2016     Past Surgical History:   Procedure Laterality Date    CYST REMOVAL      FL LAP,CHOLECYSTECTOMY/GRAPH N/A 2/19/2018    LAPAROSCOPIC CHOLECYSTECTOMY WITH GRAMS performed by Pam Arteaga MD at [facility] 16      Deviated Septum    TONSILLECTOMY       Family History  Family History   Problem Relation Age of Onset    Alcohol Abuse Father     Diabetes Brother     Heart Attack Paternal Grandfather     Crohn's Disease Maternal Aunt     Colon Cancer Neg Hx     Celiac Disease Neg Hx      [] Unable to obtain due to ventilated and/ or neurologic status  Social History     Socioeconomic History    Marital status:      Spouse name: Not on file    Number of children: Not on file    Years of education: Not on file    Highest education level: Not on file   Occupational History    Not on file   Tobacco Use    Smoking status: Every Day     Packs/day: 1.00     Types: Cigarettes    Smokeless tobacco: Never   Vaping Use    Vaping Use: Never used   Substance and Sexual Activity    Alcohol use: Not Currently    Drug use: No    Sexual activity: Not on file   Other Topics Concern    Not on file   Social History Narrative    Not on file     Social Determinants of Health     Financial Resource Strain: Not on file   Food Insecurity: Not on file   Transportation Needs: Not on file   Physical Activity: Not on file   Stress: Not on file   Social Connections: Not on file   Intimate Partner Violence: Not on file   Housing Stability: Not on file      [] Unable to obtain due to ventilated and/ or neurologic status  Home Medications: Medications Prior to Admission: lactulose (CHRONULAC) 10 GM/15ML solution, Take 15 mLs by mouth 3 times daily  Milk Thistle 300 MG CAPS, Take 300 mg by mouth daily  PROAIR  (90 Base) MCG/ACT inhaler, INHALE 2 PUFFS BY MOUTH 4 TIMES DAILY AS NEEDED FOR SHORTNESS OF BREATH. Current Hospital Medications:   Scheduled Meds:   melatonin  5 mg Oral Nightly    sodium chloride flush  5-40 mL IntraVENous 2 times per day    pantoprazole (PROTONIX) 40 mg injection  40 mg IntraVENous Q24H    predniSONE  40 mg Oral Daily    lactulose  10 g Oral BID     Continuous Infusions:   sodium chloride      lactated ringers 100 mL/hr at 10/09/22 0617     PRN Meds:.sodium chloride flush, sodium chloride, ondansetron **OR** ondansetron, albuterol sulfate HFA   sodium chloride      lactated ringers 100 mL/hr at 10/09/22 0617      Allergies: Allergies   Allergen Reactions    Acetaminophen Other (See Comments)     Liver issues  intolerance due to Liver      Review of Systems:    [x] CV, Resp, Neuro, , and all other systems reviewed and negative other than listed in HPI. Objective Findings:     Vitals:   Vitals:    10/08/22 1355 10/08/22 1700 10/08/22 1743 10/08/22 1839   BP: 97/66 104/76 130/74 119/75   Pulse: 88 74 78 75   Resp: 18 16 20 18   Temp: 97.6 °F (36.4 °C)   98.1 °F (36.7 °C)   TempSrc: Oral   Oral   SpO2: 98% 98% 98% 100%   Weight: 200 lb (90.7 kg)      Height: 6' (1.829 m)         Physical Examination:  General: Alert, oriented, in no acute distress  HEENT: Normocephalic, + scleral icterus. Neck: soft/supple  Heart: Regular, no murmur, no rub/gallop. Lungs: Clear to ascultation, no rales/wheezing/rhonchi. equal chest wall excursion. Abdomen: Abdomen soft, non-tender. BS normal. No masses. Rectal exam: +large external hemorrhoids, brown stool noted on GIL  Extremities: no clubbing/cyanosis, no edema. Skin: Warm, dry, normal turgor, no rash, no bruise, no petichiae.  +jaundice  Neuro: without focal deficit, moves all extremities   Psych: normal affect    Results/ Medications reviewed 10/9/2022, 8:05 AM     Laboratory, Microbiology, Pathology, Radiology, Cardiology, Medications and Transcriptions reviewed  Scheduled Meds:   melatonin  5 mg Oral Nightly    sodium chloride flush  5-40 mL IntraVENous 2 times per day    pantoprazole (PROTONIX) 40 mg injection  40 mg IntraVENous Q24H    predniSONE  40 mg Oral Daily    lactulose  10 g Oral BID     Continuous Infusions:   sodium chloride      lactated ringers 100 mL/hr at 10/09/22 0617       Recent Labs     10/07/22  1030 10/08/22  1415 10/08/22  2251 10/09/22  0533   WBC 7.3 7.7  --  6.9   HGB 11.3* 12.7* 12.0* 12.1*   HCT 33.0* 36.5* 34.4* 35.6*   .7* 106.3*  --  107.4*   * 140  --  142     Recent Labs     10/07/22  1030 10/08/22  1415 10/09/22  0533    139 134*   K 3.5 3.3* 3.8   * 106 101   CO2 25 25 26   BUN 7 7 8   CREATININE 0.20* 0.28* 0.38*     Recent Labs     10/07/22  1030 10/08/22  1415 10/09/22  0533   AST 80* 76* 77*   ALT 48* 45* 45*   BILIDIR  --   --  8.0*   BILITOT 13.1* 11.2* 11.3*   ALKPHOS 157* 177* 150*     No results for input(s): LIPASE, AMYLASE in the last 72 hours. Recent Labs     10/07/22  1030 10/08/22  1415 10/09/22  0533   PROT 5.9* 6.0* 5.9*   INR 2.0 1.9 2.1     CT ABDOMEN PELVIS WO CONTRAST Additional Contrast? None    Result Date: 10/2/2022  EXAMINATION: CT OF THE ABDOMEN AND PELVIS WITHOUT CONTRAST 10/2/2022 1:56 pm TECHNIQUE: CT of the abdomen and pelvis was performed without the administration of intravenous contrast. Multiplanar reformatted images are provided for review. Automated exposure control, iterative reconstruction, and/or weight based adjustment of the mA/kV was utilized to reduce the radiation dose to as low as reasonably achievable. COMPARISON: General [id number].  HISTORY: ORDERING SYSTEM PROVIDED HISTORY: ab pain TECHNOLOGIST PROVIDED HISTORY: Additional Contrast?->None Reason for exam:->ab pain Decision Support Exception - unselect if not a suspected or confirmed emergency medical condition->Emergency Medical Condition (MA) What reading provider will be dictating this exam?->CRC FINDINGS: Lower Chest: Limited evaluation of the lower lung fields demonstrates unremarkable bronchovascular markings with no evidence of focal infiltrate or consolidation. No evidence of acute cardiopulmonary disease is seen. Organs: The liver demonstrates decreased attenuation, no evidence of masses no evidence of intrahepatic biliary dilatation is seen. The gallbladder is surgically absent The common bile duct is unremarkable. The pancreas and spleen demonstrate no evidence of masses. The adrenal glands demonstrate unremarkable contours with no evidence of masses. Mild stranding of the right perirenal planes and along the right pericolic gutter is visualized epicenter appears to be along the ventral aspect of the midpole of the right kidney visualized on coronal series 601, image 51 and axial series 2 image 85, no evidence of a well-defined mass is seen. No evidence of renal stones. No evidence of right hydronephrosis or hydroureter is seen. The left kidney demonstrates no evidence of mass, nervous of stones. GI/Bowel: Small hiatus hernia is seen. The stomach is unremarkable, no evidence of masses. No significant distention of the small and large bowel loops is visualized. The appendix is visualized and is unremarkable. Abundance of stool is visualized in the large bowel. Pelvis: The urinary bladder is not optimally distended. The prostate gland is unremarkable. No evidence of free fluid in the pelvis. No evidence of pelvic mass is seen. Vessels[de-identified] Atherosclerotic calcifications visualized in the abdominal/pelvic vessels. Peritoneum/Retroperitoneum: No evidence of free fluid or air within the peritoneal cavity. Bones/Soft Tissues: No evidence of lytic or sclerotic bone lesion is seen. Abdominal wall soft tissues are unremarkable. Degenerative changes of the lumbar spine visualized. Postop is an intended position of the L5 and S1 vertebral bodies with will disc spaces seen previous     Stranding of the peritoneal fat planes is visualized most prominent in the right perirenal fat planes and in the right paracolic gutter. Cannot optimally evaluate for renal mass or focal inflammatory changes in the absence of intravenous contrast material. Hepatic steatosis. Degenerative joint changes. XR CHEST (2 VW)    Result Date: 10/3/2022  EXAMINATION: TWO XRAY VIEWS OF THE CHEST 10/3/2022 11:16 am COMPARISON: None. HISTORY: ORDERING SYSTEM PROVIDED HISTORY: cough and chest pain TECHNOLOGIST PROVIDED HISTORY: Reason for exam:->cough and chest pain What reading provider will be dictating this exam?->CRC FINDINGS: There is mild linear atelectasis and or scarring at the bases. Normal heart and pulmonary vascularity. Neither costophrenic angle is blunted. Mild linear atelectasis and or scarring at the bases. US ABDOMEN LIMITED    Result Date: 10/3/2022  EXAMINATION: RIGHT UPPER QUADRANT ULTRASOUND 10/3/2022 7:35 am COMPARISON: None. HISTORY: ORDERING SYSTEM PROVIDED HISTORY: abn LFT's TECHNOLOGIST PROVIDED HISTORY: Reason for exam:->abn LFT's What reading provider will be dictating this exam?->CRC FINDINGS: LIVER:  The liver demonstrates diffusely increased echogenicity without evidence of intrahepatic biliary ductal dilatation. BILIARY SYSTEM:  Gallbladder surgically absent Common bile duct is within normal limits measuring 3 mm. RIGHT KIDNEY: The right kidney is grossly unremarkable without evidence of hydronephrosis. PANCREAS:  Questionable focus anterior to the pancreatic head measuring 1.6 cm somewhat indeterminate on limited imaging of this region favoring bowel however indeterminate OTHER: No evidence of right upper quadrant ascites.      Diffusely echogenic liver parenchyma consistent with parenchymal disease process such as hepatic steatosis. No focal liver lesion Questionable focus 1.6 cm somewhat high isoechoic to hypoechoic anteriorly adjacent to the pancreatic head favoring adjacent bowel however underlying soft tissue mass would not be entirely excluded. Impression:   46 y.o. male admitted with new onset BRBPR yesterday. Recent inpatient hospitalization 10/2/2022 - 10/7/2022 w/acute hepatic encephalopathy in the setting of decompensated alcoholic cirrhosis, D/C'd home on steroids and lactulose. Patient noting multiple bowel movements with lactulose, BRBPR yesterday and small amount this morning, no rectal pain. GIL +brown stool and large external hemorrhoids. No prior colonoscopy. Upon previous discharge on 10/7/2022, Hgb 11.3, platelets 638, INR 2.0. On admit, Hgb 12.7, INR 1.9, platelets 328, ammonia 20. Today, Hgb stable at 12.1, INR 31.    43-year-old male with new onset bright red blood per rectum since yesterday, likely related to large external hemorrhoids (noted on GIL). Also in differential includes diverticular versus AVM versus other although these are less likely as patient's Hgb has improved since his recent discharge from the hospital.    Plan:   1-Alcoholic hepatitis with decompensated cirrhosis  -During patient's prior admit, meld score 25, Madrey discriminant factor 58 and he was started on prednisone daily. Continue prednisone 40 mg daily, currently on day 5 of 7. Then calculate Lille score. If Lille score greater than 0.45, stop treatment (indicates 6 month survival is 25%).  If Lille score less than 0.45, continue for 28 days with 2 week taper  2-No ascites:  2 g sodium diet  3-EGD for variceal surveillance   No prior history of EGD, timing to be determined at follow up this week  4-HCC screening  Recent imaging negative for liver mass  5-History of hepatic encephalopathy  -Continue lactulose, titrate to 2-3 soft bowel movements daily  6-Bright red blood per rectum  -Likely r/t large hemorrhoids, recommend topical Anusol cream for the next 7 days then switch to OTC hemorrhoidal care. Avoid constipation, keep stool soft and regular  - Avoid dry wipes, patient advised to use water to clean perineum after a bowel movement. Use wet wipes/towel (cotton). - Sitz baths  - Calmol 4 PRN for perianal care in between flares  - Patient will require colonoscopy in the future (none prior), timing to be determined at follow-up    Comments: Thank you for allowing us to participate in the care of this patient. Will sign off. Have patient follow up with Dr. Kanu Pascual and GLORIA Lujan Fuelelizabeth this week in the GI clinic. Please call if questions or concerns arise. Electronically signed by Tommy Maldonado MD on 10/9/2022 at 8:05 AM    Please note this report has been partially produced using speech recognition software and may cause contain errors related to that system including grammar, punctuation and spelling as well as words and phrases that may seem inappropriate. If there are questions or concerns please feel free to contact me to clarify.

## 2022-10-09 NOTE — CARE COORDINATION
MET WITH PATIENT, FROM HOME WITH SPOUSE. PER GI , POSSIBLE WORKUP AS OUTPATIENT D/T HGB STABLE -POSSIBLE HEMORRHOIDS . PATIENT REQUESTING DC TO HOME. WILL FOLLOW.

## 2022-10-10 ENCOUNTER — OFFICE VISIT (OUTPATIENT)
Dept: FAMILY MEDICINE CLINIC | Age: 52
End: 2022-10-10
Payer: COMMERCIAL

## 2022-10-10 VITALS
OXYGEN SATURATION: 100 % | DIASTOLIC BLOOD PRESSURE: 76 MMHG | BODY MASS INDEX: 27.09 KG/M2 | WEIGHT: 200 LBS | HEIGHT: 72 IN | SYSTOLIC BLOOD PRESSURE: 110 MMHG | HEART RATE: 76 BPM

## 2022-10-10 DIAGNOSIS — K70.10 ACUTE ALCOHOLIC HEPATITIS: ICD-10-CM

## 2022-10-10 DIAGNOSIS — Z09 HOSPITAL DISCHARGE FOLLOW-UP: ICD-10-CM

## 2022-10-10 DIAGNOSIS — K92.2 GASTROINTESTINAL HEMORRHAGE, UNSPECIFIED GASTROINTESTINAL HEMORRHAGE TYPE: Primary | ICD-10-CM

## 2022-10-10 PROCEDURE — 1111F DSCHRG MED/CURRENT MED MERGE: CPT | Performed by: NURSE PRACTITIONER

## 2022-10-10 PROCEDURE — 3017F COLORECTAL CA SCREEN DOC REV: CPT | Performed by: NURSE PRACTITIONER

## 2022-10-10 PROCEDURE — G8419 CALC BMI OUT NRM PARAM NOF/U: HCPCS | Performed by: NURSE PRACTITIONER

## 2022-10-10 PROCEDURE — 4004F PT TOBACCO SCREEN RCVD TLK: CPT | Performed by: NURSE PRACTITIONER

## 2022-10-10 PROCEDURE — G8427 DOCREV CUR MEDS BY ELIG CLIN: HCPCS | Performed by: NURSE PRACTITIONER

## 2022-10-10 PROCEDURE — G8484 FLU IMMUNIZE NO ADMIN: HCPCS | Performed by: NURSE PRACTITIONER

## 2022-10-10 PROCEDURE — 99214 OFFICE O/P EST MOD 30 MIN: CPT | Performed by: NURSE PRACTITIONER

## 2022-10-10 SDOH — ECONOMIC STABILITY: FOOD INSECURITY: WITHIN THE PAST 12 MONTHS, THE FOOD YOU BOUGHT JUST DIDN'T LAST AND YOU DIDN'T HAVE MONEY TO GET MORE.: NEVER TRUE

## 2022-10-10 SDOH — ECONOMIC STABILITY: FOOD INSECURITY: WITHIN THE PAST 12 MONTHS, YOU WORRIED THAT YOUR FOOD WOULD RUN OUT BEFORE YOU GOT MONEY TO BUY MORE.: NEVER TRUE

## 2022-10-10 ASSESSMENT — PATIENT HEALTH QUESTIONNAIRE - PHQ9
SUM OF ALL RESPONSES TO PHQ QUESTIONS 1-9: 0
SUM OF ALL RESPONSES TO PHQ9 QUESTIONS 1 & 2: 0
SUM OF ALL RESPONSES TO PHQ QUESTIONS 1-9: 0
2. FEELING DOWN, DEPRESSED OR HOPELESS: 0
1. LITTLE INTEREST OR PLEASURE IN DOING THINGS: 0

## 2022-10-10 ASSESSMENT — SOCIAL DETERMINANTS OF HEALTH (SDOH): HOW HARD IS IT FOR YOU TO PAY FOR THE VERY BASICS LIKE FOOD, HOUSING, MEDICAL CARE, AND HEATING?: NOT HARD AT ALL

## 2022-10-10 NOTE — PROGRESS NOTES
Post-Discharge Transitional Care Follow Up      Elicia Saucedo   YOB: 1970    Date of Office Visit:  10/10/2022  Date of Hospital Admission: 10/8/22  Date of Hospital Discharge: 10/9/22  Readmission Risk Score (high >=14%. Medium >=10%):Readmission Risk Score: 13.3      Care management risk score Rising risk (score 2-5) and Complex Care (Scores >=6): No Risk Score On File     Non face to face  following discharge, date last encounter closed (first attempt may have been earlier): *No documented post hospital discharge outreach found in the last 14 days     Call initiated 2 business days of discharge: *No response recorded in the last 14 days     Gastrointestinal hemorrhage, unspecified gastrointestinal hemorrhage type  Hospital discharge follow-up  -     MI DISCHARGE MEDS RECONCILED W/ CURRENT OUTPATIENT MED LIST  Acute alcoholic hepatitis    Medical Decision Making: moderate complexity  No follow-ups on file. Subjective:   HPI    Inpatient course: Discharge summary reviewed- see chart. Was admitted with GI bleed recently   No current concerns. Had been admitted for acute alcoholic hepatitis prior to that. He notes that overall he just feels rundown. Knows that he needs to quit drinking. Has started \"psychotherapy\"    Taking all current medications with no issues. Interval history/Current status: has been feeling run down. Decreased appetite. Sleep issues. Patient Active Problem List   Diagnosis    Hypertension    Chest pressure    Chronic alcoholic hepatitis    History of alcohol use    Chronic cholecystitis with calculus    Abnormal LFTs    Acute alcoholic hepatitis    GI bleed       Medications listed as ordered at the time of discharge from hospital     Medication List            Accurate as of October 10, 2022 11:59 PM. If you have any questions, ask your nurse or doctor.                 CONTINUE taking these medications      lactulose 10 GM/15ML solution  Commonly known as: CHRONULAC  Take 15 mLs by mouth 3 times daily     Milk Thistle 300 MG Caps     predniSONE 10 MG tablet  Commonly known as: DELTASONE  Take 4 tablets by mouth daily for 25 days, THEN 3 tablets daily for 4 days, THEN 2 tablets daily for 4 days, THEN 1 tablet daily for 4 days. Four tablets once per day for 25 days, then 3 tablets once per day for 4 days, then 2 tablets once per day for 4 days, then 1 tablet once per day for 4 days. .  Start taking on: October 9, 2022     ProAir  (90 Base) MCG/ACT inhaler  Generic drug: albuterol sulfate HFA               Medications marked \"taking\" at this time  Outpatient Medications Marked as Taking for the 10/10/22 encounter (Office Visit) with Christel Opitz, APRN - CNP   Medication Sig Dispense Refill    predniSONE (DELTASONE) 10 MG tablet Take 4 tablets by mouth daily for 25 days, THEN 3 tablets daily for 4 days, THEN 2 tablets daily for 4 days, THEN 1 tablet daily for 4 days. Four tablets once per day for 25 days, then 3 tablets once per day for 4 days, then 2 tablets once per day for 4 days, then 1 tablet once per day for 4 days. . 124 tablet 0    lactulose (CHRONULAC) 10 GM/15ML solution Take 15 mLs by mouth 3 times daily 946 mL 3    Milk Thistle 300 MG CAPS Take 300 mg by mouth daily      PROAIR  (90 Base) MCG/ACT inhaler INHALE 2 PUFFS BY MOUTH 4 TIMES DAILY AS NEEDED FOR SHORTNESS OF BREATH.  0        Medications patient taking as of now reconciled against medications ordered at time of hospital discharge: Yes    Review of Systems   Constitutional:  Negative for fatigue. Respiratory:  Negative for cough and shortness of breath. Cardiovascular:  Negative for chest pain. Gastrointestinal:  Negative for constipation, diarrhea and nausea. Objective:    /76   Pulse 76   Ht 6' (1.829 m)   Wt 200 lb (90.7 kg)   SpO2 100%   BMI 27.12 kg/m²   Physical Exam  Vitals and nursing note reviewed. Constitutional:       Appearance: Normal appearance.  He is normal weight. HENT:      Head: Normocephalic. Mouth/Throat:      Mouth: Mucous membranes are moist.      Pharynx: Oropharynx is clear. Eyes:      General: Scleral icterus present. Extraocular Movements: Extraocular movements intact. Pupils: Pupils are equal, round, and reactive to light. Cardiovascular:      Rate and Rhythm: Normal rate and regular rhythm. Pulses: Normal pulses. Heart sounds: Normal heart sounds. Pulmonary:      Effort: Pulmonary effort is normal.      Breath sounds: Normal breath sounds. Musculoskeletal:      Cervical back: Neck supple. Skin:     General: Skin is warm. Neurological:      General: No focal deficit present. Mental Status: He is alert and oriented to person, place, and time. Mental status is at baseline. Psychiatric:         Mood and Affect: Mood normal.         Behavior: Behavior normal.         Thought Content: Thought content normal.         Judgment: Judgment normal.       An electronic signature was used to authenticate this note.   --MELINDA Cho - CNP

## 2022-10-11 ASSESSMENT — ENCOUNTER SYMPTOMS
DIARRHEA: 0
CONSTIPATION: 0
SHORTNESS OF BREATH: 0
COUGH: 0
NAUSEA: 0

## 2022-10-13 ENCOUNTER — OFFICE VISIT (OUTPATIENT)
Dept: GASTROENTEROLOGY | Age: 52
End: 2022-10-13
Payer: COMMERCIAL

## 2022-10-13 VITALS
HEART RATE: 69 BPM | BODY MASS INDEX: 27.26 KG/M2 | WEIGHT: 201 LBS | SYSTOLIC BLOOD PRESSURE: 116 MMHG | OXYGEN SATURATION: 98 % | DIASTOLIC BLOOD PRESSURE: 60 MMHG

## 2022-10-13 DIAGNOSIS — K74.60 CIRRHOSIS OF LIVER WITHOUT ASCITES, UNSPECIFIED HEPATIC CIRRHOSIS TYPE (HCC): Primary | ICD-10-CM

## 2022-10-13 DIAGNOSIS — K74.60 CIRRHOSIS OF LIVER WITHOUT ASCITES, UNSPECIFIED HEPATIC CIRRHOSIS TYPE (HCC): ICD-10-CM

## 2022-10-13 LAB
ALBUMIN SERPL-MCNC: 2.8 G/DL (ref 3.5–4.6)
ALP BLD-CCNC: 180 U/L (ref 35–104)
ALT SERPL-CCNC: 58 U/L (ref 0–41)
ANION GAP SERPL CALCULATED.3IONS-SCNC: 9 MEQ/L (ref 9–15)
AST SERPL-CCNC: 82 U/L (ref 0–40)
BILIRUB SERPL-MCNC: 8.6 MG/DL (ref 0.2–0.7)
BUN BLDV-MCNC: 12 MG/DL (ref 6–20)
CALCIUM SERPL-MCNC: 8.3 MG/DL (ref 8.5–9.9)
CHLORIDE BLD-SCNC: 106 MEQ/L (ref 95–107)
CO2: 23 MEQ/L (ref 20–31)
CREAT SERPL-MCNC: 0.47 MG/DL (ref 0.7–1.2)
GFR AFRICAN AMERICAN: >60
GFR NON-AFRICAN AMERICAN: >60
GLOBULIN: 3.5 G/DL (ref 2.3–3.5)
GLUCOSE BLD-MCNC: 177 MG/DL (ref 70–99)
HCT VFR BLD CALC: 35.4 % (ref 42–52)
HEMOGLOBIN: 11.7 G/DL (ref 14–18)
INR BLD: 1.6
MCH RBC QN AUTO: 35.5 PG (ref 27–31.3)
MCHC RBC AUTO-ENTMCNC: 33 % (ref 33–37)
MCV RBC AUTO: 107.5 FL (ref 80–100)
PDW BLD-RTO: 15.5 % (ref 11.5–14.5)
PLATELET # BLD: 137 K/UL (ref 130–400)
POTASSIUM SERPL-SCNC: 4.3 MEQ/L (ref 3.4–4.9)
PROTHROMBIN TIME: 19.3 SEC (ref 12.3–14.9)
RBC # BLD: 3.29 M/UL (ref 4.7–6.1)
SODIUM BLD-SCNC: 138 MEQ/L (ref 135–144)
TOTAL PROTEIN: 6.3 G/DL (ref 6.3–8)
WBC # BLD: 7.4 K/UL (ref 4.8–10.8)

## 2022-10-13 PROCEDURE — G8419 CALC BMI OUT NRM PARAM NOF/U: HCPCS | Performed by: INTERNAL MEDICINE

## 2022-10-13 PROCEDURE — 99214 OFFICE O/P EST MOD 30 MIN: CPT | Performed by: INTERNAL MEDICINE

## 2022-10-13 PROCEDURE — G8484 FLU IMMUNIZE NO ADMIN: HCPCS | Performed by: INTERNAL MEDICINE

## 2022-10-13 PROCEDURE — 4004F PT TOBACCO SCREEN RCVD TLK: CPT | Performed by: INTERNAL MEDICINE

## 2022-10-13 PROCEDURE — G8427 DOCREV CUR MEDS BY ELIG CLIN: HCPCS | Performed by: INTERNAL MEDICINE

## 2022-10-13 PROCEDURE — 3017F COLORECTAL CA SCREEN DOC REV: CPT | Performed by: INTERNAL MEDICINE

## 2022-10-13 PROCEDURE — 1111F DSCHRG MED/CURRENT MED MERGE: CPT | Performed by: INTERNAL MEDICINE

## 2022-10-13 ASSESSMENT — ENCOUNTER SYMPTOMS
COLOR CHANGE: 0
DIARRHEA: 0
SHORTNESS OF BREATH: 0
VOMITING: 0
NAUSEA: 0
CHEST TIGHTNESS: 0
ABDOMINAL DISTENTION: 0
TROUBLE SWALLOWING: 0
EYE REDNESS: 0
PHOTOPHOBIA: 0
CONSTIPATION: 0
RECTAL PAIN: 0
VOICE CHANGE: 0
WHEEZING: 0
BLOOD IN STOOL: 0
EYE PAIN: 0
ABDOMINAL PAIN: 0

## 2022-10-13 NOTE — PROGRESS NOTES
Subjective:      Patient ID: Raegan Rutherford is a 46 y.o. male who presents today for:  Chief Complaint   Patient presents with    Follow-up       HPI  Patient came in today for further evaluation management. Patient admitted to hospital owing to alcoholic hepatitis with underlying cirrhosis. Patient had recent hospital stay owing to confusion and was diagnosed with alcoholic hepatitis. Patient had significant jaundice coagulopathy regimen. Her hospital stay has alcohol withdrawal and hepatic encephalopathy and was initiated on lactulose rifaximin. Patient had increased TF and subsequently was initiated on prednisone. Otherwise clinically improved during hospital stay with improvement of mental status and overall decreasing trend of dynamic bilirubin. Patient will was discharged home subsequently was readmitted owing to blood per rectum that attributed to hemorrhoids hemoglobin was stable throughout and was discharged home with routine follow-up. Patient came in today for further evaluation management and assessment guarding the need of steroid use given the alcoholic hepatitis. Patient denies hematemesis, melena hematochezia. No increase abdominal girth no confusion reported at this time. Patient has been abstinent from alcohol  Narrative of Hospital Course:  70-year-old male re-admitted to observation status on 10/8/2022 s/p recent discharge on 10/7/2022 following inpatient admission for decompensated alcoholic cirrhosis with jaundice and transaminitis. Patient had went home, experienced a small volume BRBPR, and been instructed over the phone by outpatient provider to come back to the ED for evaluation. Vital signs were stable, hemoglobin was actually improved from previous day time of admission, platelets were improved, transaminases were continuing to improve. ED provider had contacted on-call gastroenterology, who recommended that patient be monitored overnight for possible new acute GI bleed. Patient was admitted to observation and monitored overnight with Q6hr H&H, which demonstrated a stable hemoglobin. There were no further hematochezia episodes. Vital signs remained stable. Gastroenterology evaluated in the morning, were of the opinion that patient likely had previously undiagnosed internal hemorrhoid with non-significant new bleeding. Recommended Anusol and sitz bath's, and discharged with outpatient follow-up for labs and clinic follow-up as previously scheduled. Patient was deemed appropriate for discharge home on 10/9/2022 with aforementioned arrangements already in place. He will continue to take the previously diagnosed prednisone taper (per GI service), as well as lactulose for hyperammonemia with hepatic encephalopathy (improved). He will additionally need outpatient follow-up with PCP clinic.   Past Medical History:   Diagnosis Date    Chest pressure 3/8/2016    Cholelithiasis     Chronic alcoholic hepatitis     History of alcohol use     Hypertension 3/8/2016     Past Surgical History:   Procedure Laterality Date    CYST REMOVAL      SC LAP,CHOLECYSTECTOMY/GRAPH N/A 2/19/2018    LAPAROSCOPIC CHOLECYSTECTOMY WITH GRAMS performed by Sonja Chavez MD at [place] [id number]      Deviated Septum    TONSILLECTOMY       Social History     Socioeconomic History    Marital status:      Spouse name: Not on file    Number of children: Not on file    Years of education: Not on file    Highest education level: Not on file   Occupational History    Not on file   Tobacco Use    Smoking status: Every Day     Packs/day: 1.00     Types: Cigarettes    Smokeless tobacco: Never   Vaping Use    Vaping Use: Never used   Substance and Sexual Activity    Alcohol use: Not Currently    Drug use: No    Sexual activity: Not on file   Other Topics Concern    Not on file   Social History Narrative    Not on file     Social Determinants of Health     Financial Resource Strain: Low Risk     Difficulty of Paying Living Expenses: Not hard at all   Food Insecurity: No Food Insecurity    Worried About Running Out of Food in the Last Year: Never true    Ran Out of Food in the Last Year: Never true   Transportation Needs: Not on file   Physical Activity: Not on file   Stress: Not on file   Social Connections: Not on file   Intimate Partner Violence: Not on file   Housing Stability: Not on file     Family History   Problem Relation Age of Onset    Alcohol Abuse Father     Diabetes Brother     Heart Attack Paternal Grandfather     Crohn's Disease Maternal Aunt     Colon Cancer Neg Hx     Celiac Disease Neg Hx      Allergies   Allergen Reactions    Acetaminophen Other (See Comments)     Liver issues  intolerance due to Liver         Review of Systems   Constitutional:  Negative for appetite change, chills, fatigue, fever and unexpected weight change. HENT:  Negative for nosebleeds, tinnitus, trouble swallowing and voice change. Eyes:  Negative for photophobia, pain and redness. Respiratory:  Negative for chest tightness, shortness of breath and wheezing. Cardiovascular:  Negative for chest pain, palpitations and leg swelling. Gastrointestinal:  Negative for abdominal distention, abdominal pain, blood in stool, constipation, diarrhea, nausea, rectal pain and vomiting. Endocrine: Negative for polydipsia, polyphagia and polyuria. Genitourinary:  Negative for difficulty urinating and hematuria. Skin:  Negative for color change, pallor and rash. Neurological:  Negative for dizziness, speech difficulty and headaches. Psychiatric/Behavioral:  Negative for confusion and suicidal ideas.       Objective:   /60 (Site: Left Upper Arm, Position: Sitting, Cuff Size: Small Adult)   Pulse 69   Wt 201 lb (91.2 kg)   SpO2 98%   BMI 27.26 kg/m²     Physical Exam    Laboratory, Pathology, Radiology reviewed in detail with relevantimportant investigations summarized below:  Lab Results   Component Value Date/Time WBC 6.9 10/09/2022 05:33 AM    WBC 7.7 10/08/2022 02:15 PM    WBC 7.3 10/07/2022 10:30 AM    WBC 6.9 10/07/2022 03:15 AM    WBC 8.5 10/06/2022 06:12 AM    HGB 11.4 10/09/2022 10:38 AM    HGB 12.1 10/09/2022 05:33 AM    HGB 12.0 10/08/2022 10:51 PM    HGB 12.7 10/08/2022 02:15 PM    HGB 11.3 10/07/2022 10:30 AM    HCT 34.1 10/09/2022 10:38 AM    HCT 35.6 10/09/2022 05:33 AM    HCT 34.4 10/08/2022 10:51 PM    HCT 36.5 10/08/2022 02:15 PM    HCT 33.0 10/07/2022 10:30 AM    .4 10/09/2022 05:33 AM    .3 10/08/2022 02:15 PM    .7 10/07/2022 10:30 AM    .3 10/07/2022 03:15 AM    .6 10/06/2022 06:12 AM     10/09/2022 05:33 AM     10/08/2022 02:15 PM     10/07/2022 10:30 AM     10/07/2022 03:15 AM     10/06/2022 06:12 AM    .  Lab Results   Component Value Date/Time    ALT 45 10/09/2022 05:33 AM    ALT 45 10/08/2022 02:15 PM    ALT 48 10/07/2022 10:30 AM    AST 77 10/09/2022 05:33 AM    AST 76 10/08/2022 02:15 PM    AST 80 10/07/2022 10:30 AM    ALKPHOS 150 10/09/2022 05:33 AM    ALKPHOS 177 10/08/2022 02:15 PM    ALKPHOS 157 10/07/2022 10:30 AM    BILITOT 11.3 10/09/2022 05:33 AM    BILITOT 11.2 10/08/2022 02:15 PM    BILITOT 13.1 10/07/2022 10:30 AM       CT ABDOMEN PELVIS WO CONTRAST Additional Contrast? None    Result Date: 10/2/2022  EXAMINATION: CT OF THE ABDOMEN AND PELVIS WITHOUT CONTRAST 10/2/2022 1:56 pm TECHNIQUE: CT of the abdomen and pelvis was performed without the administration of intravenous contrast. Multiplanar reformatted images are provided for review. Automated exposure control, iterative reconstruction, and/or weight based adjustment of the mA/kV was utilized to reduce the radiation dose to as low as reasonably achievable. COMPARISON: General [id number].  HISTORY: ORDERING SYSTEM PROVIDED HISTORY: ab pain TECHNOLOGIST PROVIDED HISTORY: Additional Contrast?->None Reason for exam:->ab pain Decision Support Exception - unselect if not a suspected or confirmed emergency medical condition->Emergency Medical Condition (MA) What reading provider will be dictating this exam?->CRC FINDINGS: Lower Chest: Limited evaluation of the lower lung fields demonstrates unremarkable bronchovascular markings with no evidence of focal infiltrate or consolidation. No evidence of acute cardiopulmonary disease is seen. Organs: The liver demonstrates decreased attenuation, no evidence of masses no evidence of intrahepatic biliary dilatation is seen. The gallbladder is surgically absent The common bile duct is unremarkable. The pancreas and spleen demonstrate no evidence of masses. The adrenal glands demonstrate unremarkable contours with no evidence of masses. Mild stranding of the right perirenal planes and along the right pericolic gutter is visualized epicenter appears to be along the ventral aspect of the midpole of the right kidney visualized on coronal series 601, image 51 and axial series 2 image 85, no evidence of a well-defined mass is seen. No evidence of renal stones. No evidence of right hydronephrosis or hydroureter is seen. The left kidney demonstrates no evidence of mass, nervous of stones. GI/Bowel: Small hiatus hernia is seen. The stomach is unremarkable, no evidence of masses. No significant distention of the small and large bowel loops is visualized. The appendix is visualized and is unremarkable. Abundance of stool is visualized in the large bowel. Pelvis: The urinary bladder is not optimally distended. The prostate gland is unremarkable. No evidence of free fluid in the pelvis. No evidence of pelvic mass is seen. Vessels[de-identified] Atherosclerotic calcifications visualized in the abdominal/pelvic vessels. Peritoneum/Retroperitoneum: No evidence of free fluid or air within the peritoneal cavity. Bones/Soft Tissues: No evidence of lytic or sclerotic bone lesion is seen. Abdominal wall soft tissues are unremarkable.  Degenerative changes of the lumbar Questionable focus 1.6 cm somewhat high isoechoic to hypoechoic anteriorly adjacent to the pancreatic head favoring adjacent bowel however underlying soft tissue mass would not be entirely excluded. Lab Results   Component Value Date/Time    IRON 62 10/09/2022 05:33 AM    IRON 109 09/04/2020 01:55 PM    TIBC 111 10/09/2022 05:33 AM    TIBC 253 09/04/2020 01:55 PM    FERRITIN 509.3 09/04/2020 01:55 PM     Lab Results   Component Value Date/Time    INR 2.1 10/09/2022 05:33 AM    INR 1.9 10/08/2022 02:15 PM    INR 2.0 10/07/2022 10:30 AM    INR 2.0 10/07/2022 03:15 AM    INR 2.2 10/06/2022 06:12 AM     No components found for: ACUTEHEPATITISSCREEN  No components found for: CELIACPANEL  No components found for: STOOLCULTURE, C.DIFF, STOOLOVAPARASITE, STOOLLEUCOCYTE        Assessment:       Diagnosis Orders   1. Cirrhosis of liver without ascites, unspecified hepatic cirrhosis type ([facility] [address]  CBC    Comprehensive Metabolic Panel    Protime-INR            Plan:      Orders Placed This Encounter   Procedures    CBC     Standing Status:   Future     Number of Occurrences:   1     Standing Expiration Date:   10/13/2023    Comprehensive Metabolic Panel     Standing Status:   Future     Number of Occurrences:   1     Standing Expiration Date:   10/13/2023    Protime-INR     Standing Status:   Future     Number of Occurrences:   1     Standing Expiration Date:   10/13/2023     Order Specific Question:   Daily Coumadin Dose? Answer:   no     Assessment and plan   1- Alcoholic hepatitis w/Decompensated cirrhosis   - MELD 25 , Maddrey Discriminant Factor 58 at the time of initial assessment  - No active GI bleeding .   -Continue alcohol abstinence-   -Patient has been initiated on prednisone. We will check CMP and INR today and Lille score. If Lille score greater than 0.45, stop treatment (indicates 6 month survival is 25%).  If Lille score less than 0.45, continue for 28 days with 2 week taper  2- Ascites:   None clinically noted at this time  3- Esophageal variceal screening  We will proceed with repeat EGD at further date  4- [facility] [address] screening:   Recent imaging Negative for liver mass  5- Hepatic encephalopathy   Continue lactulose and zinc continue multivitamins. Avoid red meat. 6- Preventive measures  Patient to continue follow-up with primary team regarding age-appropriate screening and assessment. Return in about 4 weeks (around 11/10/2022) for Post procedure results discussion, further management. Wilfrido Greenwood MD    Addendum  Lille score:  0.171 points  Good prognosis.  Scores <0.45 predict a 6-month survival of 85%  Continue prednisone as above  Updated the patient accordingly  HK

## 2022-10-25 ENCOUNTER — TELEPHONE (OUTPATIENT)
Dept: GASTROENTEROLOGY | Age: 52
End: 2022-10-25

## 2022-10-25 NOTE — TELEPHONE ENCOUNTER
Patient is calling for changes in the medication  Prednisone and a liquid to help go to the restroom. (Lactulose) Please advise.

## 2023-03-31 ENCOUNTER — TELEPHONE (OUTPATIENT)
Dept: GASTROENTEROLOGY | Age: 53
End: 2023-03-31

## 2023-03-31 DIAGNOSIS — Z12.11 COLON CANCER SCREENING: Primary | ICD-10-CM

## 2023-03-31 NOTE — TELEPHONE ENCOUNTER
Spoke to patient, agreeable to colonoscopy. Patient scheduled 8/11/2023 at 9:30 am. Miralax Prep mailed to patient. Referral pended to PCP. Info faxed to Merle Mitchell.

## 2023-08-09 ENCOUNTER — PREP FOR PROCEDURE (OUTPATIENT)
Dept: GASTROENTEROLOGY | Age: 53
End: 2023-08-09

## 2023-08-11 RX ORDER — SODIUM CHLORIDE 9 MG/ML
INJECTION, SOLUTION INTRAVENOUS PRN
Status: CANCELLED | OUTPATIENT
Start: 2023-08-11

## 2023-08-11 RX ORDER — SODIUM CHLORIDE 9 MG/ML
INJECTION, SOLUTION INTRAVENOUS CONTINUOUS
Status: CANCELLED | OUTPATIENT
Start: 2023-08-11

## 2023-08-11 RX ORDER — SODIUM CHLORIDE 0.9 % (FLUSH) 0.9 %
5-40 SYRINGE (ML) INJECTION EVERY 12 HOURS SCHEDULED
Status: CANCELLED | OUTPATIENT
Start: 2023-08-11

## 2023-09-12 ENCOUNTER — APPOINTMENT (OUTPATIENT)
Dept: GENERAL RADIOLOGY | Age: 53
End: 2023-09-12
Payer: COMMERCIAL

## 2023-09-12 ENCOUNTER — APPOINTMENT (OUTPATIENT)
Dept: CT IMAGING | Age: 53
End: 2023-09-12
Payer: COMMERCIAL

## 2023-09-12 ENCOUNTER — HOSPITAL ENCOUNTER (EMERGENCY)
Age: 53
Discharge: ANOTHER HOSPITAL | End: 2023-09-12
Payer: COMMERCIAL

## 2023-09-12 VITALS
BODY MASS INDEX: 26.41 KG/M2 | HEART RATE: 100 BPM | WEIGHT: 195 LBS | RESPIRATION RATE: 13 BRPM | TEMPERATURE: 98.1 F | DIASTOLIC BLOOD PRESSURE: 75 MMHG | HEIGHT: 72 IN | SYSTOLIC BLOOD PRESSURE: 123 MMHG | OXYGEN SATURATION: 100 %

## 2023-09-12 DIAGNOSIS — S06.5XAA SUBDURAL HEMATOMA (HCC): Primary | ICD-10-CM

## 2023-09-12 DIAGNOSIS — R90.89 MIDLINE SHIFT OF BRAIN: ICD-10-CM

## 2023-09-12 DIAGNOSIS — R41.89 UNRESPONSIVE: ICD-10-CM
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
## 2023-09-12 LAB
APTT PPP: 41.5 SEC (ref 24.4–36.8)
BASE EXCESS ARTERIAL: 4 (ref -3–3)
BASOPHILS # BLD: 0 K/UL (ref 0–0.2)
BASOPHILS NFR BLD: 0.4 %
CALCIUM IONIZED: 1.1 MMOL/L (ref 1.12–1.32)
CK SERPL-CCNC: 584 U/L (ref 0–190)
EOSINOPHIL # BLD: 0 K/UL (ref 0–0.7)
EOSINOPHIL NFR BLD: 0.6 %
ERYTHROCYTE [DISTWIDTH] IN BLOOD BY AUTOMATED COUNT: 13.7 % (ref 11.5–14.5)
ETHANOL PERCENT: 0.09 G/DL
ETHANOLAMINE SERPL-MCNC: 104 MG/DL (ref 0–0.08)
GLUCOSE BLD-MCNC: 124 MG/DL (ref 70–99)
HCO3 ARTERIAL: 28 MMOL/L (ref 21–29)
HCT VFR BLD AUTO: 31.6 % (ref 42–52)
HCT VFR BLD AUTO: 36 % (ref 41–53)
HGB BLD CALC-MCNC: 12.4 GM/DL (ref 13.5–17.5)
HGB BLD-MCNC: 10.9 G/DL (ref 14–18)
INR PPP: 2.3
LACTATE: 5.65 MMOL/L (ref 0.4–2)
LYMPHOCYTES # BLD: 0.8 K/UL (ref 1–4.8)
LYMPHOCYTES NFR BLD: 19.9 %
MACROCYTES BLD QL SMEAR: ABNORMAL
MCH RBC QN AUTO: 36.8 PG (ref 27–31.3)
MCHC RBC AUTO-ENTMCNC: 34.6 % (ref 33–37)
MCV RBC AUTO: 106.4 FL (ref 79–92.2)
MONOCYTES # BLD: 0.5 K/UL (ref 0.2–0.8)
MONOCYTES NFR BLD: 11.5 %
NEUTROPHILS # BLD: 2.8 K/UL (ref 1.4–6.5)
NEUTS SEG NFR BLD: 67.6 %
O2 SAT, ARTERIAL: 100 % (ref 93–100)
PCO2 ARTERIAL: 39 MM HG (ref 35–45)
PERFORMED ON: ABNORMAL
PH ARTERIAL: 7.46 (ref 7.35–7.45)
PLATELET # BLD AUTO: 39 K/UL (ref 130–400)
PLATELET BLD QL SMEAR: ABNORMAL
PO2 ARTERIAL: 426 MM HG (ref 75–108)
POC CHLORIDE: 105 MEQ/L (ref 99–110)
POC CREATININE: 0.5 MG/DL (ref 0.8–1.3)
POC FIO2: 100
POC SAMPLE TYPE: ABNORMAL
POTASSIUM SERPL-SCNC: 2.5 MEQ/L (ref 3.5–5.1)
PROTHROMBIN TIME: 25.4 SEC (ref 12.3–14.9)
RBC # BLD AUTO: 2.97 M/UL (ref 4.7–6.1)
SLIDE REVIEW: ABNORMAL
SODIUM BLD-SCNC: 144 MEQ/L (ref 136–145)
TCO2 ARTERIAL: 29 MMOL/L (ref 21–32)
WBC # BLD AUTO: 4.1 K/UL (ref 4.8–10.8)

## 2023-09-12 PROCEDURE — 99285 EMERGENCY DEPT VISIT HI MDM: CPT

## 2023-09-12 PROCEDURE — 2580000003 HC RX 258: Performed by: PHYSICIAN ASSISTANT

## 2023-09-12 PROCEDURE — 82565 ASSAY OF CREATININE: CPT

## 2023-09-12 PROCEDURE — 2500000003 HC RX 250 WO HCPCS

## 2023-09-12 PROCEDURE — 71045 X-RAY EXAM CHEST 1 VIEW: CPT

## 2023-09-12 PROCEDURE — 82330 ASSAY OF CALCIUM: CPT

## 2023-09-12 PROCEDURE — 82803 BLOOD GASES ANY COMBINATION: CPT

## 2023-09-12 PROCEDURE — 2700000000 HC OXYGEN THERAPY PER DAY

## 2023-09-12 PROCEDURE — 73560 X-RAY EXAM OF KNEE 1 OR 2: CPT

## 2023-09-12 PROCEDURE — 6360000002 HC RX W HCPCS: Performed by: SURGERY

## 2023-09-12 PROCEDURE — 6360000002 HC RX W HCPCS: Performed by: PHYSICIAN ASSISTANT

## 2023-09-12 PROCEDURE — 82077 ASSAY SPEC XCP UR&BREATH IA: CPT

## 2023-09-12 PROCEDURE — 84132 ASSAY OF SERUM POTASSIUM: CPT

## 2023-09-12 PROCEDURE — 85730 THROMBOPLASTIN TIME PARTIAL: CPT

## 2023-09-12 PROCEDURE — 82435 ASSAY OF BLOOD CHLORIDE: CPT

## 2023-09-12 PROCEDURE — 2500000003 HC RX 250 WO HCPCS: Performed by: PHYSICIAN ASSISTANT

## 2023-09-12 PROCEDURE — 93005 ELECTROCARDIOGRAM TRACING: CPT

## 2023-09-12 PROCEDURE — 82550 ASSAY OF CK (CPK): CPT

## 2023-09-12 PROCEDURE — 85610 PROTHROMBIN TIME: CPT

## 2023-09-12 PROCEDURE — 70450 CT HEAD/BRAIN W/O DYE: CPT

## 2023-09-12 PROCEDURE — 94761 N-INVAS EAR/PLS OXIMETRY MLT: CPT

## 2023-09-12 PROCEDURE — 70486 CT MAXILLOFACIAL W/O DYE: CPT

## 2023-09-12 PROCEDURE — 2580000003 HC RX 258: Performed by: SURGERY

## 2023-09-12 PROCEDURE — 85025 COMPLETE CBC W/AUTO DIFF WBC: CPT

## 2023-09-12 PROCEDURE — 96368 THER/DIAG CONCURRENT INF: CPT

## 2023-09-12 PROCEDURE — 36415 COLL VENOUS BLD VENIPUNCTURE: CPT

## 2023-09-12 PROCEDURE — 83605 ASSAY OF LACTIC ACID: CPT

## 2023-09-12 PROCEDURE — 96365 THER/PROPH/DIAG IV INF INIT: CPT

## 2023-09-12 PROCEDURE — 84295 ASSAY OF SERUM SODIUM: CPT

## 2023-09-12 PROCEDURE — 36600 WITHDRAWAL OF ARTERIAL BLOOD: CPT

## 2023-09-12 PROCEDURE — 72170 X-RAY EXAM OF PELVIS: CPT

## 2023-09-12 PROCEDURE — 72125 CT NECK SPINE W/O DYE: CPT

## 2023-09-12 PROCEDURE — 31500 INSERT EMERGENCY AIRWAY: CPT

## 2023-09-12 PROCEDURE — 85014 HEMATOCRIT: CPT

## 2023-09-12 RX ORDER — SUCCINYLCHOLINE/SOD CL,ISO/PF 100 MG/5ML
SYRINGE (ML) INTRAVENOUS
Status: DISCONTINUED
Start: 2023-09-12 | End: 2023-09-12 | Stop reason: WASHOUT

## 2023-09-12 RX ORDER — 3% SODIUM CHLORIDE 3 G/100ML
1000 INJECTION, SOLUTION INTRAVENOUS ONCE
Status: COMPLETED | OUTPATIENT
Start: 2023-09-12 | End: 2023-09-12

## 2023-09-12 RX ORDER — VECURONIUM BROMIDE 20 MG/20ML
7 INJECTION, POWDER, LYOPHILIZED, FOR SOLUTION INTRAVENOUS ONCE
Status: DISCONTINUED | OUTPATIENT
Start: 2023-09-12 | End: 2023-09-12 | Stop reason: ALTCHOICE

## 2023-09-12 RX ORDER — ONDANSETRON 2 MG/ML
4 INJECTION INTRAMUSCULAR; INTRAVENOUS ONCE
Status: COMPLETED | OUTPATIENT
Start: 2023-09-12 | End: 2023-09-12

## 2023-09-12 RX ORDER — ETOMIDATE 2 MG/ML
20 INJECTION INTRAVENOUS ONCE
Status: COMPLETED | OUTPATIENT
Start: 2023-09-12 | End: 2023-09-12

## 2023-09-12 RX ORDER — ETOMIDATE 2 MG/ML
INJECTION INTRAVENOUS
Status: COMPLETED
Start: 2023-09-12 | End: 2023-09-12

## 2023-09-12 RX ORDER — SODIUM CHLORIDE 9 MG/ML
50 INJECTION, SOLUTION INTRAVENOUS ONCE
Status: COMPLETED | OUTPATIENT
Start: 2023-09-12 | End: 2023-09-12

## 2023-09-12 RX ORDER — VECURONIUM BROMIDE 1 MG/ML
INJECTION, POWDER, LYOPHILIZED, FOR SOLUTION INTRAVENOUS
Status: COMPLETED
Start: 2023-09-12 | End: 2023-09-12

## 2023-09-12 RX ADMIN — PHYTONADIONE 10 MG: 10 INJECTION, EMULSION INTRAMUSCULAR; INTRAVENOUS; SUBCUTANEOUS at 18:03

## 2023-09-12 RX ADMIN — VECURONIUM BROMIDE 7 MG: 1 INJECTION, POWDER, LYOPHILIZED, FOR SOLUTION INTRAVENOUS at 16:03

## 2023-09-12 RX ADMIN — DEXMEDETOMIDINE 0.2 MCG/KG/HR: 100 INJECTION, SOLUTION INTRAVENOUS at 16:37

## 2023-09-12 RX ADMIN — ETOMIDATE 20 MG: 2 INJECTION, SOLUTION INTRAVENOUS at 16:02

## 2023-09-12 RX ADMIN — SODIUM CHLORIDE 50 ML: 9 INJECTION, SOLUTION INTRAVENOUS at 18:06

## 2023-09-12 RX ADMIN — PROTHROMBIN, COAGULATION FACTOR VII HUMAN, COAGULATION FACTOR IX HUMAN, COAGULATION FACTOR X HUMAN, PROTEIN C, PROTEIN S HUMAN, AND WATER 2213 UNITS: KIT at 18:05

## 2023-09-12 RX ADMIN — SODIUM CHLORIDE 1000 ML/HR: 3 INJECTION, SOLUTION INTRAVENOUS at 16:37

## 2023-09-12 RX ADMIN — ETOMIDATE 20 MG: 2 INJECTION INTRAVENOUS at 16:02

## 2023-09-12 RX ADMIN — ONDANSETRON 4 MG: 2 INJECTION INTRAMUSCULAR; INTRAVENOUS at 14:54

## 2023-09-12 ASSESSMENT — ENCOUNTER SYMPTOMS
ABDOMINAL PAIN: 0
SHORTNESS OF BREATH: 0
COLOR CHANGE: 0
RHINORRHEA: 0
SORE THROAT: 0
EYE DISCHARGE: 0
CONSTIPATION: 0
ABDOMINAL DISTENTION: 0

## 2023-09-12 ASSESSMENT — PAIN DESCRIPTION - LOCATION: LOCATION: HEAD

## 2023-09-12 ASSESSMENT — PAIN - FUNCTIONAL ASSESSMENT: PAIN_FUNCTIONAL_ASSESSMENT: 0-10

## 2023-09-12 ASSESSMENT — PAIN SCALES - GENERAL: PAINLEVEL_OUTOF10: 8

## 2023-09-12 ASSESSMENT — PULMONARY FUNCTION TESTS: PIF_VALUE: 38

## 2023-09-12 NOTE — ED NOTES
[address] contacted directly due to patients critical need. This patient is accepted by dr. Zaida Lopez to the trauma ed.    Stacia Garner will be flying the patient     Kit Mckusiember  09/12/23 0935

## 2023-09-12 NOTE — CONSULTS
02:30 PM    RBC 2.97 09/12/2023 02:30 PM    HGB 10.9 09/12/2023 02:30 PM    HCT 31.6 09/12/2023 02:30 PM    PLT 39 09/12/2023 02:30 PM    .4 09/12/2023 02:30 PM    MCH 36.8 09/12/2023 02:30 PM    MCHC 34.6 09/12/2023 02:30 PM    RDW 13.7 09/12/2023 02:30 PM    NRBC 0.0 08/15/2020 05:33 AM    LYMPHOPCT 19.9 09/12/2023 02:30 PM    MONOPCT 11.5 09/12/2023 02:30 PM    BASOPCT 0.4 09/12/2023 02:30 PM    MONOSABS 0.5 09/12/2023 02:30 PM    LYMPHSABS 0.8 09/12/2023 02:30 PM    EOSABS 0.0 09/12/2023 02:30 PM    BASOSABS 0.0 09/12/2023 02:30 PM     CMP:   Lab Results   Component Value Date     10/13/2022    K 4.3 10/13/2022     10/13/2022    CO2 23 10/13/2022    BUN 12 10/13/2022    CREATININE 0.47 (L) 10/13/2022    GLUCOSE 177 (H) 10/13/2022    CALCIUM 8.3 (L) 10/13/2022    PROT 6.3 10/13/2022    LABALBU 2.8 (L) 10/13/2022    BILITOT 8.6 (H) 10/13/2022    ALKPHOS 180 (H) 10/13/2022    AST 82 (H) 10/13/2022    ALT 58 (H) 10/13/2022    LABGLOM >60.0 10/13/2022    GFRAA >60.0 10/13/2022    GLOB 3.5 10/13/2022     Magnesium:   Lab Results   Component Value Date/Time    MG 1.4 10/07/2022 03:15 AM     Troponin:   Lab Results   Component Value Date/Time    TROPONINI <0.010 02/20/2020 01:45 AM     PT/INR:   Recent Labs     09/12/23  1430   PROTIME 25.4*   INR 2.3     APTT:   Recent Labs     09/12/23  1430   APTT 41.5*     EtOH:   Lab Results   Component Value Date/Time    ETOH 104 09/12/2023 02:30 PM       RADIOLOGY: (Personally reviewed)  CXR: pending    CT Head: 1. There is an acute/subacute prominent right subdural hemorrhage causing  significant mass effect and midline shift with subfalcine herniation. The  midline shift is 16 mm. There is significant effacement of the sulci. CT C-Spine: STRAIGHTENING OF THE SPINE IS SEEN AND MAY REFLECT SPASM. DEGENERATIVE CHANGES ARE ALSO SEEN. NO CANAL STENOSIS SEEN AT ANY LEVEL. No acute abnormality of the cervical spine.     CT Face: No acute facial bone trauma. XR L foot: pending    XR R knee: pending    XR L knee: pending    XR Pelvis:  pending    ASSESSMENT:  Minda Schilder is a 46 y.o. male with a PMHx of alcoholic cirrhosis (prior encephalopathy, jaundice coagulopathy), HTN. Patient with ETOH consumption and presumably fell down last 2-3 steps. (+)head strike. (?)LOC. (-)AC/AP medications per patient and chart review. On initial exam, patient with GCS14 (E3, V5, M6), gross motor/sensation intact BUE/BLE. PERLL. Drowsy but easy to wake and remains awake during evaluation. Oriented to self, location, month/year but not to president/situation (thinks EMS transported him from [place]). Ecchymosis/abrasions to face. No obvious signs of trauma to neck/back/chest/abdomen/pelvis. Abrasions to bilateral knees and laceration to left big toe. Patient was taken to CT for imaging. Per nursing/PCA, patient started to \"gurgle and foam at the mouth\" on the way back from CT. ED staff reviewed imaging, which was significant for a right SDH with midline shift. INR 2.3 and PLT 39, suspect in setting of liver disease. Patient with sonorus respirations and patient intubated by ED with etomidate and vecuronium at 16:03pm. Dr. James Guadalupe at bedside. Decision made to transfer to [facility]. Dr. Juan Diego Manning and Dr. Nanci Maldonado with [facility] Trauma accept patient transfer. Hypertonic saline (500mL), kcentra, vit K 10mg and keppra 1gm administered in ED. PLAN:  Transfer to [facility] for NSGY evaluation. Dr. James Guadalupe discussed with Dr. Juan Diego Manning and Dr. Pily Perea to clear c-spine. Imaging is negative. [facility] and disc provided for transfer.         ARGENTINA Dickson  Trauma/Critical Care/General Surgery  [See treatment team sticky note for contact information]     This patient's plan of care was discussed and made in collaboration with Trauma Attending physician, Sallie Rueda MD.

## 2023-09-12 NOTE — ED PROVIDER NOTES
[facility] ED  EMERGENCY DEPARTMENT ENCOUNTER      Pt Name: Spike Dao  MRN: [id number]  [address] [place] 1970  Date of evaluation: 9/12/2023  Provider: Nancie Person PA-C    CHIEF COMPLAINT       Chief Complaint   Patient presents with    Fall     Pt stated he fell down approximately 3-4 steps  AMS  Multiple abrasions  Pt states that he has had several drinks today          HISTORY OF PRESENT ILLNESS   (Location/Symptom, Timing/Onset, Context/Setting, Quality, Duration, Modifying Factors, Severity)  Note limiting factors. Spike Dao is a 46 y.o. male who presents to the emergency department with complaint of head injury, bilateral knee pain, secondary to a fall. Patient states [place], he states he has had 5-6 drinks today. States he was going up the stairs, fell, striking his head. He denies any loss conscious, states he is not on any anticoagulation. Patient complains of frontal and bilateral temporal headache, as well as nausea, no vomiting, no blurred vision, no weakness, no numbness or tingling to arms or legs. Past medical history significant for hypertension, chronic alcoholic hepatitis, alcohol abuse, cholelithiasis. Patient rates his overall pain as a 4 out of 10 at this time. HPI    Nursing Notes were reviewed. REVIEW OF SYSTEMS    (2-9 systems for level 4, 10 or more for level 5)     Review of Systems   Constitutional:  Negative for activity change and appetite change. HENT:  Negative for congestion, ear discharge, ear pain, nosebleeds, rhinorrhea and sore throat. Bitemporal, frontal head pain   Eyes:  Negative for discharge. Respiratory:  Negative for shortness of breath. Cardiovascular:  Negative for chest pain, palpitations and leg swelling. Gastrointestinal:  Negative for abdominal distention, abdominal pain and constipation. Genitourinary:  Negative for difficulty urinating and dysuria. Musculoskeletal:  Negative for arthralgias.

## 2023-09-12 NOTE — ED NOTES
Late Entry from [phone]  Patient taken from Xray to CT, during this time he was unresponsive, awake and vitals were stable according to the monitor. See Flowsheet.   Arrival to the ED from CT 1556 There was a change in breathing and patient was gurgling, suction was used and patient was put on a NRB at 15L   Dr. Sharon Morales PA, [address] myself, Pharmacy  1659 Nasal Trumpet into the R nares  1602 20mg Atomodate given IV  1603 7 of Vec given IV vitals 170/98, P 103 R 18, SPO2 98  1608 Intubation completed by Dr. Nadira Gordon 24 @ the teeth  Respiratory at the bedside, vent applied 1608  1625 OG completed with + sounds and suction return, 72 @ the teeth  1645 Xray at the bedside for placement  [facility] arrived Report given to Bambi Wilkins RN and [id number]  09/12/23 [id number]

## 2023-09-16 LAB
EKG ATRIAL RATE: 107 BPM
EKG P AXIS: 66 DEGREES
EKG P-R INTERVAL: 160 MS
EKG Q-T INTERVAL: 386 MS
EKG QRS DURATION: 88 MS
EKG QTC CALCULATION (BAZETT): 515 MS
EKG R AXIS: 62 DEGREES
EKG T AXIS: 66 DEGREES
EKG VENTRICULAR RATE: 107 BPM

## (undated) DEVICE — APPLIER CLP L SHFT DIA12MM 20 ROT MULT LIGACLP

## (undated) DEVICE — DRAPE SURG C-ARM MOBILE XRAY LF

## (undated) DEVICE — ELECTRODE PT RET AD L9FT HI MOIST COND ADH HYDRGEL CORDED

## (undated) DEVICE — INTENDED FOR TISSUE SEPARATION, AND OTHER PROCEDURES THAT REQUIRE A SHARP SURGICAL BLADE TO PUNCTURE OR CUT.: Brand: BARD-PARKER ® CARBON RIB-BACK BLADES

## (undated) DEVICE — PACK,SET UP,DRAPE: Brand: MEDLINE

## (undated) DEVICE — Device

## (undated) DEVICE — TROCAR: Brand: KII SHIELDED BLADED ACCESS SYSTEM

## (undated) DEVICE — COVER LT HNDL BLU PLAS

## (undated) DEVICE — SMARTGOWN BREATHABLE SPECIALTY GOWN: Brand: CONVERTORS

## (undated) DEVICE — KIT CHOLGM POLYUR W/ KARLAN BLLN CATH 4FR L60CM 5MM INTRO

## (undated) DEVICE — BAG SPEC LAP H6IN DIA3IN 250ML 10 12MM CANN ATTCH MEM WIRE

## (undated) DEVICE — GOWN,AURORA,NONREINFORCED,LARGE: Brand: MEDLINE

## (undated) DEVICE — WARMER LAPSCP BST 2 STG STRL DISP HEAT BLU

## (undated) DEVICE — DRAIN JACKSON PRATT ROUND 15FR: Brand: CARDINAL HEALTH

## (undated) DEVICE — DEFOGGER!" ANTI FOG KIT: Brand: DEROYAL

## (undated) DEVICE — FLEXIBLE ADHESIVE BANDAGE,X-LARGE: Brand: CURITY

## (undated) DEVICE — SUTURE PROL SZ 3-0 L30IN NONABSORBABLE BLU L30MM FS-1 3/8 8675H

## (undated) DEVICE — CHLORAPREP 26ML ORANGE

## (undated) DEVICE — NEEDLE INSUF L120MM ULT VERES ENDOPATH

## (undated) DEVICE — JACKSON-PRATT 100CC BULB RESERVOIR: Brand: CARDINAL HEALTH

## (undated) DEVICE — SUTURE VCRL + SZ 3-0 L27IN ABSRB UD L26MM SH 1/2 CIR VCP416H

## (undated) DEVICE — Z DISCONTINUED NO SUB IDED GLOVE SURG BEAD CUF 8 STD PF WHT STRL TRIUMPH LT LTX

## (undated) DEVICE — LABEL MED MINI W/ MARKER

## (undated) DEVICE — TROCAR: Brand: KII® SLEEVE

## (undated) DEVICE — COVER,MAYO STAND,STERILE: Brand: MEDLINE

## (undated) DEVICE — SYRINGE MED 10ML TRNSLUC BRL PLUNG BLK MRK POLYPR CTRL

## (undated) DEVICE — SPONGE,LAP,18"X18",DLX,XR,ST,5/PK,40/PK: Brand: MEDLINE

## (undated) DEVICE — INSUFFLATION TUBING SET, WITH FILTER: Brand: CONMED

## (undated) DEVICE — DRAPE,LAP,CHOLE,W/TROUGHS,STERILE: Brand: MEDLINE

## (undated) DEVICE — 1842 FOAM BLOCK NEEDLE COUNTER: Brand: DEVON

## (undated) DEVICE — TROCAR: Brand: KII FIOS FIRST ENTRY

## (undated) DEVICE — SUTURE MCRYL SZ 4-0 L27IN ABSRB UD L19MM PS-2 1/2 CIR PRIM Y426H